# Patient Record
Sex: FEMALE | Race: WHITE | NOT HISPANIC OR LATINO | Employment: UNEMPLOYED | URBAN - METROPOLITAN AREA
[De-identification: names, ages, dates, MRNs, and addresses within clinical notes are randomized per-mention and may not be internally consistent; named-entity substitution may affect disease eponyms.]

---

## 2017-04-28 ENCOUNTER — HOSPITAL ENCOUNTER (INPATIENT)
Facility: HOSPITAL | Age: 72
LOS: 1 days | Discharge: NURSING, REHAB OR LONG-TERM CARE | DRG: 065 | End: 2017-05-02
Attending: HOSPITALIST | Admitting: HOSPITALIST
Payer: MEDICARE

## 2017-04-28 DIAGNOSIS — I63.81 LACUNAR STROKE OF LEFT SUBTHALAMIC REGION: ICD-10-CM

## 2017-04-28 DIAGNOSIS — I63.9 STROKE: ICD-10-CM

## 2017-04-28 DIAGNOSIS — R29.898 LEG WEAKNESS: ICD-10-CM

## 2017-04-28 PROBLEM — I10 ESSENTIAL HYPERTENSION, BENIGN: Status: ACTIVE | Noted: 2017-04-28

## 2017-04-28 PROCEDURE — G0379 DIRECT REFER HOSPITAL OBSERV: HCPCS

## 2017-04-28 PROCEDURE — 25000003 PHARM REV CODE 250: Performed by: HOSPITALIST

## 2017-04-28 PROCEDURE — 63600175 PHARM REV CODE 636 W HCPCS: Performed by: HOSPITALIST

## 2017-04-28 PROCEDURE — G0378 HOSPITAL OBSERVATION PER HR: HCPCS

## 2017-04-28 PROCEDURE — 11000001 HC ACUTE MED/SURG PRIVATE ROOM

## 2017-04-28 RX ORDER — ASPIRIN 81 MG/1
81 TABLET ORAL DAILY
Status: DISCONTINUED | OUTPATIENT
Start: 2017-04-29 | End: 2017-05-02 | Stop reason: HOSPADM

## 2017-04-28 RX ORDER — ENOXAPARIN SODIUM 100 MG/ML
40 INJECTION SUBCUTANEOUS EVERY 24 HOURS
Status: DISCONTINUED | OUTPATIENT
Start: 2017-04-28 | End: 2017-05-02 | Stop reason: HOSPADM

## 2017-04-28 RX ORDER — ACETAMINOPHEN 325 MG/1
650 TABLET ORAL EVERY 6 HOURS PRN
Status: DISCONTINUED | OUTPATIENT
Start: 2017-04-28 | End: 2017-05-02 | Stop reason: HOSPADM

## 2017-04-28 RX ORDER — ATORVASTATIN CALCIUM 40 MG/1
40 TABLET, FILM COATED ORAL DAILY
Status: DISCONTINUED | OUTPATIENT
Start: 2017-04-29 | End: 2017-05-02 | Stop reason: HOSPADM

## 2017-04-28 RX ORDER — ONDANSETRON 8 MG/1
8 TABLET, ORALLY DISINTEGRATING ORAL EVERY 8 HOURS PRN
Status: DISCONTINUED | OUTPATIENT
Start: 2017-04-28 | End: 2017-05-02 | Stop reason: HOSPADM

## 2017-04-28 RX ORDER — SODIUM CHLORIDE 9 MG/ML
INJECTION, SOLUTION INTRAVENOUS CONTINUOUS
Status: DISCONTINUED | OUTPATIENT
Start: 2017-04-28 | End: 2017-04-29

## 2017-04-28 RX ORDER — SODIUM CHLORIDE 0.9 % (FLUSH) 0.9 %
3 SYRINGE (ML) INJECTION EVERY 8 HOURS
Status: DISCONTINUED | OUTPATIENT
Start: 2017-04-28 | End: 2017-05-02 | Stop reason: HOSPADM

## 2017-04-28 RX ADMIN — ENOXAPARIN SODIUM 40 MG: 100 INJECTION SUBCUTANEOUS at 09:04

## 2017-04-28 RX ADMIN — SODIUM CHLORIDE: 0.9 INJECTION, SOLUTION INTRAVENOUS at 09:04

## 2017-04-28 RX ADMIN — SODIUM CHLORIDE, PRESERVATIVE FREE 3 ML: 5 INJECTION INTRAVENOUS at 10:04

## 2017-04-29 PROBLEM — G45.9 TIA (TRANSIENT ISCHEMIC ATTACK): Status: ACTIVE | Noted: 2017-04-29

## 2017-04-29 PROBLEM — I63.81: Status: ACTIVE | Noted: 2017-04-29

## 2017-04-29 LAB
ANION GAP SERPL CALC-SCNC: 10 MMOL/L
BACTERIA #/AREA URNS HPF: NORMAL /HPF
BASOPHILS # BLD AUTO: 0.02 K/UL
BASOPHILS NFR BLD: 0.4 %
BILIRUB UR QL STRIP: NEGATIVE
BUN SERPL-MCNC: 12 MG/DL
CALCIUM SERPL-MCNC: 9.1 MG/DL
CHLORIDE SERPL-SCNC: 109 MMOL/L
CHOLEST/HDLC SERPL: 3.2 {RATIO}
CLARITY UR: CLEAR
CO2 SERPL-SCNC: 21 MMOL/L
COLOR UR: YELLOW
CREAT SERPL-MCNC: 0.8 MG/DL
DIFFERENTIAL METHOD: NORMAL
EOSINOPHIL # BLD AUTO: 0.1 K/UL
EOSINOPHIL NFR BLD: 2.5 %
ERYTHROCYTE [DISTWIDTH] IN BLOOD BY AUTOMATED COUNT: 13.4 %
EST. GFR  (AFRICAN AMERICAN): >60 ML/MIN/1.73 M^2
EST. GFR  (NON AFRICAN AMERICAN): >60 ML/MIN/1.73 M^2
ESTIMATED AVG GLUCOSE: 114 MG/DL
GLUCOSE SERPL-MCNC: 79 MG/DL
GLUCOSE UR QL STRIP: NEGATIVE
HBA1C MFR BLD HPLC: 5.6 %
HCT VFR BLD AUTO: 38.4 %
HDL/CHOLESTEROL RATIO: 30.9 %
HDLC SERPL-MCNC: 282 MG/DL
HDLC SERPL-MCNC: 87 MG/DL
HGB BLD-MCNC: 12.9 G/DL
HGB UR QL STRIP: ABNORMAL
KETONES UR QL STRIP: NEGATIVE
LDLC SERPL CALC-MCNC: 173.2 MG/DL
LEUKOCYTE ESTERASE UR QL STRIP: ABNORMAL
LYMPHOCYTES # BLD AUTO: 2.3 K/UL
LYMPHOCYTES NFR BLD: 44.7 %
MCH RBC QN AUTO: 30.9 PG
MCHC RBC AUTO-ENTMCNC: 33.6 %
MCV RBC AUTO: 92 FL
MICROSCOPIC COMMENT: NORMAL
MONOCYTES # BLD AUTO: 0.4 K/UL
MONOCYTES NFR BLD: 6.9 %
NEUTROPHILS # BLD AUTO: 2.4 K/UL
NEUTROPHILS NFR BLD: 45.3 %
NITRITE UR QL STRIP: NEGATIVE
NONHDLC SERPL-MCNC: 195 MG/DL
PH UR STRIP: 6 [PH] (ref 5–8)
PLATELET # BLD AUTO: 189 K/UL
PMV BLD AUTO: 10.6 FL
POTASSIUM SERPL-SCNC: 3.6 MMOL/L
PROT UR QL STRIP: NEGATIVE
RBC # BLD AUTO: 4.17 M/UL
RBC #/AREA URNS HPF: 1 /HPF (ref 0–4)
SODIUM SERPL-SCNC: 140 MMOL/L
SP GR UR STRIP: 1.01 (ref 1–1.03)
SQUAMOUS #/AREA URNS HPF: 2 /HPF
TRIGL SERPL-MCNC: 109 MG/DL
TSH SERPL DL<=0.005 MIU/L-ACNC: 1.52 UIU/ML
URN SPEC COLLECT METH UR: ABNORMAL
UROBILINOGEN UR STRIP-ACNC: NEGATIVE EU/DL
WBC # BLD AUTO: 5.21 K/UL
WBC #/AREA URNS HPF: 3 /HPF (ref 0–5)

## 2017-04-29 PROCEDURE — 93306 TTE W/DOPPLER COMPLETE: CPT | Mod: 26,,, | Performed by: INTERNAL MEDICINE

## 2017-04-29 PROCEDURE — 85025 COMPLETE CBC W/AUTO DIFF WBC: CPT

## 2017-04-29 PROCEDURE — 94761 N-INVAS EAR/PLS OXIMETRY MLT: CPT

## 2017-04-29 PROCEDURE — 97802 MEDICAL NUTRITION INDIV IN: CPT

## 2017-04-29 PROCEDURE — G8996 SWALLOW CURRENT STATUS: HCPCS | Mod: CJ

## 2017-04-29 PROCEDURE — G8997 SWALLOW GOAL STATUS: HCPCS | Mod: CH

## 2017-04-29 PROCEDURE — 80061 LIPID PANEL: CPT

## 2017-04-29 PROCEDURE — 80048 BASIC METABOLIC PNL TOTAL CA: CPT

## 2017-04-29 PROCEDURE — 93306 TTE W/DOPPLER COMPLETE: CPT

## 2017-04-29 PROCEDURE — G8988 SELF CARE GOAL STATUS: HCPCS | Mod: CJ

## 2017-04-29 PROCEDURE — 84443 ASSAY THYROID STIM HORMONE: CPT

## 2017-04-29 PROCEDURE — 97166 OT EVAL MOD COMPLEX 45 MIN: CPT

## 2017-04-29 PROCEDURE — 97530 THERAPEUTIC ACTIVITIES: CPT

## 2017-04-29 PROCEDURE — G8979 MOBILITY GOAL STATUS: HCPCS | Mod: CI

## 2017-04-29 PROCEDURE — 25500020 PHARM REV CODE 255: Performed by: HOSPITALIST

## 2017-04-29 PROCEDURE — G8987 SELF CARE CURRENT STATUS: HCPCS | Mod: CK

## 2017-04-29 PROCEDURE — 83036 HEMOGLOBIN GLYCOSYLATED A1C: CPT

## 2017-04-29 PROCEDURE — G8978 MOBILITY CURRENT STATUS: HCPCS | Mod: CL

## 2017-04-29 PROCEDURE — 97162 PT EVAL MOD COMPLEX 30 MIN: CPT

## 2017-04-29 PROCEDURE — 36415 COLL VENOUS BLD VENIPUNCTURE: CPT

## 2017-04-29 PROCEDURE — 81000 URINALYSIS NONAUTO W/SCOPE: CPT

## 2017-04-29 PROCEDURE — 63600175 PHARM REV CODE 636 W HCPCS: Performed by: HOSPITALIST

## 2017-04-29 PROCEDURE — 97116 GAIT TRAINING THERAPY: CPT

## 2017-04-29 PROCEDURE — 11000001 HC ACUTE MED/SURG PRIVATE ROOM

## 2017-04-29 PROCEDURE — A9585 GADOBUTROL INJECTION: HCPCS | Performed by: HOSPITALIST

## 2017-04-29 PROCEDURE — G0378 HOSPITAL OBSERVATION PER HR: HCPCS

## 2017-04-29 PROCEDURE — 92610 EVALUATE SWALLOWING FUNCTION: CPT

## 2017-04-29 PROCEDURE — 25000003 PHARM REV CODE 250: Performed by: HOSPITALIST

## 2017-04-29 RX ORDER — AMLODIPINE BESYLATE 5 MG/1
5 TABLET ORAL DAILY
Status: DISCONTINUED | OUTPATIENT
Start: 2017-04-30 | End: 2017-04-29

## 2017-04-29 RX ORDER — AMLODIPINE BESYLATE 5 MG/1
5 TABLET ORAL DAILY
Status: DISCONTINUED | OUTPATIENT
Start: 2017-04-29 | End: 2017-05-02 | Stop reason: HOSPADM

## 2017-04-29 RX ORDER — GADOBUTROL 604.72 MG/ML
10 INJECTION INTRAVENOUS
Status: COMPLETED | OUTPATIENT
Start: 2017-04-29 | End: 2017-04-29

## 2017-04-29 RX ADMIN — SODIUM CHLORIDE, PRESERVATIVE FREE 3 ML: 5 INJECTION INTRAVENOUS at 01:04

## 2017-04-29 RX ADMIN — GADOBUTROL 10 ML: 604.72 INJECTION INTRAVENOUS at 12:04

## 2017-04-29 RX ADMIN — SODIUM CHLORIDE, PRESERVATIVE FREE 3 ML: 5 INJECTION INTRAVENOUS at 06:04

## 2017-04-29 RX ADMIN — ENOXAPARIN SODIUM 40 MG: 100 INJECTION SUBCUTANEOUS at 04:04

## 2017-04-29 RX ADMIN — SODIUM CHLORIDE, PRESERVATIVE FREE 3 ML: 5 INJECTION INTRAVENOUS at 10:04

## 2017-04-29 RX ADMIN — AMLODIPINE BESYLATE 5 MG: 5 TABLET ORAL at 04:04

## 2017-04-29 NOTE — PLAN OF CARE
Problem: Patient Care Overview  Goal: Plan of Care Review  Outcome: Ongoing (interventions implemented as appropriate)  Pt. In bed , call light within reach, fall reduction plan in place,TELE with no true red alarms during night.Upon initial assessment/frequent checks, pt. Had no complaints of pain/distress during night.NEURO/NIHS scale reveal no new deficits , R Leg still with drift /w patient still complaint of R leg heaviness.Will continue to monitor and report to oncoming nurse.

## 2017-04-29 NOTE — PLAN OF CARE
Problem: Occupational Therapy Goal  Goal: Occupational Therapy Goal  Goals to be met by: 5/29/2017     Patient will increase functional independence with ADLs by performing:    UE Dressing with Modified [place].  LE Dressing with Modified [place].  Grooming while standing at sink with Modified [place].  Toileting from bedside commode with Modified [place] for hygiene and clothing management.   Bathing from shower chair/bench with Supervision.  Stand pivot transfers with Modified [place].  Toilet transfer to bedside commode with Modified [place].    Pt. Has SP cane not in use. Needs TTB & BSC. To benefit from OT in In-patient rehab.   Outcome: Ongoing (interventions implemented as appropriate)  Initial OT eval complete.  Pt. Performed supine <-> sit with assist for RLE.  Ambulated with RW & R-knee block for household distance.  Stand-pivot toilet T/F with RW, assist for lift/lower, & min v/c for safe hand placement.  Required assist for initial clothing management & CGA for clothing management after toileting 2* impaired balance.  To benefit from continued OT services to increase independence in self-care.  [place] for supine <-> sit.  ModA for toilet T/F.  OT to follow.

## 2017-04-29 NOTE — PLAN OF CARE
Problem: Physical Therapy Goal  Goal: Physical Therapy Goal  Goals to be met by: 17     Patient will increase functional independence with mobility by performin. Supine to sit with Stand-by Assistance  2. Sit to supine with Stand-by Assistance  3. Sit to stand transfer with Contact Guard Assistance  4. Bed to chair transfer with Contact Guard Assistance using Rolling Walker  5. Gait x 50 feet with Contact Guard Assistance using Rolling Walker.   6. Ascend/descend 3 stair with bilateral Handrails Minimal Assistance   7. Lower extremity exercise program x20 reps with assistance as needed  Rec: In pt rehab DME: RW, W/C, BSC TTB  Outcome: Ongoing (interventions implemented as appropriate)  PT evaluation was completed. Pt requires EZRA x 1 for supine<>sit, sit<>stand and commode transfer using a rw with MODA x 1, gait up to 10 ft requiring MODA x 1 needing assist/faciliatation for correct R LE advancement and knee ext during stance phase of gait.

## 2017-04-29 NOTE — PLAN OF CARE
04/29/17 1314   Discharge Assessment   Assessment Type Discharge Planning Assessment   Confirmed/corrected address and phone number on facesheet? Yes   Assessment information obtained from? Patient   Prior to hospitilization cognitive status: Alert/Oriented   Prior to hospitalization functional status: Independent   Current cognitive status: Alert/Oriented   Current Functional Status: Independent   Arrived From [facility] hospital  ([facility])   Lives With spouse   Able to Return to Prior Arrangements yes   Is patient able to care for self after discharge? Yes   How many people do you have in your home that can help with your care after discharge? 1   Who are your caregiver(s) and their phone number(s)? Mitch Kincaid Jr. ()[phone]   Patient's perception of discharge disposition home or selfcare   Readmission Within The Last 30 Days no previous admission in last 30 days   Patient currently being followed by outpatient case management? No   Patient currently receives home health services? No   Does the patient currently use HME? No   Patient currently receives private duty nursing? No   Patient currently receives any other outside agency services? No   Equipment Currently Used at Home none   Do you have any problems affording any of your prescribed medications? No   Is the patient taking medications as prescribed? yes   Do you have any financial concerns preventing you from receiving the healthcare you need? No   Does the patient have transportation to healthcare appointments? Yes   Transportation Available car;family or friend will provide   On Dialysis? No   Does the patient receive services at the [facility]? No   Are there any open cases? No   Discharge Plan A Home with family   Discharge Plan B Home Health   Patient/Family In Agreement With Plan yes

## 2017-04-29 NOTE — NURSING
Pt has no tingling or numbness in right leg but when ambulated to restroom her leg does not have any strength or ability to move without assistance.

## 2017-04-29 NOTE — SUBJECTIVE & OBJECTIVE
Past Medical History:   Diagnosis Date    Arthritis     Hypertension        Past Surgical History:   Procedure Laterality Date    APPENDECTOMY      BREAST SURGERY      Bilateral mastectomy    HYSTERECTOMY         Review of patient's allergies indicates:  No Known Allergies    Current Facility-Administered Medications on File Prior to Encounter   Medication    [COMPLETED] aspirin tablet 325 mg     Current Outpatient Prescriptions on File Prior to Encounter   Medication Sig    amlodipine (NORVASC) 2.5 MG tablet Take 5 mg by mouth once daily.     ibuprofen (ADVIL,MOTRIN) 200 MG tablet Take 200 mg by mouth every 6 (six) hours as needed for Pain.     Family History     Problem Relation (Age of Onset)    Diabetes Maternal Grandmother    Heart disease Father    Hypertension Mother        Social History Main Topics    Smoking status: Current Every Day Smoker     Packs/day: 0.50     Years: 15.00    Smokeless tobacco: Not on file    Alcohol use No    Drug use: No    Sexual activity: Not on file     Review of Systems   Constitutional: Negative for chills, diaphoresis and fever.   HENT: Negative for sore throat and trouble swallowing.    Eyes: Negative for photophobia and visual disturbance.   Respiratory: Negative for cough, shortness of breath and wheezing.    Cardiovascular: Negative for chest pain and palpitations.   Gastrointestinal: Negative for abdominal pain, constipation, diarrhea, nausea and vomiting.   Endocrine: Negative for polydipsia and polyphagia.   Genitourinary: Negative for decreased urine volume, dysuria, hematuria and urgency.   Musculoskeletal: Negative for joint swelling, neck pain and neck stiffness.   Neurological: Positive for tremors (right side) and numbness (right side). Negative for weakness and headaches.   Psychiatric/Behavioral: Negative for agitation, dysphoric mood and suicidal ideas.     Objective:     Vital Signs (Most Recent):  Temp: 96.6 °F (35.9 °C) (04/29/17 0840)  Pulse:  (!) 51 (04/29/17 0840)  Resp: 18 (04/29/17 0840)  BP: (!) 144/71 (04/29/17 0840)  SpO2: 97 % (04/29/17 0922) Vital Signs (24h Range):  Temp:  [96.6 °F (35.9 °C)-98.3 °F (36.8 °C)] 96.6 °F (35.9 °C)  Pulse:  [51-84] 51  Resp:  [17-20] 18  SpO2:  [97 %-99 %] 97 %  BP: (118-173)/(71-99) 144/71     Weight: 52.9 kg (116 lb 10 oz)  Body mass index is 21.33 kg/(m^2).    Physical Exam   Constitutional: She appears well-developed and well-nourished. No distress.   HENT:   Head: Normocephalic and atraumatic.   Mouth/Throat: Oropharynx is clear and moist. No oropharyngeal exudate.   Eyes: Conjunctivae are normal. Pupils are equal, round, and reactive to light. No scleral icterus.   Neck: Neck supple.   Cardiovascular: Normal rate, regular rhythm, normal heart sounds and intact distal pulses.  Exam reveals no gallop and no friction rub.    No murmur heard.  Pulmonary/Chest: Effort normal and breath sounds normal. No respiratory distress. She has no wheezes. She has no rales.   Abdominal: Soft. Bowel sounds are normal. She exhibits no distension. There is no tenderness. There is no rebound and no guarding.   Musculoskeletal: She exhibits no edema, tenderness or deformity.   Neurological: She is alert. She is disoriented ([place] to person and place. Not Time). She exhibits normal muscle tone. GCS eye subscore is 3. GCS verbal subscore is 5. GCS motor subscore is 6.   Mild Left Facial Droop  Mild Right Arm and Right Leg Weakness   Skin: Skin is warm and dry. No rash noted. She is not diaphoretic.   Psychiatric: She has a normal mood and affect. Her behavior is normal.   Oriented to person and place.Could not remember year or why she came to the hospital.    Nursing note and vitals reviewed.       Significant Labs:   CBC:   Recent Labs  Lab 04/28/17  1721 04/29/17  0521   WBC 7.77 5.21   HGB 13.9 12.9   HCT 42.1 38.4    189     CMP:   Recent Labs  Lab 04/28/17  1721 04/29/17  0521    140   K 3.6 3.6    109   CO2  24 21*   GLU 95 79   BUN 14 12   CREATININE 0.73 0.8   CALCIUM 9.9 9.1   PROT 8.1  --    ALBUMIN 4.7  --    BILITOT 0.7  --    ALKPHOS 84  --    AST 27  --    ALT 26  --    ANIONGAP 10 10   EGFRNONAA >60.0 >60     Coagulation:   Recent Labs  Lab 04/28/17  1721   INR 1.1   APTT 26.7     Lipid Panel:   Recent Labs  Lab 04/29/17  0521   CHOL 282*   HDL 87*   LDLCALC 173.2*   TRIG 109   CHOLHDL 30.9     Troponin:   Recent Labs  Lab 04/28/17  1721   TROPONINI <0.012     TSH:   Recent Labs  Lab 04/29/17  0521   TSH 1.521     Urine Studies:   Recent Labs  Lab 04/29/17  0818   COLORU Yellow   APPEARANCEUA Clear   PHUR 6.0   SPECGRAV 1.010   PROTEINUA Negative   GLUCUA Negative   KETONESU Negative   BILIRUBINUA Negative   OCCULTUA 1+*   NITRITE Negative   UROBILINOGEN Negative   LEUKOCYTESUR 1+*   RBCUA 1   WBCUA 3   BACTERIA Rare   SQUAMEPITHEL 2       Significant Imaging:   Imaging Results     None

## 2017-04-29 NOTE — PLAN OF CARE
Problem: SLP Goal  Goal: SLP Goal  Short Term Goals:  1. Pt will consume % of mechanical soft diet with no s/s of airway penetration and/or aspiration given min assist.  2. Pt will implement safe swallowing strategies 100% of the time during meals given no assist.   3. Pt will answer safety questions with 100% accuracy given min assist.   Outcome: Ongoing (interventions implemented as appropriate)  Completed bedside swallow evaluation with SLP. S/s of airway penetration and/or aspiration characterized by coughing noted with straw sips of water. Eliminated with small cup sips. Slow mastication of solids. Recommend a mechanical soft diet with thin liquids and no straws. Assist with meal set up. Crush pills in puree.   Evan Head MS, CCC-SLP

## 2017-04-29 NOTE — PLAN OF CARE
Problem: Patient Care Overview  Goal: Plan of Care Review  Outcome: Ongoing (interventions implemented as appropriate)  Pt has reported RLE weakness/heaviness and drift. Pt is able to turn herself in bed and ambulate with assistance to the restroom. Bed alarm set. SCD's and EVAN's applied. No reported pain and No noted distress.

## 2017-04-29 NOTE — PT/OT/SLP EVAL
Physical Therapy  Evaluation and  Treatment    Antonina Butler   MRN: [id number]   Admitting Diagnosis: Lacunar stroke of left subthalamic region    PT Received On: 17  PT Start Time: 1106 (also seen 13:57 due to MRI interrupting earlier visit)     PT Stop Time: 1121 (2nd visit ended 14:25)    PT Total Time (min): 43 min       Billable Minutes:  Evaluation 15  Gait training 10 minutes    Diagnosis: Lacunar stroke of left subthalamic region      Past Medical History:   Diagnosis Date    Arthritis     Hypertension       Past Surgical History:   Procedure Laterality Date    APPENDECTOMY      BREAST SURGERY      Bilateral mastectomy    HYSTERECTOMY         Referring physician: Dr. Cook  Date referred to PT: 17    General Precautions: Standard, aspiration, fall  Orthopedic Precautions: N/A   Braces: N/A       Do you have any cultural, spiritual, Yazdanism conflicts, given your current situation?: None    Patient History:  Lives With: spouse  Living Arrangements: house  Home Accessibility: stairs to enter home  Number of Stairs to Enter Home: 3 (one step  to deck with railing and 3 steps from deck into house without  railing.)  Stair Railings at Home: none (railing present on R for one step to deck only.)  Living Environment Comment: Pt lives with spouse in ss house with one step and railing on R to deck and 3 steps without railing from deck to house. Pt was (I) with ADLS including cooking, cleaning, driving and gait at community level without use of DME. Pt has a SPC but wasn't using device.  Equipment Currently Used at Home: none  DME owned (not currently used): single point cane    Previous Level of Function:  Ambulation Skills: independent  Transfer Skills: independent  ADL Skills: independent  Work/Leisure Activity: independent    Subjective:  Communicated with RN prior to session.    Chief Complaint: R leg weakness  Patient goals: to return to PLOF    Pain Ratin/10               Pain Rating  Post-Intervention: 0/10    Objective:   Patient found with: peripheral IV, SCD, telemetry, blood pressure cuff     Cognitive Exam:  Oriented to: Person, place, time and situation    Follows Commands/attention: Follows multi-step commands but with slowed motor and cognitive responses  Communication: clear/fluent  Safety awareness/insight to disability: impaired    Physical Exam:  Postural examination/scapula alignment: No postural abnormalities identified    Skin integrity: Visible skin intact  Edema: None noted     Sensation:   Intact    Lower Extremity Range of Motion:  Right Lower Extremity: limited hip/knee/ankle AROM  Left Lower Extremity: WFL    Lower Extremity Strength:  Right Lower Extremity: 2/5 to 2+/5  Left Lower Extremity: 4/5     Fine motor coordination:  Impaired R heel to shin test    Gross motor coordination: impaired R LE    Functional Mobility:  Bed Mobility:  Scooting/Bridging: Minimum Assistance (MIN A for R LE)  Supine to Sit: Minimum Assistance (assist for R LE)  Sit to Supine: Minimum Assistance (assist for R LE)    Transfers:  Sit <> Stand Assistance: Moderate Assistance  Sit <> Stand Assistive Device: Rolling Walker  Toilet Transfer Technique: Stand Pivot  Toilet Transfer Assistance: Moderate Assistance  Toilet Transfer Assistive Device: Rolling Walker    Gait:   Gait Distance: 10 ft  Assistance 1: Moderate assistance  Gait Assistive Device: Rolling walker  Gait Pattern: 3-point gait  Gait Deviation(s): decreased duran, decreased step length, decreased stride length, decreased swing-to-stance ratio, decreased toe-to-floor clearance, decreased weight-shifting ability, excessive knee flexion, knee hyperextension (assist needed for advancement of R LE and knee ext during  stance phase of gait.)    Stairs:  Not tested    Balance:   Static Sit: GOOD: Takes MODERATE challenges from all directions  Dynamic Sit: GOOD: Maintains balance through MODERATE excursions of active trunk movement  Static  Stand: POOR+: Needs MINIMAL assist to maintain  Dynamic stand: POOR: N/A    Therapeutic Activities and Exercises:  Supine<>sit with EZRA x 1 for R LE, sit<>stand and commode transfer using a rw with MODA x 1  Ambulated 10 ft x 2 using a rw with MODA x 1 and assist/facilitation for correct R LE advancement and knee ext during stance phase of gait    AM-PAC 6 CLICK MOBILITY  How much help from another person does this patient currently need?   1 = Unable, Total/Dependent Assistance  2 = A lot, Maximum/Moderate Assistance  3 = A little, Minimum/Contact Guard/Supervision  4 = None, Modified [place]/Independent    Turning over in bed (including adjusting bedclothes, sheets and blankets)?: 3  Sitting down on and standing up from a chair with arms (e.g., wheelchair, bedside commode, etc.): 2  Moving from lying on back to sitting on the side of the bed?: 3  Moving to and from a bed to a chair (including a wheelchair)?: 2  Need to walk in hospital room?: 2  Climbing 3-5 steps with a railing?: 2  Total Score: 14     AM-PAC Raw Score CMS G-Code Modifier Level of Impairment Assistance   6 % Total / Unable   7 - 9 CM 80 - 100% Maximal Assist   10 - 14 CL 60 - 80% Moderate Assist   15 - 19 CK 40 - 60% Moderate Assist   20 - 22 CJ 20 - 40% Minimal Assist   23 CI 1-20% SBA / CGA   24 CH 0% Independent/ Mod I     Patient left supine with call button in reach, RN notified and spouse present.    Assessment:   Antonina Butler is a 71 y.o. female with a medical diagnosis of Lacunar stroke of left subthalamic region and presents with severely decreased R LE/motor control, decreased stand balance, slowed motor/cognitive responses and impaired mobility skills.    Rehab identified problem list/impairments: Rehab identified problem list/impairments: weakness, impaired self care skills, impaired functional mobilty, gait instability, impaired balance, decreased coordination, decreased lower extremity function, decreased  ROM    Rehab potential is good.    Activity tolerance: Good    Discharge recommendations: Discharge Facility/Level Of Care Needs: rehabilitation facility     Barriers to discharge: Barriers to Discharge: Decreased caregiver support (pt reports spouse works during the day.)    Equipment recommendations: Equipment Needed After Discharge: bath bench, walker, rolling, 3-in-1 commode, wheelchair     GOALS:   Physical Therapy Goals        Problem: Physical Therapy Goal    Goal Priority Disciplines Outcome Goal Variances Interventions   Physical Therapy Goal     PT/OT, PT Ongoing (interventions implemented as appropriate)     Description:  Goals to be met by: 17     Patient will increase functional independence with mobility by performin. Supine to sit with Stand-by Assistance  2. Sit to supine with Stand-by Assistance  3. Sit to stand transfer with Contact Guard Assistance  4. Bed to chair transfer with Contact Guard Assistance using Rolling Walker  5. Gait  x 50 feet with Contact Guard Assistance using Rolling Walker.   6. Ascend/descend 3 stair with bilateral Handrails Minimal Assistance     7. Lower extremity exercise program x20 reps  with assistance as needed  Rec:  In pt rehab    DME: RW, W/C, BSC TTB                PLAN:    Patient to be seen 6 x/week to address the above listed problems via gait training, therapeutic activities, therapeutic exercises, neuromuscular re-education  Plan of Care expires: 17  Plan of Care reviewed with: patient    Functional Assessment Tool Used: Am Pac  Score: 14  Functional Limitation: Mobility: Walking and moving around  Mobility: Walking and Moving Around Current Status (): CL  Mobility: Walking and Moving Around Goal Status (): MARIBEL Kelley, PT  2017

## 2017-04-29 NOTE — H&P
"[facility] Hospital Medicine  History & Physical    Patient Name: Antonina Butler  MRN: [id number]  Admission Date: 4/28/2017  Attending Physician: Eleazar Cook MD   Primary Care Provider: Goran Stern MD         Patient information was obtained from patient, past medical records and ER records.     Subjective:     Principal Problem:Leg weakness    Chief Complaint: No chief complaint on file.       HPI: Antonina Butler is a 71-year-old female with a history of hypertension, and bilateral mastectomy due to fibroids who presented to [facility] ED with complaints of weakness to the right leg and less so to the right arm.  This started yesterday.  She states she previously had had some episodes where she felt weak on the right, but never this severe. Denies any pain associated with this. No headache, neck pain, or back pain. No numbness. She states she also has had some difficulty with coordination with the right hand and drops things. ED workup showed normal CBC,CMP,  Elevated Cholestoral, Negative Troponin, Normal TSH, and a normal, Chest X-Ray and Head CT. She was transferred to [facility] to Hospital Medicine Service for TIA with a full stroke workup and neurology consult.    Upon arrival, she was mildly confused.  She could not remember the year and when asked why she came to the hospital, she stated "Because someone was chasing me."  She was oriented to person and place.  I appreciated a slight left facial droop and mild left leg and left arm weakness on exam.          Past Medical History:   Diagnosis Date    Arthritis     Hypertension        Past Surgical History:   Procedure Laterality Date    APPENDECTOMY      BREAST SURGERY      Bilateral mastectomy    HYSTERECTOMY         Review of patient's allergies indicates:  No Known Allergies    Current Facility-Administered Medications on File Prior to Encounter   Medication    [COMPLETED] aspirin tablet 325 " mg     Current Outpatient Prescriptions on File Prior to Encounter   Medication Sig    amlodipine (NORVASC) 2.5 MG tablet Take 5 mg by mouth once daily.     ibuprofen (ADVIL,MOTRIN) 200 MG tablet Take 200 mg by mouth every 6 (six) hours as needed for Pain.     Family History     Problem Relation (Age of Onset)    Diabetes Maternal Grandmother    Heart disease Father    Hypertension Mother        Social History Main Topics    Smoking status: Current Every Day Smoker     Packs/day: 0.50     Years: 15.00    Smokeless tobacco: Not on file    Alcohol use No    Drug use: No    Sexual activity: Not on file     Review of Systems   Constitutional: Negative for chills, diaphoresis and fever.   HENT: Negative for sore throat and trouble swallowing.    Eyes: Negative for photophobia and visual disturbance.   Respiratory: Negative for cough, shortness of breath and wheezing.    Cardiovascular: Negative for chest pain and palpitations.   Gastrointestinal: Negative for abdominal pain, constipation, diarrhea, nausea and vomiting.   Endocrine: Negative for polydipsia and polyphagia.   Genitourinary: Negative for decreased urine volume, dysuria, hematuria and urgency.   Musculoskeletal: Negative for joint swelling, neck pain and neck stiffness.   Neurological: Positive for tremors (right side) and numbness (right side). Negative for weakness and headaches.   Psychiatric/Behavioral: Negative for agitation, dysphoric mood and suicidal ideas.     Objective:     Vital Signs (Most Recent):  Temp: 96.6 °F (35.9 °C) (04/29/17 0840)  Pulse: (!) 51 (04/29/17 0840)  Resp: 18 (04/29/17 0840)  BP: (!) 144/71 (04/29/17 0840)  SpO2: 97 % (04/29/17 0922) Vital Signs (24h Range):  Temp:  [96.6 °F (35.9 °C)-98.3 °F (36.8 °C)] 96.6 °F (35.9 °C)  Pulse:  [51-84] 51  Resp:  [17-20] 18  SpO2:  [97 %-99 %] 97 %  BP: (118-173)/(71-99) 144/71     Weight: 52.9 kg (116 lb 10 oz)  Body mass index is 21.33 kg/(m^2).    Physical Exam   Constitutional: She  appears well-developed and well-nourished. No distress.   HENT:   Head: Normocephalic and atraumatic.   Mouth/Throat: Oropharynx is clear and moist. No oropharyngeal exudate.   Eyes: Conjunctivae are normal. Pupils are equal, round, and reactive to light. No scleral icterus.   Neck: Neck supple.   Cardiovascular: Normal rate, regular rhythm, normal heart sounds and intact distal pulses.  Exam reveals no gallop and no friction rub.    No murmur heard.  Pulmonary/Chest: Effort normal and breath sounds normal. No respiratory distress. She has no wheezes. She has no rales.   Abdominal: Soft. Bowel sounds are normal. She exhibits no distension. There is no tenderness. There is no rebound and no guarding.   Musculoskeletal: She exhibits no edema, tenderness or deformity.   Neurological: She is alert. She is disoriented ([place] to person and place. Not Time). She exhibits normal muscle tone. GCS eye subscore is 3. GCS verbal subscore is 5. GCS motor subscore is 6.   Mild Left Facial Droop  Mild Right Arm and Right Leg Weakness   Skin: Skin is warm and dry. No rash noted. She is not diaphoretic.   Psychiatric: She has a normal mood and affect. Her behavior is normal.   Oriented to person and place.Could not remember year or why she came to the hospital.    Nursing note and vitals reviewed.       Significant Labs:   CBC:   Recent Labs  Lab 04/28/17  1721 04/29/17  0521   WBC 7.77 5.21   HGB 13.9 12.9   HCT 42.1 38.4    189     CMP:   Recent Labs  Lab 04/28/17  1721 04/29/17  0521    140   K 3.6 3.6    109   CO2 24 21*   GLU 95 79   BUN 14 12   CREATININE 0.73 0.8   CALCIUM 9.9 9.1   PROT 8.1  --    ALBUMIN 4.7  --    BILITOT 0.7  --    ALKPHOS 84  --    AST 27  --    ALT 26  --    ANIONGAP 10 10   EGFRNONAA >60.0 >60     Coagulation:   Recent Labs  Lab 04/28/17  1721   INR 1.1   APTT 26.7     Lipid Panel:   Recent Labs  Lab 04/29/17  0521   CHOL 282*   HDL 87*   LDLCALC 173.2*   TRIG 109   CHOLHDL 30.9      Troponin:   Recent Labs  Lab 04/28/17  1721   TROPONINI <0.012     TSH:   Recent Labs  Lab 04/29/17  0521   TSH 1.521     Urine Studies:   Recent Labs  Lab 04/29/17  0818   COLORU Yellow   APPEARANCEUA Clear   PHUR 6.0   SPECGRAV 1.010   PROTEINUA Negative   GLUCUA Negative   KETONESU Negative   BILIRUBINUA Negative   OCCULTUA 1+*   NITRITE Negative   UROBILINOGEN Negative   LEUKOCYTESUR 1+*   RBCUA 1   WBCUA 3   BACTERIA Rare   SQUAMEPITHEL 2       Significant Imaging:   Imaging Results     None        Assessment/Plan:     Essential hypertension, benign  On Amlodipine 2.5mg.Holding now for permissive hypertension      TIA (transient ischemic attack)  Right Sided Weakness  Patient admitted with right sided weakness. Also displays mild left facial droop. Now with evidence of acute confusion. Head CT negative  --Aspirin 325 mg in ED  --Continue ASA 81 mg and atorvastatin 40 mg daily  --Neuro Consult  --Neuro Checks every 4 hours  --ST/PT/OT  --MRI/MRA Head and Neck           VTE Risk Mitigation         Ordered     Medium Risk of VTE  Once      04/28/17 2129     Reason for No Pharmacological VTE Prophylaxis  Once      04/28/17 2129     Place sequential compression device  Until discontinued      04/28/17 2129     enoxaparin injection 40 mg  Daily     Route:  Subcutaneous        04/28/17 2129        Eboni Ragsdale NP  Department of Hospital Medicine   [facility]

## 2017-04-29 NOTE — ASSESSMENT & PLAN NOTE
Right Sided Weakness  Patient admitted with right sided weakness. Also displays mild left facial droop. Now with evidence of acute confusion. Head CT negative  --Aspirin 325 mg in ED  --Continue ASA 81 mg and atorvastatin 40 mg daily  --Neuro Consult  --Neuro Checks every 4 hours  --ST/PT/OT  --MRI/MRA Head and Neck

## 2017-04-30 PROBLEM — G45.9 TIA (TRANSIENT ISCHEMIC ATTACK): Status: RESOLVED | Noted: 2017-04-29 | Resolved: 2017-04-30

## 2017-04-30 PROCEDURE — 11000001 HC ACUTE MED/SURG PRIVATE ROOM

## 2017-04-30 PROCEDURE — 94761 N-INVAS EAR/PLS OXIMETRY MLT: CPT

## 2017-04-30 PROCEDURE — 25000003 PHARM REV CODE 250: Performed by: HOSPITALIST

## 2017-04-30 PROCEDURE — 63600175 PHARM REV CODE 636 W HCPCS: Performed by: HOSPITALIST

## 2017-04-30 PROCEDURE — G0378 HOSPITAL OBSERVATION PER HR: HCPCS

## 2017-04-30 RX ADMIN — AMLODIPINE BESYLATE 5 MG: 5 TABLET ORAL at 08:04

## 2017-04-30 RX ADMIN — ASPIRIN 81 MG: 81 TABLET, COATED ORAL at 08:04

## 2017-04-30 RX ADMIN — SODIUM CHLORIDE, PRESERVATIVE FREE 3 ML: 5 INJECTION INTRAVENOUS at 06:04

## 2017-04-30 RX ADMIN — SODIUM CHLORIDE, PRESERVATIVE FREE 3 ML: 5 INJECTION INTRAVENOUS at 05:04

## 2017-04-30 RX ADMIN — ENOXAPARIN SODIUM 40 MG: 100 INJECTION SUBCUTANEOUS at 05:04

## 2017-04-30 RX ADMIN — SODIUM CHLORIDE, PRESERVATIVE FREE 3 ML: 5 INJECTION INTRAVENOUS at 09:04

## 2017-04-30 RX ADMIN — ATORVASTATIN CALCIUM 40 MG: 40 TABLET, FILM COATED ORAL at 08:04

## 2017-04-30 NOTE — PLAN OF CARE
Problem: Patient Care Overview  Goal: Plan of Care Review  Outcome: Ongoing (interventions implemented as appropriate)  Pt. In bed , call light within reach, fall reduction plan in place,TELE with no true red alarms during night.Upon initial assessment/frequent checks, pt. Had no complaints of pain/distress during night.Will continue to monitor and report to oncoming nurse.

## 2017-04-30 NOTE — PLAN OF CARE
Problem: Patient Care Overview  Goal: Plan of Care Review  Pt is awaiting consult to physical medicine which should be consulted tomorrow. LRE still has weakness and drift. No reported pain or noted distress. Will continue to monitor.

## 2017-04-30 NOTE — ASSESSMENT & PLAN NOTE
Right Sided Weakness Leg greater then Arm  Continue ASA 81 mg and atorvastatin 40 mg daily. Appreciate Neuro Consult. Continue ST/PT/OT. Recommend inpatient rehab placement. Echo read is pending. LDL was 173.

## 2017-04-30 NOTE — CONSULTS
[facility]  Adult Nutrition  Consult Note    SUMMARY     Recommendations    Recommendation/Intervention:   1. Continue Dental Soft diet per ST recs.   2. Encourage good intake at meals    Goals:   Pt will consume at least 50% intake at meals  Nutrition Goal Status: new     Continuum of Care Plan  Referral to Outpatient Services:  (d/c diet per ST recs)    Reason for Assessment  Reason for Assessment: physician consult (assess dietary needs)  Diagnosis: stroke/CVA  Relevent Medical History: HTN, arthritis, appendectomy      General Information Comments: Pt started on Dental Soft diet per ST recs. Noted pt with fair po intake. Nonutrtion dx at this time    Nutrition Prescription Ordered  Current Diet Order: Dental Soft     Nutrition Risk Screen  Nutrition Risk Screen: no indicators present    Nutrition/Diet History  Food Preferences: no Taoism or cultural food prefs identfied    Labs/Tests/Procedures/Meds  Pertinent Labs Reviewed: reviewed  Pertinent Medications Reviewed: reviewed  Pertinent Medications Comments: aspirin    Physical Findings  Overall Physical Appearance:  (WNL)  Tubes:  (none)  Oral/Mouth Cavity:  (unable to assess)  Skin:  (Bhanu 20-intact)    Anthropometrics  Height (inches): 61.97 in  Weight Method: Bed Scale  Weight (kg): 52.9 kg  Ideal Body Weight (IBW), Female: 109.85 lb  % Ideal Body Weight, Female (lb): 106.16 lb  BMI (kg/m2): 21.35  BMI Grade: 18.5-24.9 - normal     Estimated/Assessed Needs  Weight Used For Calorie Calculations: 52.9 kg (116 lb 10 oz)   Height (cm): 157.4 cm  Energy Need Method: Kcal/kg (6520-9208 (30-35 kcal/kg))  RMR (Semmes-St. Jeor Equation): 992.06  Weight Used For Protein Calculations: 52.9 kg (116 lb 10 oz)  Protein Requirements: 53-63g (1.0-1.2 g/kg)    Assessment and Plan  No new Assessment & Plan notes have been filed under this hospital service since the last note was generated.  Service: Nutrition    Monitor and Evaluation  Food and  Nutrient Intake: food and beverage intake  Food and Nutrient Adminstration: diet order  Physical Activity and Function: nutrition-related ADLs and IADLs  Anthropometric Measurements: weight  Biochemical Data, Medical Tests and Procedures: electrolyte and renal panel  Nutrition-Focused Physical Findings: overall appearance    Nutrition Risk  Level of Risk:  (1x/weekly)    Nutrition Follow-Up  RD Follow-up?: Yes

## 2017-04-30 NOTE — PLAN OF CARE
Problem: Nutrition, Imbalanced: Inadequate Oral Intake (Adult)  Goal: Improved Oral Intake  Patient will demonstrate the desired outcomes by discharge/transition of care.  Outcome: Ongoing (interventions implemented as appropriate)  Recommendation/Intervention:   1. Continue Dental Soft diet per ST recs.   2. Encourage good intake at meals     Goals:   Pt will consume at least 50% intake at meals  Nutrition Goal Status: new

## 2017-04-30 NOTE — PT/OT/SLP EVAL
Occupational Therapy  Evaluation/Treatment    Antonina Butler   MRN: [id number]   Admitting Diagnosis: Lacunar stroke of left subthalamic region    OT Date of Treatment: 04/29/17   OT Start Time: 1106 (1357)  OT Stop Time: 1121 (1425)  OT Total Time (min): 15 min    Billable Minutes:  Evaluation 15  Self Care/Home Management 28  Total Time 43    Diagnosis: Lacunar stroke of left subthalamic region       Past Medical History:   Diagnosis Date    Arthritis     Hypertension       Past Surgical History:   Procedure Laterality Date    APPENDECTOMY      BREAST SURGERY      Bilateral mastectomy    HYSTERECTOMY             General Precautions: Standard, aspiration, fall  Orthopedic Precautions: N/A  Braces: N/A    Do you have any cultural, spiritual, Cheondoism conflicts, given your current situation?: None     Patient History:  Living Environment  Lives With: spouse, other (see comments) (Adult grand children also live in house.)  Living Arrangements: house  Home Accessibility: stairs to enter home  Number of Stairs to Enter Home: 3  Stair Railings at Home:  (R-handrail present on deck)  Transportation Available: car, family or friend will provide  Living Environment Comment: Lives with spouse & adult grand children in SS home with 1step & R-handrail for deck & 3STE without hand rail.  PLOF independent with ADL, ambulation, cooking, cleaning, & driving.  Has SP cane not in use.    Equipment Currently Used at Home: none    Prior level of function:   Bed Mobility/Transfers: independent  Grooming: independent  Bathing: independent  Upper Body Dressing: independent  Lower Body Dressing: independent  Toileting: independent  Home Management Skills: other (see comments) (Shared home management responsibilities with spouse.)  Homemaking Responsibilities: Yes  Meal Prep Responsibility: Primary  Laundry Responsibility: Primary  Cleaning Responsibility: Primary  Bill Paying/Finance Responsibility: Secondary  Shopping  "Responsibility: Primary   Responsibility: Primary (Has adult grandchildren in house.)  Homemaking Comments: Shares homemaking responsibilities with spouse.    Driving License: Yes  Mode of Transportation: Car, Family, Friends  Occupation: Retired  IADL Comments: Pt. was driving, independent with ADL & IADL including cooking & cleaning.      Dominant hand: right    Subjective:  Communicated with nursing prior to session.  Pt./spouse agreeable to OT eval/treat.   Chief Complaint: No c/o pain.  Pt. Expressed that she can use R-hand but that "it feels off."  Patient/Family stated goals: Return to PLOF.     Pain Ratin/10                   Objective:  Patient found with: telemetry, SCD, blood pressure cuff, peripheral IV    Cognitive Exam:  Oriented to: Person, Place, Time and Situation  Follows Commands/attention: Follows multistep  commands  Communication: clear/fluent  Memory:  Required occasional increased processing.  Safety awareness/insight to disability: intact  Coping skills/emotional control: Appropriate to situation    Visual/perceptual:  Intact; glasses     Physical Exam:  Postural examination/scapula alignment: Rounded shoulder and Head forward  Skin integrity: Visible skin intact  Edema: None noted     Sensation:   Intact    Upper Extremity Range of Motion:  Right Upper Extremity: WFL  Left Upper Extremity: WFL    Upper Extremity Strength:  Right Upper Extremity: Gross 4+/5 except 3+/5 shoulder  Left Upper Extremity: WFL   Strength: R- weaker than L    Fine motor coordination:   Intact  Left hand thumb/finger opposition skills and Left hand, diadochokinesis skill ; RUE mildly impaired DDK & finger to thumb opposition.     Gross motor coordination: WFL    Functional Mobility:  Bed Mobility:  Rolling/Turning to Left: Minimum assistance  Scooting/Bridging: Minimum Assistance  Supine to Sit: Minimum Assistance  Sit to Supine: Minimum Assistance    Transfers:  Sit <> Stand Assistance: " Minimum Assistance, Moderate Assistance  Sit <> Stand Assistive Device: Rolling Walker  Toilet Transfer Technique: Stand Pivot  Toilet Transfer Assistance: Moderate Assistance  Toilet Transfer Assistive Device: Rolling Walker, grab bar    Functional Ambulation: Min to modA for ambulation with RW at household distance.    Activities of Daily Living:     Feeding adaptive equipment: None     UE adaptive equipment: None     LE adaptive equipment: None        Toileting Where Assessed: Toilet  Toileting Level of Assistance: Minimum assistance        Bathing adaptive equipment: no assistive device    Balance:   Static Sit: GOOD-: Takes MODERATE challenges from all directions but inconsistently  Dynamic Sit: GOOD-: Maintains balance through MODERATE excursions of active trunk movement,     Static Stand: FAIR: Maintains without assist but unable to take challenges  Dynamic stand: FAIR: Needs CONTACT GUARD during gait    Therapeutic Activities and Exercises:  Initial OT eval complete. Pt. Performed supine <-> sit with assist for RLE. Ambulated with RW & R-knee block for household distance. Stand-pivot toilet T/F with RW, assist for lift/lower, & min v/c for safe hand placement. Required assist for initial clothing management & CGA for clothing management after toileting 2* impaired balance. To benefit from continued OT services to increase independence in self-care. [place] for supine <-> sit. ModA for toilet T/F. OT to follow.     AM-PAC 6 CLICK ADL  How much help from another person does this patient currently need?  1 = Unable, Total/Dependent Assistance  2 = A lot, Maximum/Moderate Assistance  3 = A little, Minimum/Contact Guard/Supervision  4 = None, Modified [place]/Independent    Putting on and taking off regular lower body clothing? : 2  Bathing (including washing, rinsing, drying)?: 2  Toileting, which includes using toilet, bedpan, or urinal? : 3  Putting on and taking off regular upper body clothing?: 3  Taking  "care of personal grooming such as brushing teeth?: 3  Eating meals?: 4  Total Score: 17    AM-PAC Raw Score CMS "G-Code Modifier Level of Impairment Assistance   6 % Total / Unable   7 - 9 CM 80 - 100% Maximal Assist   10 - 14 CL 60 - 80% Moderate Assist   15 - 19 CK 40 - 60% Moderate Assist   20 - 22 CJ 20 - 40% Minimal Assist   23 CI 1-20% SBA / CGA   24 CH 0% Independent/ Mod I       Patient left supine with all lines intact, call button in reach, nursing notified and spouse present    Assessment:  Antonina Butler is a 71 y.o. female with a medical diagnosis of Lacunar stroke of left subthalamic region and presents with below listed deficits.    Rehab identified problem list/impairments: Rehab identified problem list/impairments: weakness, impaired self care skills, impaired functional mobilty, gait instability, impaired balance, decreased coordination, decreased lower extremity function, decreased ROM    Rehab potential is good.    Activity tolerance: Good    Discharge recommendations: Discharge Facility/Level Of Care Needs: rehabilitation facility     Barriers to discharge: Barriers to Discharge: Decreased caregiver support (Spouse works in AM.)    Equipment recommendations: bath bench, bedside commode     GOALS:   Occupational Therapy Goals        Problem: Occupational Therapy Goal    Goal Priority Disciplines Outcome Interventions   Occupational Therapy Goal     OT, PT/OT Ongoing (interventions implemented as appropriate)    Description:  Goals to be met by: 5/29/2017     Patient will increase functional independence with ADLs by performing:    UE Dressing with Modified [place].  LE Dressing with Modified [place].  Grooming while standing at sink with Modified [place].  Toileting from bedside commode with Modified [place] for hygiene and clothing management.   Bathing from  shower chair/bench with Supervision.  Stand pivot transfers with Modified [place].  Toilet transfer to " bedside commode with Modified [place].    Pt. Has SP cane not in use.  Needs TTB & BSC.  To benefit from OT in In-patient rehab.                PLAN:  Patient to be seen 5 x/week to address the above listed problems via self-care/home management, therapeutic activities, therapeutic exercises  Plan of Care expires: 05/29/17  Plan of Care reviewed with: patient, spouse    OT G-codes  Functional Assessment Tool Used: [facility]  Score: 17  Functional Limitation: Self care  Self Care Current Status (): CK  Self Care Goal Status (): WIDLER Gardner OT  04/29/2017

## 2017-05-01 PROBLEM — I10 ESSENTIAL HYPERTENSION, BENIGN: Chronic | Status: ACTIVE | Noted: 2017-04-28

## 2017-05-01 LAB
DIASTOLIC DYSFUNCTION: YES
ESTIMATED PA SYSTOLIC PRESSURE: 28
MITRAL VALVE MOBILITY: NORMAL
RETIRED EF AND QEF - SEE NOTES: 65 (ref 55–65)
TRICUSPID VALVE REGURGITATION: ABNORMAL

## 2017-05-01 PROCEDURE — 11000001 HC ACUTE MED/SURG PRIVATE ROOM

## 2017-05-01 PROCEDURE — 97535 SELF CARE MNGMENT TRAINING: CPT

## 2017-05-01 PROCEDURE — G8988 SELF CARE GOAL STATUS: HCPCS | Mod: CJ

## 2017-05-01 PROCEDURE — 92526 ORAL FUNCTION THERAPY: CPT

## 2017-05-01 PROCEDURE — 94761 N-INVAS EAR/PLS OXIMETRY MLT: CPT

## 2017-05-01 PROCEDURE — 97530 THERAPEUTIC ACTIVITIES: CPT

## 2017-05-01 PROCEDURE — 97110 THERAPEUTIC EXERCISES: CPT

## 2017-05-01 PROCEDURE — 97532 *HC SP COG SKL DEV EA 15 MIN: CPT

## 2017-05-01 PROCEDURE — G8989 SELF CARE D/C STATUS: HCPCS | Mod: CK

## 2017-05-01 PROCEDURE — 97116 GAIT TRAINING THERAPY: CPT

## 2017-05-01 PROCEDURE — 25000003 PHARM REV CODE 250: Performed by: HOSPITALIST

## 2017-05-01 PROCEDURE — 63600175 PHARM REV CODE 636 W HCPCS: Performed by: HOSPITALIST

## 2017-05-01 RX ADMIN — ATORVASTATIN CALCIUM 40 MG: 40 TABLET, FILM COATED ORAL at 08:05

## 2017-05-01 RX ADMIN — AMLODIPINE BESYLATE 5 MG: 5 TABLET ORAL at 08:05

## 2017-05-01 RX ADMIN — SODIUM CHLORIDE, PRESERVATIVE FREE 3 ML: 5 INJECTION INTRAVENOUS at 05:05

## 2017-05-01 RX ADMIN — SODIUM CHLORIDE, PRESERVATIVE FREE 3 ML: 5 INJECTION INTRAVENOUS at 06:05

## 2017-05-01 RX ADMIN — ASPIRIN 81 MG: 81 TABLET, COATED ORAL at 08:05

## 2017-05-01 RX ADMIN — SODIUM CHLORIDE, PRESERVATIVE FREE 3 ML: 5 INJECTION INTRAVENOUS at 09:05

## 2017-05-01 RX ADMIN — ENOXAPARIN SODIUM 40 MG: 100 INJECTION SUBCUTANEOUS at 05:05

## 2017-05-01 NOTE — PROGRESS NOTES
TN uploaded IPR consult to [facility] and sent referral to [facility].  Dylon Olivas RN  Transitional Navigator/Case Management  [phone]

## 2017-05-01 NOTE — PLAN OF CARE
Problem: Patient Care Overview  Goal: Plan of Care Review  Outcome: Ongoing (interventions implemented as appropriate)  Plan of care reviewed with patient. Patient verbalized understanding. Neuro checks performed Q4H. Patient exhibits drift, weakness in RLE and RUE; otherwise, patient is neurologically intact. Patient denies pain/discomfort. Patient NSR-sinus bradycardia on telemetry monitoring, HR 50's-60's. Bed is low and locked, bed alarm is activated, call light is within reach. Patient has been instructed to call if in need of assistance. Verbalized understanding.

## 2017-05-01 NOTE — PT/OT/SLP PROGRESS
Speech Language Pathology  Treatment    Antonina Butler   MRN: [id number]   Admitting Diagnosis: Lacunar stroke of left subthalamic region    Diet recommendations: Solid Diet Level: Dental Soft  Liquid Diet Level: Thin Feed only when awake/alert, HOB to 90 degrees, Small bites/sips, Alternating bites/sips, 1 bite/sip at a time, Remain upright 30 minutes post meal, Meds whole 1 at a time, Eliminate distractions and Avoid talking while eating    SLP Treatment Date: 17  Speech Start Time: 123     Speech Stop Time: 1255     Speech Total (min): 20 min       TREATMENT BILLABLE MINUTES:  Treatment Swallowing Dysfunction 10 minutes and Seld Care/Home Management Training 10 minutes    Has the patient been evaluated by SLP for swallowing? : Yes  Keep patient NPO?: No   General Precautions: Standard, aspiration, fall  Current Respiratory Status:  (room air)       Subjective:  Pt awake in bed after finishing lunch. No swallowing difficulties noted w/ dental soft meal.    Pain Ratin/10  Pain Rating Post-Intervention: 0/10    Objective:   Patient found with: telemetry, blood pressure cuff, peripheral IV  Pt required mod cues to recall swallow precautions to be utilized during meals. No overt clinical s/s of aspiration noted w/ dental soft consistencies or thin liquids. Pt requires supervision w/ ADLs as she requires Supervision to identify current limitations and safety precautions being implemented in the hospital. Pt required min cues to recall possible consequences of ambulating w/o supervision.    FIM:  Problem Solvin Supervision--The patient requires supervision (e.g., cueing or coaxing) to solve less routine problems only under stressful or unfamiliar conditions, but no more than 10% of the time.     Assessment:  Antonina Butler is a 71 y.o. female with a medical diagnosis of Lacunar stroke of left subthalamic region and presents with no clinical s/s of dysphagia w/ dental soft/thin liquid PO diet. Mild  cognitive deficits characterized by impaired awareness/insight into deficits and safety/judgment. ST will f/u to address cognition.    Discharge recommendations: Discharge Facility/Level Of Care Needs: rehabilitation facility     Goals:   SLP Goals        Problem: SLP Goal    Goal Priority Disciplines Outcome   SLP Goal     SLP Ongoing (interventions implemented as appropriate)   Description:  Short Term Goals:  1. Pt will consume % of mechanical soft diet with no s/s of airway penetration and/or aspiration given min assist. --MET 5/1  2. Pt will implement safe swallowing strategies 100% of the time during meals given no assist. --MET 5/1  3. Pt will answer safety questions with 100% accuracy given min assist.                 Plan:   Patient to be seen Therapy Frequency: 3 x/week   Plan of Care expires: 05/28/17  Plan of Care reviewed with: patient  SLP Follow-up?: Yes  SLP - Next Visit Date: 05/02/17           Basilia Red CCC-SLP  05/01/2017

## 2017-05-01 NOTE — SUBJECTIVE & OBJECTIVE
Interval History: Says that she is feeling fine today. Wondering when she is going to leave the hospital. Still feeling weak on the right side. Ready to work with therapy this AM.     Review of Systems   Respiratory: Negative for cough and shortness of breath.    Gastrointestinal: Negative for nausea and vomiting.     Objective:     Vital Signs (Most Recent):  Temp: 98.3 °F (36.8 °C) (05/01/17 0800)  Pulse: (!) 54 (05/01/17 0800)  Resp: 18 (05/01/17 0800)  BP: 130/75 (05/01/17 0800)  SpO2: 96 % (05/01/17 0859) Vital Signs (24h Range):  Temp:  [96.8 °F (36 °C)-98.3 °F (36.8 °C)] 98.3 °F (36.8 °C)  Pulse:  [52-67] 54  Resp:  [16-20] 18  SpO2:  [96 %-98 %] 96 %  BP: (108-142)/(65-92) 130/75     Weight: 52.9 kg (116 lb 10 oz)  Body mass index is 21.33 kg/(m^2).    Intake/Output Summary (Last 24 hours) at 05/01/17 1048  Last data filed at 05/01/17 0900   Gross per 24 hour   Intake              860 ml   Output             1400 ml   Net             -540 ml      Physical Exam   Constitutional: She is oriented to person, place, and time. She appears well-developed and well-nourished. No distress.   Neurological: She is alert and oriented to person, place, and time.   Psychiatric: She has a normal mood and affect. Her behavior is normal.   Nursing note and vitals reviewed.      Significant Labs: None    Significant Imaging: None

## 2017-05-01 NOTE — PROGRESS NOTES
The Sw faxed the pt's info via [facility] to [facility].The Sw spoke to Mary at [facility] and she states the pt looks appropriate and she will submit to [facility] today for the rehab auth. The Sw informed her the pt's ready for d/c today.     10:09am The Sw spoke to Zaheer at [facility] and she states she will work it once she receives the IP rehab auth from Mary but the IP rehab request must be sent to their medical director for approval.

## 2017-05-01 NOTE — PLAN OF CARE
Problem: Occupational Therapy Goal  Goal: Occupational Therapy Goal  Goals to be met by: 5/29/2017     Patient will increase functional independence with ADLs by performing:    UE Dressing with Modified [place].  LE Dressing with Modified [place].  Grooming while standing at sink with Modified [place].  Toileting from bedside commode with Modified [place] for hygiene and clothing management.   Bathing from shower chair/bench with Supervision.  Stand pivot transfers with Modified [place].  Toilet transfer to bedside commode with Modified [place].    Pt. Has SP cane not in use. Needs TTB & BSC. To benefit from OT in In-patient rehab.   Outcome: Ongoing (interventions implemented as appropriate)  Pt. Making good progress toward goals with noted increased standing balance and RLE control standing with ADLS; No buckling noted at R knee but did demonstrate hyperextension while performs ADLs and stand step t.f. Pt. Is a high fall risk with impaired sitting balance with swaying and LOB during static and dynamic reaching during LB ADLs.  She performed UB dressing SBA for pullover with increased time with swaying LOB; min vc for midline repositioning and maintaining anterior pelvic tilt and upright posture; LB dressing max assist with high fall risk reaching to LB to thread legs; CGA standing for pulling garment over hips.  Sponge bath mod assist for BLE(lower legs, yanet-area, back and buttocks), toileting min assist for thoroughness with rear hygiene and CGA standing for  Clothes management. Bed<>BSc and BS chair with  SPT with Mod assist to affected R side and min to L side but drags RLE and poor control of extremity 2/2 low tone, decreased motor control and decreased strength.  Pt. Would greatly benefit from IPR. Good potential to regain indep to return to PLOF.  Continue with OT POC.

## 2017-05-01 NOTE — SUBJECTIVE & OBJECTIVE
Interval History: Feeling better today. Still having weakness that keeps her from standing on the right leg. She feels a little more hopeful today.     Review of Systems   Respiratory: Negative for cough and shortness of breath.    Cardiovascular: Negative for chest pain and palpitations.   Gastrointestinal: Negative for nausea and vomiting.     Objective:     Vital Signs (Most Recent):  Temp: 97.8 °F (36.6 °C) (04/30/17 2335)  Pulse: (!) 57 (04/30/17 2335)  Resp: 16 (04/30/17 1930)  BP: 135/77 (04/30/17 2335)  SpO2: 98 % (04/30/17 2347) Vital Signs (24h Range):  Temp:  [97.8 °F (36.6 °C)-98.6 °F (37 °C)] 97.8 °F (36.6 °C)  Pulse:  [48-67] 57  Resp:  [16-20] 16  SpO2:  [96 %-98 %] 98 %  BP: (115-166)/(67-92) 135/77     Weight: 52.9 kg (116 lb 10 oz)  Body mass index is 21.33 kg/(m^2).    Intake/Output Summary (Last 24 hours) at 05/01/17 0107  Last data filed at 04/30/17 2125   Gross per 24 hour   Intake              970 ml   Output             1150 ml   Net             -180 ml      Physical Exam   Constitutional: She is oriented to person, place, and time. She appears well-developed and well-nourished. No distress.   Neurological: She is alert and oriented to person, place, and time.   Psychiatric: She has a normal mood and affect. Her behavior is normal.   Nursing note and vitals reviewed.      Significant Labs: None    Significant Imaging: Nonne

## 2017-05-01 NOTE — PLAN OF CARE
Problem: Physical Therapy Goal  Goal: Physical Therapy Goal  Goals to be met by: 17     Patient will increase functional independence with mobility by performin. Supine to sit with Stand-by Assistance  2. Sit to supine with Stand-by Assistance  3. Sit to stand transfer with Contact Guard Assistance  4. Bed to chair transfer with Contact Guard Assistance using Rolling Walker  5. Gait x 50 feet with Contact Guard Assistance using Rolling Walker.   6. Ascend/descend 3 stair with bilateral Handrails Minimal Assistance   7. Lower extremity exercise program x20 reps with assistance as needed  Rec: In pt rehab DME: RW, W/C, BSC TTB   Outcome: Ongoing (interventions implemented as appropriate)  Patient presents with weakness RLE, decreased static/dynamic balance, decreased coordination/control RLE contributing to decreased safety, decreased functional mob/amb status; pt requires increased time to flex R knee with CGA for alignment in propped position prior to rolling with CGA L using side rail; CG/Klaus with LEs over EOB (in showing pt how to assist RLE with LLE) and CGA for lifting trunk to erect sit with moderate difficulty, decreased fluidity of movement; sit static balance G-, sit dynamic F to F-; scoots to EOB with CGA; sit to stand with modA without RW and Klaus to maintain; min/modA sit to stand with RW and VCs for technique, CG/Klaus to maintain; pt stepped in place then amb 4ft forward and 4ft backward with VCs/tactile cues to correct deviations and min/modA- head down with forward trunk flexion, increased time with advancement and placement of R foot, hyperextends R knee upon stance with decreased stance time R during swing through L; due to increased physical burden on caregiver and decreased safety with mobility, recommend inpatient rehab to maximize strength, balance, coordination and therefore increase safety with functional mob/amb status.

## 2017-05-01 NOTE — PLAN OF CARE
Problem: Patient Care Overview  Goal: Plan of Care Review  Outcome: Ongoing (interventions implemented as appropriate)  Pt's SpO2 96% on RA. No respiratory distress noted. Will continue to monitor SpO2.

## 2017-05-01 NOTE — PT/OT/SLP PROGRESS
Occupational Therapy  Treatment    Antonina Butler   MRN: [id number]   Admitting Diagnosis: Lacunar stroke of left subthalamic region    OT Date of Treatment: 17   OT Start Time: 1232  OT Stop Time: 1310  OT Total Time (min): 38 min    Billable Minutes:  Self Care/Home Management 25, Therapeutic Activity 13 and Total Time 38    General Precautions: Standard, fall, aspiration  Orthopedic Precautions: N/A  Braces: N/A    Do you have any cultural, spiritual, Synagogue conflicts, given your current situation?: None    Subjective:  Communicated with nurse prior to session.      Pain Ratin/10              Pain Rating Post-Intervention: 0/10    Objective:  Patient found with: telemetry     Functional Mobility:  Bed Mobility:  Rolling/Turning to Left: Stand by assistance, With side rail (RLE lag; increased time with mod sequencing cues for segmental /NDT rolling tech)  Rolling/Turning Right: Stand by assistance, With side rail (same as above)  Scooting/Bridging: Minimum Assistance (for Rhip assist /LE seated EOB; SBA in bed; dragging RLE  leg over EOB bed )  Supine to Sit: Stand by Assistance, WIth side rail (slow moving; RLE lag)  Sit to Supine: Minimum Assistance (min assist for RLE assist onto bed -lifting against gravity)    Transfers:   Sit <> Stand Assistance: Minimum Assistance (lifting assist)  Sit <> Stand Assistive Device: Rolling Walker (with and without)  Bed <> Chair Technique: Stand Pivot  Bed <> Chair Transfer Assistance: Moderate Assistance (transferred to R side: unable to lift RLE; hyperextension R knee; poor knee control)  Bed <> Chair Assistive Device: No Assistive Device  Toilet Transfer Technique: Stand Pivot  Toilet Transfer Assistance: Moderate Assistance (RLE decreased muscle tone and control)  Toilet Transfer Assistive Device: bedside commode    Functional Ambulation: NA    Activities of Daily Living:    UE Dressing Level of Assistance: Stand by assistance (pullover with swaying trunk  balance seatedEOB; increased time )  LE Dressing Level of Assistance: Maximum assistance (socks with LOB crossing legs and atempting to bend to reach feet-did manage to reach top of R foot; high fall risk; unable to thread legs into pants and underwear without LOB;  stood min asist/cga for standing balance to pull garments over hip)  Toileting Where Assessed: Bedside commode  Toileting Level of Assistance: Minimum assistance (for thoroughnes with rear hygiene; leans over to reach and high fall risk 2/2 impaired dynamic sitting balance; CGA pulling garmentnup/down in standing)  Bathing Where Assessed: Edge of bed  Bathing Level of Assistance: Moderate assistance (for BLE , periarea and buttocks standing; maintainioed weightbewearing through RLE without buckling LOB)      Balance:   Static Sit: FAIR+: Able to take MINIMAL challenges from all directions  Dynamic Sit: FAIR: Cannot move trunk without losing balance  Static Stand: POOR+: Needs MINIMAL assist to maintain  Dynamic stand: POOR: N/A    Therapeutic Activities and Exercises:  Pt. Instructed in UB dressing SBA for pullover with increased time with swaying LOB; min vc for midline repositioning and maintaining anterior pelvic tilt and upright posture; LB dressing max assist with high fall risk reaching to LB to thread legs; CGA standing for pulling garment over hips.  Sponge bath mod assist for BLE(lower legs, yanet-area, back and buttocks), toileting min assist for thoroughness with rear hygiene and CGA standing for  Clothes management. Bed<>BSc and BS chair with  SPT with Mod assist to affected R side and min to L side but drags RLE and poor control of extremity 2/2 low tone, decreased motor control and decreased strength.  Pt. Would greatly benefit from IPR. Good potential to regain indep to return to PLOF.  Continue with OT POC.       AM-PAC 6 CLICK ADL   How much help from another person does this patient currently need?   1 = Unable, Total/Dependent  Assistance  2 = A lot, Maximum/Moderate Assistance  3 = A little, Minimum/Contact Guard/Supervision  4 = None, Modified [place]/Independent    Putting on and taking off regular lower body clothing? : 2  Bathing (including washing, rinsing, drying)?: 2  Toileting, which includes using toilet, bedpan, or urinal? : 3  Putting on and taking off regular upper body clothing?: 3  Taking care of personal grooming such as brushing teeth?: 3  Eating meals?: 4  Total Score: 17     AM-PAC Raw Score CMS G-Code Modifier Level of Impairment Assistance   6 % Total / Unable   7 - 9 CM 80 - 100% Maximal Assist   10 - 14 CL 60 - 80% Moderate Assist   15 - 19 CK 40 - 60% Moderate Assist   20 - 22 CJ 20 - 40% Minimal Assist   23 CI 1-20% SBA / CGA   24 CH 0% Independent/ Mod I     Patient left up in chair with all lines intact, call button in reach and nurse notified    ASSESSMENT:  Antonina Butler is a 71 y.o. female with a medical diagnosis of Lacunar stroke of left subthalamic region and presents with good progress toward goals with noted increased standing balance and RLE control standing with ADLS; No buckling noted at R knee but did demonstrate hyperextension while performs ADLs and stand step t.f. Pt. Is a high fall risk with impaired sitting balance with swaying and LOB during static and dynamic reaching during LB ADLs. Pt. would greatly benefit from IPR. Good potential to regain indep to return to PLOF.  Continue with OT POC.       Rehab identified problem list/impairments: Rehab identified problem list/impairments: weakness, impaired self care skills, impaired functional mobilty, impaired sensation, impaired balance, decreased coordination, decreased safety awareness, decreased upper extremity function, gait instability, abnormal tone, decreased lower extremity function    Rehab potential is good.    Activity tolerance: Good    Discharge recommendations: Discharge Facility/Level Of Care Needs: rehabilitation  facility     Barriers to discharge: Barriers to Discharge: Decreased caregiver support    Equipment recommendations: bath bench, bedside commode     GOALS:   Occupational Therapy Goals        Problem: Occupational Therapy Goal    Goal Priority Disciplines Outcome Interventions   Occupational Therapy Goal     OT, PT/OT Ongoing (interventions implemented as appropriate)    Description:  Goals to be met by: 5/29/2017     Patient will increase functional independence with ADLs by performing:    UE Dressing with Modified [place].  LE Dressing with Modified [place].  Grooming while standing at sink with Modified [place].  Toileting from bedside commode with Modified [place] for hygiene and clothing management.   Bathing from  shower chair/bench with Supervision.  Stand pivot transfers with Modified [place].  Toilet transfer to bedside commode with Modified [place].    Pt. Has SP cane not in use.  Needs TTB & BSC.  To benefit from OT in In-patient rehab.                Plan:  Patient to be seen 5 x/week to address the above listed problems via self-care/home management, therapeutic activities, therapeutic exercises, neuromuscular re-education  Plan of Care expires: 06/29/17  Plan of Care reviewed with: patient         Nannette John OT  05/01/2017

## 2017-05-01 NOTE — ASSESSMENT & PLAN NOTE
Right Sided Weakness Leg greater then Arm  Continue ASA 81 mg and atorvastatin 40 mg daily. Appreciate Neuro Consult. Continue ST/PT/OT and they recommend inpatient rehab placement. LDL was 173. Awaiting inpatient rehab placement. SW has been consulted to assist with this.

## 2017-05-01 NOTE — PT/OT/SLP PROGRESS
Physical Therapy  Treatment    Antonina Butler   MRN: [id number]   Admitting Diagnosis: Lacunar stroke of left subthalamic region    PT Received On: 17  PT Start Time: 1103     PT Stop Time: 1142    PT Total Time (min): 39 min       Billable Minutes:  Gait [id number] minutes, Therapeutic Activity 13 minutes and Therapeutic Exercise 13 minutes    Treatment Type: Treatment  PT/PTA: PT     PTA Visit Number: 0       General Precautions: Standard, fall, aspiration  Orthopedic Precautions: N/A   Braces: N/A    Do you have any cultural, spiritual, Scientology conflicts, given your current situation?: None    Subjective:  Communicated with nurse prior to session.      Pain Ratin/10              Pain Rating Post-Intervention: 0/10    Objective:   Patient found with: telemetry    Functional Mobility:  Bed Mobility:   Rolling/Turning to Left: Contact guard assistance  Scooting/Bridging: Contact Guard Assistance (toward EOB in sitting; Klaus transfer toward HOB in supine;)  Supine to Sit: Contact Guard Assistance, Minimum Assistance (in instructing to assist RLE with LLE)  Sit to Supine: Minimum Assistance (RLE)    Transfers:  Sit <> Stand Assistance: Minimum Assistance, Moderate Assistance (modA without AD and Klaus to maintain standing)  Sit <> Stand Assistive Device: Rolling Walker    Gait:   Gait Distance: 4ft foward and 4ft backward with RW  Assistance 1: Minimum assistance, Moderate assistance  Gait Assistive Device: Rolling walker  Gait Pattern: 3-point gait  Gait Deviation(s): decreased duran, forward lean, decreased step length, decreased stride length, decreased velocity of limb motion, decreased swing-to-stance ratio, decreased weight-shifting ability (pt able to advance RLE by herself with decreased coordination, fluidity of movement)    Balance:   Static Sit: GOOD-: Takes MODERATE challenges from all directions but inconsistently  Dynamic Sit: FAIR/FAIR-: Cannot move trunk without losing balance  Static  Stand: POOR+: Needs MINIMAL assist to maintain  Dynamic stand: POOR: N/A     Therapeutic Activities and Exercises:  Performed LE exercise in supine x 10 reps-APs, heel slides with CGA for flexion and resistance to extension portion; assisted SLR, hip abd/add with CG/Klaus with hip at 0* and resistance in hooklying position with hip abd/add, B bridging with CG/Klaus with increased time to complete; pt requires increased time to flex R knee with CGA for alignment in propped position prior to rolling with CGA L using side rail; CG/Klaus with LEs over EOB (in showing pt how to assist RLE with LLE) and CGA for lifting trunk to erect sit with moderate difficulty, decreased fluidity of movement; performed dynamic reaching in all quadrants at EOB; scoots to EOB with CGA; sit static balance G-, sit dynamic F to F-;  sit to stand with modA without RW and Klaus to maintain; on 2nd trial; min/modA sit to stand with RW and VCs for technique, slow ascent, CG/Klaus to maintain; pt stepped in place then amb 4ft forward and 4ft backward with VCs/tactile cues to correct deviations and min/modA- head down with forward trunk flexion, increased time with advancement and placement of R foot, hyperextends R knee upon stance with decreased stance time R during swing through L; pt c/o fatigue and scooted toward HOB with CGA; sit to supine with Klaus for RLE and pt scooted toward HOB in supine with Klaus using LEs to assist; will cont with POC in PM.     AM-PAC 6 CLICK MOBILITY  How much help from another person does this patient currently need?   1 = Unable, Total/Dependent Assistance  2 = A lot, Maximum/Moderate Assistance  3 = A little, Minimum/Contact Guard/Supervision  4 = None, Modified [place]/Independent    Turning over in bed (including adjusting bedclothes, sheets and blankets)?: 3  Sitting down on and standing up from a chair with arms (e.g., wheelchair, bedside commode, etc.): 2  Moving from lying on back to sitting on the side of  the bed?: 3  Moving to and from a bed to a chair (including a wheelchair)?: 2  Need to walk in hospital room?: 2  Climbing 3-5 steps with a railing?: 2  Total Score: 14    AM-PAC Raw Score CMS G-Code Modifier Level of Impairment Assistance   6 % Total / Unable   7 - 9 CM 80 - 100% Maximal Assist   10 - 14 CL 60 - 80% Moderate Assist   15 - 19 CK 40 - 60% Moderate Assist   20 - 22 CJ 20 - 40% Minimal Assist   23 CI 1-20% SBA / CGA   24 CH 0% Independent/ Mod I     Patient left HOB elevated with all lines intact, call button in reach, bed alarm on and nurse notified.    Assessment:  Antonina Butler is a 71 y.o. female with a medical diagnosis of Lacunar stroke of left subthalamic region   Patient presents with weakness RLE, decreased static/dynamic balance, decreased coordination/control RLE contributing to decreased safety, decreased functional mob/amb status; pt requires increased time to flex R knee with CGA for alignment in propped position prior to rolling with CGA L using side rail; CG/Klaus with LEs over EOB (in showing pt how to assist RLE with LLE) and CGA for lifting trunk to erect sit with moderate difficulty, decreased fluidity of movement; sit static balance G-, sit dynamic F to F-; scoots to EOB with CGA; sit to stand with modA without RW and Klaus to maintain; min/modA sit to stand with RW and VCs for technique, CG/Klaus to maintain; pt stepped in place then amb 4ft forward and 4ft backward with VCs/tactile cues to correct deviations and min/modA- head down with forward trunk flexion, increased time with advancement and placement of R foot, hyperextends R knee upon stance with decreased stance time R during swing through L; due to increased physical burden on caregiver and decreased safety with mobility, recommend inpatient rehab to maximize strength, balance, coordination and therefore increase safety with functional mob/amb status.  Rehab identified problem list/impairments: Rehab identified  problem list/impairments: weakness, impaired endurance, impaired sensation, impaired self care skills, impaired balance, gait instability, impaired functional mobilty, abnormal tone, decreased lower extremity function, decreased upper extremity function, decreased safety awareness    Rehab potential is good.    Activity tolerance: Good    Discharge recommendations: Discharge Facility/Level Of Care Needs: rehabilitation facility     Barriers to discharge: Barriers to Discharge: Decreased caregiver support    Equipment recommendations: Equipment Needed After Discharge:  (TBD)     GOALS:   Physical Therapy Goals        Problem: Physical Therapy Goal    Goal Priority Disciplines Outcome Goal Variances Interventions   Physical Therapy Goal     PT/OT, PT Ongoing (interventions implemented as appropriate)     Description:  Goals to be met by: 17     Patient will increase functional independence with mobility by performin. Supine to sit with Stand-by Assistance  2. Sit to supine with Stand-by Assistance  3. Sit to stand transfer with Contact Guard Assistance  4. Bed to chair transfer with Contact Guard Assistance using Rolling Walker  5. Gait  x 50 feet with Contact Guard Assistance using Rolling Walker.   6. Ascend/descend 3 stair with bilateral Handrails Minimal Assistance     7. Lower extremity exercise program x20 reps  with assistance as needed  Rec:  In pt rehab    DME: JORDON, W/C, BSC TTB                PLAN:    Patient to be seen 6 x/week  to address the above listed problems via gait training, therapeutic activities, therapeutic exercises, neuromuscular re-education  Plan of Care expires: 17  Plan of Care reviewed with: patient         Mery NORTH Roneyfarhana, PT  2017

## 2017-05-01 NOTE — PROGRESS NOTES
"[facility] Hospital Medicine  Progress Note    Patient Name: Antonina Butler  MRN: [id number]  Patient Class: OP- Observation   Admission Date: 4/28/2017  Length of Stay: 0 days  Attending Physician: Eleazar Cook MD  Primary Care Provider: Goran Stern MD        Subjective:     Principal Problem:Lacunar stroke of left subthalamic region    HPI:  Antonina Butler is a 71-year-old female with a history of hypertension, and bilateral mastectomy due to fibroids who presented to [facility] ED with complaints of weakness to the right leg and less so to the right arm.  This started yesterday.  She states she previously had had some episodes where she felt weak on the right, but never this severe. Denies any pain associated with this. No headache, neck pain, or back pain. No numbness. She states she also has had some difficulty with coordination with the right hand and drops things. ED workup showed normal CBC,CMP,  Elevated Cholestoral, Negative Troponin, Normal TSH, and a normal, Chest X-Ray and Head CT. She was transferred to [facility] to Hospital Medicine Service for TIA with a full stroke workup and neurology consult.    Upon arrival, she was mildly confused.  She could not remember the year and when asked why she came to the hospital, she stated "Because someone was chasing me."  She was oriented to person and place.  I appreciated a slight left facial droop and mild left leg and left arm weakness on exam.          Hospital Course:       Interval History: Feeling better today. Still having weakness that keeps her from standing on the right leg. She feels a little more hopeful today.     Review of Systems   Respiratory: Negative for cough and shortness of breath.    Cardiovascular: Negative for chest pain and palpitations.   Gastrointestinal: Negative for nausea and vomiting.     Objective:     Vital Signs (Most Recent):  Temp: 97.8 °F (36.6 °C) (04/30/17 " 2335)  Pulse: (!) 57 (04/30/17 2335)  Resp: 16 (04/30/17 1930)  BP: 135/77 (04/30/17 2335)  SpO2: 98 % (04/30/17 2347) Vital Signs (24h Range):  Temp:  [97.8 °F (36.6 °C)-98.6 °F (37 °C)] 97.8 °F (36.6 °C)  Pulse:  [48-67] 57  Resp:  [16-20] 16  SpO2:  [96 %-98 %] 98 %  BP: (115-166)/(67-92) 135/77     Weight: 52.9 kg (116 lb 10 oz)  Body mass index is 21.33 kg/(m^2).    Intake/Output Summary (Last 24 hours) at 05/01/17 0107  Last data filed at 04/30/17 2125   Gross per 24 hour   Intake              970 ml   Output             1150 ml   Net             -180 ml      Physical Exam   Constitutional: She is oriented to person, place, and time. She appears well-developed and well-nourished. No distress.   Neurological: She is alert and oriented to person, place, and time.   Psychiatric: She has a normal mood and affect. Her behavior is normal.   Nursing note and vitals reviewed.      Significant Labs: None    Significant Imaging: Nonne    Assessment/Plan:      * Lacunar stroke of left subthalamic region  Right Sided Weakness Leg greater then Arm  Continue ASA 81 mg and atorvastatin 40 mg daily. Appreciate Neuro Consult. Continue ST/PT/OT. Recommend inpatient rehab placement. Echo read is pending. LDL was 173.     Essential hypertension, benign  Restarted amlodipine 5 mg daily. SBP ranged 133 to 166.     VTE Risk Mitigation         Ordered     Medium Risk of VTE  Once      04/28/17 2129     Reason for No Pharmacological VTE Prophylaxis  Once      04/28/17 2129     Place sequential compression device  Until discontinued      04/28/17 2129     enoxaparin injection 40 mg  Daily     Route:  Subcutaneous        04/28/17 2129          Eleazar Cook MD  Department of Hospital Medicine   [facility]

## 2017-05-01 NOTE — PROGRESS NOTES
"[facility] Hospital Medicine  Progress Note    Patient Name: Antonina Butler  MRN: [id number]  Patient Class: OP- Observation   Admission Date: 4/28/2017  Length of Stay: 0 days  Attending Physician: Eleazar Cook MD  Primary Care Provider: Goran Stern MD      Subjective:     Principal Problem:Lacunar stroke of left subthalamic region    HPI:  Antonina Butler is a 71-year-old female with a history of hypertension, and bilateral mastectomy due to fibroids who presented to [facility] ED with complaints of weakness to the right leg and less so to the right arm.  This started yesterday.  She states she previously had had some episodes where she felt weak on the right, but never this severe. Denies any pain associated with this. No headache, neck pain, or back pain. No numbness. She states she also has had some difficulty with coordination with the right hand and drops things. ED workup showed normal CBC,CMP,  Elevated Cholestoral, Negative Troponin, Normal TSH, and a normal, Chest X-Ray and Head CT. She was transferred to [facility] to Hospital Medicine Service for TIA with a full stroke workup and neurology consult.    Upon arrival, she was mildly confused.  She could not remember the year and when asked why she came to the hospital, she stated "Because someone was chasing me."  She was oriented to person and place.  I appreciated a slight left facial droop and mild left leg and left arm weakness on exam.          Hospital Course:  Ms. Butler underwent stroke evaluation. She was found to have a 6 mm lacunar infarction in the posterior limb of the left internal capsule. She did not have any stenosis on her MRA of the brain and neck. Her echo showed diastolic dysfunction, but normal LV function and normal LA size. Her blood pressure was controlled with amlodipine 5 mg daily. Her LDL was found to be 173 and she was started on atorvastatin 40 mg daily. She was " started on ASA 81 mg daily as well. She was evaluated by [facility] and those recommendations were followed. PT/OT/SLP evaluated her and recommended inpatient rehab.     Interval History: Says that she is feeling fine today. Wondering when she is going to leave the hospital. Still feeling weak on the right side. Ready to work with therapy this AM.     Review of Systems   Respiratory: Negative for cough and shortness of breath.    Gastrointestinal: Negative for nausea and vomiting.     Objective:     Vital Signs (Most Recent):  Temp: 98.3 °F (36.8 °C) (05/01/17 0800)  Pulse: (!) 54 (05/01/17 0800)  Resp: 18 (05/01/17 0800)  BP: 130/75 (05/01/17 0800)  SpO2: 96 % (05/01/17 0859) Vital Signs (24h Range):  Temp:  [96.8 °F (36 °C)-98.3 °F (36.8 °C)] 98.3 °F (36.8 °C)  Pulse:  [52-67] 54  Resp:  [16-20] 18  SpO2:  [96 %-98 %] 96 %  BP: (108-142)/(65-92) 130/75     Weight: 52.9 kg (116 lb 10 oz)  Body mass index is 21.33 kg/(m^2).    Intake/Output Summary (Last 24 hours) at 05/01/17 1048  Last data filed at 05/01/17 0900   Gross per 24 hour   Intake              860 ml   Output             1400 ml   Net             -540 ml      Physical Exam   Constitutional: She is oriented to person, place, and time. She appears well-developed and well-nourished. No distress.   Neurological: She is alert and oriented to person, place, and time.   Psychiatric: She has a normal mood and affect. Her behavior is normal.   Nursing note and vitals reviewed.      Significant Labs: None    Significant Imaging: None    Assessment/Plan:      * Lacunar stroke of left subthalamic region  Right Sided Weakness Leg greater then Arm  Continue ASA 81 mg and atorvastatin 40 mg daily. Appreciate Neuro Consult. Continue ST/PT/OT and they recommend inpatient rehab placement. LDL was 173. Awaiting inpatient rehab placement. SW has been consulted to assist with this.     Essential hypertension, benign  Continue amlodipine 5 mg daily. SBP ranged 108 to 142.      VTE Risk Mitigation         Ordered     Medium Risk of VTE  Once      04/28/17 2129     Reason for No Pharmacological VTE Prophylaxis  Once      04/28/17 2129     Place sequential compression device  Until discontinued      04/28/17 2129     enoxaparin injection 40 mg  Daily     Route:  Subcutaneous        04/28/17 2129          Eleazar Cook MD  Department of Hospital Medicine   [facility]

## 2017-05-02 ENCOUNTER — HOSPITAL ENCOUNTER (INPATIENT)
Facility: HOSPITAL | Age: 72
LOS: 14 days | Discharge: HOME | DRG: 057 | End: 2017-05-16
Attending: PHYSICAL MEDICINE & REHABILITATION | Admitting: PHYSICAL MEDICINE & REHABILITATION
Payer: MEDICARE

## 2017-05-02 VITALS
DIASTOLIC BLOOD PRESSURE: 73 MMHG | WEIGHT: 116.63 LBS | HEART RATE: 59 BPM | RESPIRATION RATE: 18 BRPM | OXYGEN SATURATION: 96 % | TEMPERATURE: 98 F | HEIGHT: 62 IN | BODY MASS INDEX: 21.46 KG/M2 | SYSTOLIC BLOOD PRESSURE: 126 MMHG

## 2017-05-02 DIAGNOSIS — I63.9 LEFT-SIDED CEREBROVASCULAR ACCIDENT (CVA): ICD-10-CM

## 2017-05-02 DIAGNOSIS — I63.81 LACUNAR STROKE OF LEFT SUBTHALAMIC REGION: ICD-10-CM

## 2017-05-02 DIAGNOSIS — R29.898 LEG WEAKNESS: Primary | ICD-10-CM

## 2017-05-02 DIAGNOSIS — R29.898 WEAKNESS OF RIGHT LOWER EXTREMITY: ICD-10-CM

## 2017-05-02 DIAGNOSIS — I10 ESSENTIAL HYPERTENSION, BENIGN: Chronic | ICD-10-CM

## 2017-05-02 PROCEDURE — 63600175 PHARM REV CODE 636 W HCPCS: Performed by: HOSPITALIST

## 2017-05-02 PROCEDURE — 25000003 PHARM REV CODE 250: Performed by: PHYSICAL MEDICINE & REHABILITATION

## 2017-05-02 PROCEDURE — 97532 *HC SP COG SKL DEV EA 15 MIN: CPT

## 2017-05-02 PROCEDURE — 12800000 HC REHAB SEMI-PRIVATE ROOM

## 2017-05-02 PROCEDURE — 97110 THERAPEUTIC EXERCISES: CPT

## 2017-05-02 PROCEDURE — G8980 MOBILITY D/C STATUS: HCPCS | Mod: CK

## 2017-05-02 PROCEDURE — 97116 GAIT TRAINING THERAPY: CPT

## 2017-05-02 PROCEDURE — 94799 UNLISTED PULMONARY SVC/PX: CPT

## 2017-05-02 PROCEDURE — G8979 MOBILITY GOAL STATUS: HCPCS | Mod: CI

## 2017-05-02 PROCEDURE — 94761 N-INVAS EAR/PLS OXIMETRY MLT: CPT

## 2017-05-02 PROCEDURE — 97530 THERAPEUTIC ACTIVITIES: CPT

## 2017-05-02 PROCEDURE — 25000003 PHARM REV CODE 250: Performed by: HOSPITALIST

## 2017-05-02 PROCEDURE — 99223 1ST HOSP IP/OBS HIGH 75: CPT | Mod: ,,, | Performed by: PHYSICAL MEDICINE & REHABILITATION

## 2017-05-02 RX ORDER — AMLODIPINE BESYLATE 5 MG/1
5 TABLET ORAL DAILY
Status: DISCONTINUED | OUTPATIENT
Start: 2017-05-03 | End: 2017-05-14

## 2017-05-02 RX ORDER — ATORVASTATIN CALCIUM 40 MG/1
40 TABLET, FILM COATED ORAL DAILY
Status: ON HOLD
Start: 2017-05-02 | End: 2017-05-16

## 2017-05-02 RX ORDER — ENOXAPARIN SODIUM 100 MG/ML
40 INJECTION SUBCUTANEOUS EVERY 24 HOURS
Status: DISCONTINUED | OUTPATIENT
Start: 2017-05-02 | End: 2017-05-16 | Stop reason: HOSPADM

## 2017-05-02 RX ORDER — ATORVASTATIN CALCIUM 20 MG/1
40 TABLET, FILM COATED ORAL DAILY
Status: DISCONTINUED | OUTPATIENT
Start: 2017-05-03 | End: 2017-05-16 | Stop reason: HOSPADM

## 2017-05-02 RX ORDER — ENOXAPARIN SODIUM 100 MG/ML
40 INJECTION SUBCUTANEOUS EVERY 24 HOURS
Status: CANCELLED | OUTPATIENT
Start: 2017-05-02

## 2017-05-02 RX ORDER — ATORVASTATIN CALCIUM 40 MG/1
40 TABLET, FILM COATED ORAL DAILY
Status: CANCELLED | OUTPATIENT
Start: 2017-05-02

## 2017-05-02 RX ORDER — BISACODYL 10 MG
10 SUPPOSITORY, RECTAL RECTAL DAILY PRN
Status: DISCONTINUED | OUTPATIENT
Start: 2017-05-02 | End: 2017-05-16 | Stop reason: HOSPADM

## 2017-05-02 RX ORDER — ASPIRIN 81 MG/1
81 TABLET ORAL DAILY
Refills: 0 | COMMUNITY
Start: 2017-05-02 | End: 2018-06-28

## 2017-05-02 RX ORDER — DOCUSATE SODIUM 100 MG/1
100 CAPSULE, LIQUID FILLED ORAL 2 TIMES DAILY
Status: DISCONTINUED | OUTPATIENT
Start: 2017-05-02 | End: 2017-05-16 | Stop reason: HOSPADM

## 2017-05-02 RX ORDER — ASPIRIN 81 MG/1
81 TABLET ORAL DAILY
Status: DISCONTINUED | OUTPATIENT
Start: 2017-05-03 | End: 2017-05-16 | Stop reason: HOSPADM

## 2017-05-02 RX ORDER — ASPIRIN 81 MG/1
81 TABLET ORAL DAILY
Status: CANCELLED | OUTPATIENT
Start: 2017-05-02

## 2017-05-02 RX ORDER — ACETAMINOPHEN 325 MG/1
650 TABLET ORAL EVERY 6 HOURS PRN
Status: CANCELLED | OUTPATIENT
Start: 2017-05-02

## 2017-05-02 RX ORDER — ONDANSETRON 8 MG/1
8 TABLET, ORALLY DISINTEGRATING ORAL EVERY 8 HOURS PRN
Status: DISCONTINUED | OUTPATIENT
Start: 2017-05-02 | End: 2017-05-16 | Stop reason: HOSPADM

## 2017-05-02 RX ORDER — AMLODIPINE BESYLATE 5 MG/1
5 TABLET ORAL DAILY
Status: CANCELLED | OUTPATIENT
Start: 2017-05-02

## 2017-05-02 RX ORDER — ACETAMINOPHEN 325 MG/1
650 TABLET ORAL EVERY 6 HOURS PRN
Status: DISCONTINUED | OUTPATIENT
Start: 2017-05-02 | End: 2017-05-16 | Stop reason: HOSPADM

## 2017-05-02 RX ORDER — ONDANSETRON 8 MG/1
8 TABLET, ORALLY DISINTEGRATING ORAL EVERY 8 HOURS PRN
Status: CANCELLED | OUTPATIENT
Start: 2017-05-02

## 2017-05-02 RX ADMIN — ENOXAPARIN SODIUM 40 MG: 100 INJECTION SUBCUTANEOUS at 05:05

## 2017-05-02 RX ADMIN — ATORVASTATIN CALCIUM 40 MG: 40 TABLET, FILM COATED ORAL at 09:05

## 2017-05-02 RX ADMIN — AMLODIPINE BESYLATE 5 MG: 5 TABLET ORAL at 09:05

## 2017-05-02 RX ADMIN — SODIUM CHLORIDE, PRESERVATIVE FREE 3 ML: 5 INJECTION INTRAVENOUS at 05:05

## 2017-05-02 RX ADMIN — DOCUSATE SODIUM 100 MG: 100 CAPSULE, LIQUID FILLED ORAL at 07:05

## 2017-05-02 RX ADMIN — ASPIRIN 81 MG: 81 TABLET, COATED ORAL at 09:05

## 2017-05-02 NOTE — PT/OT/SLP PROGRESS
"Speech Language Pathology  Treatment    Antonina Butler   MRN: [id number]   Admitting Diagnosis: Lacunar stroke of left subthalamic region    Diet recommendations: Solid Diet Level: Dental Soft  Liquid Diet Level: Thin Feed only when awake/alert, HOB to 90 degrees, Small bites/sips, Alternating bites/sips, 1 bite/sip at a time, Remain upright 30 minutes post meal, Meds whole 1 at a time, Eliminate distractions and Avoid talking while eating    SLP Treatment Date: 17  Speech Start Time: 955     Speech Stop Time:      Speech Total (min): 20 min       TREATMENT BILLABLE MINUTES:  Speech Therapy Individual 20 minutes    Has the patient been evaluated by SLP for swallowing? : Yes  Keep patient NPO?: No   General Precautions: Standard, fall  Current Respiratory Status:  (room air)       Subjective:  "Since they are coming to get me, would you mind helping me get my stuff together?"    Pain Ratin/10  Pain Rating Post-Intervention: 0/10    Objective:   Patient found with: telemetry   Pt requiring supervision to identify safety precautions being implemented in the hospital. Pt required min cues to identify current limitations and to present with appropriate safety/judgment and awareness/insight into deficits. ST provided skilled education re: current limitations and safety precautions being implemented at this time and reasons why these precautions are being implemented for pt's safety.    FIM:  Problem Solvin Supervision--The patient requires supervision (e.g., cueing or coaxing) to solve less routine problems only under stressful or unfamiliar conditions, but no more than 10% of the time.     Assessment:  Antonina Butler is a 71 y.o. female with a medical diagnosis of Lacunar stroke of left subthalamic region and presents with mild cognitive deficits characterized by poor safety/judgment and awareness/insight into deficits. Pt to be D/Chapin this date to inpatient rehab setting. No additional acute ST " services warranted at this time.    Discharge recommendations: Discharge Facility/Level Of Care Needs: rehabilitation facility     Goals:   SLP Goals        Problem: SLP Goal    Goal Priority Disciplines Outcome   SLP Goal     SLP Unable to achieve outcome(s) by discharge   Description:  Short Term Goals:  1. Pt will consume % of mechanical soft diet with no s/s of airway penetration and/or aspiration given min assist. --MET 5/1  2. Pt will implement safe swallowing strategies 100% of the time during meals given no assist. --MET 5/1  3. Pt will answer safety questions with 100% accuracy given min assist.                 Plan:   Plan of Care expires: 05/02/17  Plan of Care reviewed with: patient  SLP Follow-up?: No  SLP - Next Visit Date: 05/02/17           Basilia Red CCC-SLP  05/02/2017

## 2017-05-02 NOTE — IP AVS SNAPSHOT
[facility]  [address]  [facility] [address]  Phone: [phone]           Patient Discharge Instructions   Our goal is to set you up for success. This packet includes information on your condition, medications, and your home care.  It will help you care for yourself to prevent having to return to the hospital.     Please ask your nurse if you have any questions.      There are many details to remember when preparing to leave the hospital. Here is what you will need to do:    1. Take your medicine. If you are prescribed medications, review your Medication List on the following pages. You may have new medications to  at the pharmacy and others that you'll need to stop taking. Review the instructions for how and when to take your medications. Talk with your doctor or nurses if you are unsure of what to do.     2. Go to your follow-up appointments. Specific follow-up information is listed in the following pages. Your may be contacted by a nurse or clinical provider about future appointments. Be sure we have all of the phone numbers to reach you. Please contact your provider's office if you are unable to make an appointment.     3. Watch for warning signs. Your doctor or nurse will give you detailed warning signs to watch for and when to call for assistance. These instructions may also include educational information about your condition. If you experience any of warning signs to your health, call your doctor.           [facility]  Unless otherwise directed by your provider, please   contact [facility], our nurse care line   that is available for 24/7 assistance.     [phone] (toll-free)     Registered nurses in the [facility]   provide: appointment scheduling, clinical advisement, health education, and other advisory services.                  ** Verify the list of medication(s) below is accurate and up to date. Carry this with you in case of  emergency. If your medications have changed, please notify your healthcare provider.             Medication List      CONTINUE taking these medications        Additional Info                      amlodipine 2.5 MG tablet   Commonly known as:  NORVASC   Quantity:  60 tablet   Refills:  3   Dose:  5 mg    Last time this was given:  5 mg on 5/16/2017  7:47 AM   Instructions:  Take 2 tablets (5 mg total) by mouth once daily.     Begin Date    AM    Noon    PM    Bedtime       aspirin 81 MG EC tablet   Commonly known as:  ECOTRIN   Refills:  0   Dose:  81 mg    Last time this was given:  81 mg on 5/16/2017  7:47 AM   Instructions:  Take 1 tablet (81 mg total) by mouth once daily.     Begin Date    AM    Noon    PM    Bedtime       atorvastatin 40 MG tablet   Commonly known as:  LIPITOR   Refills:  0   Dose:  40 mg    Last time this was given:  40 mg on 5/16/2017  7:47 AM   Instructions:  Take 1 tablet (40 mg total) by mouth once daily.     Begin Date    AM    Noon    PM    Bedtime         STOP taking these medications     ibuprofen 200 MG tablet   Commonly known as:  ADVIL,MOTRIN            Where to Get Your Medications      These medications were sent to [facility]/pharmacy #[id number] - [place] - [address] AT [address] [facility]  [address], [place] [address]     Phone:  [phone]     amlodipine 2.5 MG tablet                  Please bring to all follow up appointments:    1. A copy of your discharge instructions.  2. All medicines you are currently taking in their original bottles.  3. Identification and insurance card.    Please arrive 15 minutes ahead of scheduled appointment time.    Please call 24 hours in advance if you must reschedule your appointment and/or time.        Your Scheduled Appointments     Jun 01, 2017  9:00 AM CDT   New Patient with GILLIAN Naidu - Neurology ([facility] )    [address]  [facility] [address] [phone]             "  Follow-up Information     Follow up with Goran Stern MD In 1 month.    Specialty:  Cardiology    Contact information:    [place] [address]  [phone]          Follow up with Arlene Ramirez MD In 2 months.    Specialty:  Physical Medicine and Rehabilitation    Why:  As needed    Contact information:    [address]  [address]  [place] [address] [phone]          Follow up with [facility].    Specialty:  Neurology    Why:  managed at [facility] for a stroke     Contact information:    [address]  [facility] [address] [phone]    Additional information:    [address]th Floor      Referrals     Future Orders    Referral to OutPatient  Physical therapy     Questions:    Post Surgical?:  No    Eval and Treat:  Yes    Duration:      Frequency (times per week):      Precautions:      Location:      OT Location:      Restore Functional ADL Training?:      Gait Training:      Therapeutic Exercises:      Wound Care:      Traction:      Electric Stimulation:      IONTO.:      U/S:      Developmental Stimulation?:      Specialty Programs:      Referral to Outpatient Occupational therapy     Questions:    Post Surgical?:  No    OT Location:      Restore Functional ADL Training?:      Therapeutic Exercises:      Electric Stimulation:      IONTO.:      U/S:      Developmental Stimulation?:      Splinting (Specific Area):      Specialty Program:      Referral to OutPatient Speech Therapy     Questions:    Speech Therapy Eval and Treat:      Speech Language Eval and Treat:      Bedside Swallow Study:      Modified Barium Swallow Study:          Discharge Instructions     Future Orders    BATH/SHOWER CHAIR FOR HOME USE     Questions:    Height:  5' 2" (1.575 m)    Weight:  50.3 kg (110 lb 14.3 oz)    Does patient have medical equipment at home?:  cane, straight Comment - pt was not using PTA / pt was not using PTA    other (see comments)    Length of " "need (1-99 months):  99    Type:  With back    Vendor:  [facility] Comment - Harini to pull from Rehab closet     Expected Date of Delivery:  5/16/2017    Expected Time of Delivery:      Diet general     Questions:    Total calories:      Fat restriction, if any:      Protein restriction, if any:      Na restriction, if any:      Fluid restriction:      Additional restrictions:      Diet general     Questions:    Total calories:      Fat restriction, if any:      Protein restriction, if any:      Na restriction, if any:      Fluid restriction:      Additional restrictions:  Cardiac (Low Na/Chol)    WALKER FOR HOME USE     Questions:    Type of Walker:  Jose (4'4"-5'7")    With wheels?:  Yes    Height:  5' 2" (1.575 m)    Weight:  50.3 kg (110 lb 14.3 oz)    Length of need (1-99 months):  99    Platform attachment:      Accessories/Other:      Assistance needed:      Does patient have medical equipment at home?:  cane, straight Comment - pt was not using PTA / pt was not using PTA    other (see comments)    Please check all that apply:  Patient's condition impairs ambulation.    Patient is unable to safely ambulate without equipment.    Patient needs help to get in and out of chair.    Vendor:  [facility] Comment - Harini to pull from Rehab closet     Expected Date of Delivery:  5/16/2017    Expected Time of Delivery:          Discharge Instructions       [facility] [phone].  You will be contacted to schedule your therapy.    Harini Espitia, Rehab , please call if I may be of further assistance.      Admission Information     Date & Time Provider Department CSN    5/2/2017  1:26 PM Arlene Ramirez MD [facility] [id number]      Care Providers     Provider Role Specialty Primary office phone    Arlene Ramirez MD Attending Provider Physical Medicine and Rehabilitation [phone]      Your Vitals Were     BP Pulse Temp Resp Height " "Weight    134/85 (BP Location: Right leg, Patient Position: Lying, BP Method: Automatic) 60 97.6 °F (36.4 °C) (Oral) 18 5' 2" (1.575 m) 50.3 kg (110 lb 14.3 oz)    SpO2 BMI             96% 20.28 kg/m2         Recent Lab Values        4/29/2017                           5:21 AM           A1C 5.6           Comment for A1C at  5:21 AM on 4/29/2017:  According to ADA guidelines, hemoglobin A1C <7.0% represents  optimal control in non-pregnant diabetic patients.  Different  metrics may apply to specific populations.   Standards of Medical Care in Diabetes - 2016.  For the purpose of screening for the presence of diabetes:  <5.7%     Consistent with the absence of diabetes  5.7-6.4%  Consistent with increasing risk for diabetes   (prediabetes)  >or=6.5%  Consistent with diabetes  Currently no consensus exists for use of hemoglobin A1C  for diagnosis of diabetes for children.        Allergies as of 5/16/2017     No Known Allergies      Advance Directives     An advance directive is a document which, in the event you are no longer able to make decisions for yourself, tells your healthcare team what kind of treatment you do or do not want to receive, or who you would like to make those decisions for you.  If you do not currently have an advance directive, [facility] encourages you to create one.  For more information call:  [phone]), [phone] ([phone]),  or log on to [email or web address].        Language Assistance Services     ATTENTION: Language assistance services are available, free of charge. Please call [phone].      ATENCIÓN: Si habla español, tiene a dodd disposición servicios gratuitos de asistencia lingüística. Llame al [phone].     HOLLEY Ý: N?u b?n nói Ti?ng Vi?t, có các d?ch v? h? tr? ngôn ng? mi?n phí dành cho b?n. G?i s? [phone].        Stroke Education              [facility] Sign-Up     Activating your [facility] account is as easy as 1-2-3!     1) Visit " [email or web address], select Sign Up Now, enter this activation code and your date of birth, then select Next.  [email or web address]  Expires: 6/30/2017 10:18 AM      2) Create a username and password to use when you visit [facility] in the future and select a security question in case you lose your password and select Next.    3) Enter your e-mail address and click Sign Up!    Additional Information  If you have questions, please e-mail [email or web address] or call [phone] to talk to our [facility] staff. Remember, [facility] is NOT to be used for urgent needs. For medical emergencies, dial 911.          [facility] complies with applicable Federal civil rights laws and does not discriminate on the basis of race, color, national origin, age, disability, or sex.

## 2017-05-02 NOTE — H&P
[facility]  History & Physical  Hospital Medicine      SUBJECTIVE:     Chief Complaint/Reason for Admission: Impaired mobility and ADLs    History of Present Illness: 71 year-old white woman with a history of hypertension and bilateral mastectomy due to fibroids presented to [facility] ED 4/28/2017 with complaints of weakness to the right leg and less so to the right arm that started the day  Before. She also had some difficulty with coordination with the right hand and dropped things. Transferred to [facility] under [facility] for TIA with a full stroke workup and neurology consult. Found to have a 6 mm lacunar infarction in the posterior limb of the left internal capsule. She did not have any stenosis on her MRA of the brain and neck. Her echo showed diastolic dysfunction, but normal LV function and normal LA size. Her blood pressure was controlled with amlodipine 5 mg daily. Her LDL was found to be 173 and she was started on atorvastatin 40 mg daily. She was started on ASA 81 mg daily as wel  - Solid Diet Level: Dental Soft Liquid Diet Level: Thin Feed     Active Hospital Problems    Diagnosis  POA    Left-sided cerebrovascular accident (CVA) [I63.9]  Unknown    Lacunar stroke of left subthalamic region [I63.50]  Yes    Essential hypertension, benign [I10]  Yes     Chronic    Leg weakness [R29.898]  Yes      Resolved Hospital Problems    Diagnosis Date Resolved POA   No resolved problems to display.       Medications during previous admission: reviewed     Reconciled Home Medications:   Current Discharge Medication List      CONTINUE these medications which have NOT CHANGED    Details   amlodipine (NORVASC) 2.5 MG tablet Take 5 mg by mouth once daily.       aspirin (ECOTRIN) 81 MG EC tablet Take 1 tablet (81 mg total) by mouth once daily.  Refills: 0      atorvastatin (LIPITOR) 40 MG tablet Take 1 tablet (40 mg total) by mouth once daily.      ibuprofen  (ADVIL,MOTRIN) 200 MG tablet Take 200 mg by mouth every 6 (six) hours as needed for Pain.             Review of patient's allergies indicates:  No Known Allergies     Past Medical History:   Diagnosis Date    Arthritis     Hypertension     Lacunar stroke of left subthalamic region 04/28/2017    Stroke      Past Surgical History:   Procedure Laterality Date    APPENDECTOMY      BREAST SURGERY      Bilateral mastectomy    COSMETIC SURGERY      EYE SURGERY      HYSTERECTOMY      MASTECTOMY       Family History   Problem Relation Age of Onset    Hypertension Mother     Heart disease Father     Diabetes Maternal Grandmother      Social History   Substance Use Topics    Smoking status: Current Every Day Smoker     Packs/day: 0.50     Years: 15.00    Smokeless tobacco: None    Alcohol use No       Functional History:   Pt lives with spouse in ss house with one step and railing on R to deck and 3 steps without railing from deck to house. Pt was (I) with ADLS including cooking, cleaning, driving and gait at community level without use of DME. Pt has a SPC but wasn't using device.  Equipment Currently Used at Home: none       Current Functional Status:  SBA/Min for bed mobility, SBA for bed mobility, CGA/Min A for transfers, UBD SBA, Max A for LBD, Min A for toileting   - Mild cognitive deficits characterized by impaired awareness/insight into deficits and safety/judgment.    Review of Systems:  Review of Systems   Eyes: Negative for blurred vision.   Respiratory: Negative for cough.    Cardiovascular: Negative for chest pain.   Gastrointestinal: Negative for nausea and vomiting.   Genitourinary: Negative for dysuria.   Musculoskeletal: Negative for myalgias.   Skin: Negative.    Neurological: Positive for weakness. Negative for dizziness, sensory change and headaches.   Psychiatric/Behavioral: Negative for depression.       OBJECTIVE:     Vital Signs (Most Recent):  Temp: 98 °F (36.7 °C) (05/02/17  1335)  Pulse: 82 (05/02/17 1335)  Resp: 16 (05/02/17 1335)  BP: 124/74 (05/02/17 1335)  SpO2: (!) 94 % (05/02/17 1335)       Physical Exam:  Physical Exam   Constitutional: She is oriented to person, place, and time. She appears well-developed and well-nourished.   HENT:   Head: Normocephalic and atraumatic.   Eyes: EOM are normal.   Neck: Normal range of motion.   Cardiovascular: Normal rate.    Pulmonary/Chest: Effort normal. No respiratory distress. She has no wheezes.   Abdominal: Soft. She exhibits no distension.   Musculoskeletal:   LUE/LLE WFL  RUE: SA 5/5, EF, EE,  5/5  RLE HF 3/5, KE 3/5, DF/PF 5/5  No calf tenderness    Neurological: She is alert and oriented to person, place, and time.   Followed all commands   Skin: Skin is warm.   Psychiatric: She has a normal mood and affect.   Vitals reviewed.      Laboratory:  CBC:   Recent Labs  Lab 04/29/17  0521   WBC 5.21   RBC 4.17   HGB 12.9   HCT 38.4      MCV 92   MCH 30.9   MCHC 33.6     BMP:   Recent Labs  Lab 04/29/17  0521   GLU 79      K 3.6      CO2 21*   BUN 12   CREATININE 0.8   CALCIUM 9.1         Diagnostic Results:  Reviewed       Evaluation:   Antonina Butler is a 71 y.o. female being admitted to inpatient rehabilitation on 5/2/2017 for stroke with impaired mobility and ADLs.     Medical Management:  Lacunar stroke of left subthalamic region with hemiparesis, mild cog deficits, dysphagia    - cont w ASA/statin   - F/U with Neuro as OP     HTN  - cont w Amlodipine daily  - monitor BP/HR closely as there can be fluctuation with initiation of therapy     Pain: Will monitor closely and adjust as necessary   - Tylenol 650mg Q6 hrs PRN       Will Monitor:   Nutrition/Swallow Status: Dental Soft Liquid Diet Level: Thin Feed    - Prealbumin - will order on admission    - Nutritionist on board   Bladder Management: continent  Bowel Management:  continent  - Colace BID and Suppository PRN   - Will monitor for regularity   Mood:  stable   Sleep: monitor; Will consider sleep aid as clinically indicated   Skin: Monitor   DVT ppx: Lovenox 40mg daily     No future appointments.      Arlene Ramirez MD  [facility]     _____________________________________________________________________  Post Admission Assessment:     Antonina Butler is a 71 y.o. female being admitted to inpatient rehabilitation on 5/2/2017 for stroke with impaired mobility and ADLs.   Patient is appropriate for inpatient rehabilitation and is expected to tolerate 3 hours of therapy a day or 15 hours of therapy a week.  Patient is expected to benefit from the following therapy services: Physical Therapy, Occupational Therapy,  Speech Therapy, as well as Recreational Therapy  Impairments include:  Impaired balance, Decreased Endurance, Impaired activity tolerance  Goals:  Supervision to Mod I with Mobility, ADLs, Transfers using LRAD   Precautions:  Fall, Aspiration   ELOS: 7-10 days   Disposition: Home with family     I have had the opportunity to examine the patient within 24 hours of admission and have reviewed the Pre-Admission Assessment and find it consistent with my examination and evaluation of the patient. I confirm that this patient is appropriate for admission and treatment in this inpatient rehabilitation hospital, needs intense interdisciplinary rehabilitation care under my direction and is expected to achieve meaningful goals within a reasonable period of time that are consistent with the planned discharge disposition.     The patient will be admitted for inpatient comprehensive interdisciplinary rehabilitation to address the impairments and medical conditions listed above while assessing equipment needs and compensatory strategies, with coordinated interdisciplinary services that will include physical therapy, occupational therapy, and close monitoring and treatment with 24-hour rehabilitative nursing. This interdisciplinary program will be  performed under the direction of a physiatrist.    Arlene Ramirez MD  [facility]

## 2017-05-02 NOTE — PROGRESS NOTES
The Sw spoke to Mary at [facility] and she states they will accept the pt today and they have the auth for rehab. The Sw spoke to Dr. Carolina and asked him to write the d/c orders. The spoke to Mary and she asked the Sw to arrange transport for the pt. The Sw called [facility] w/c van([phone])spoke to Triny and she stated they will arrive between 10:30am -110:00am. The Sw informed the pt's nurse of this info and will give her thew contact info top call report when cleared by Mary.

## 2017-05-02 NOTE — PLAN OF CARE
Problem: Physical Therapy Goal  Goal: Physical Therapy Goal  Goals to be met by: 17     Patient will increase functional independence with mobility by performin. Supine to sit with Stand-by Assistance  2. Sit to supine with Stand-by Assistance  3. Sit to stand transfer with Contact Guard Assistance  4. Bed to chair transfer with Contact Guard Assistance using Rolling Walker  5. Gait x 50 feet with Contact Guard Assistance using Rolling Walker.   6. Ascend/descend 3 stair with bilateral Handrails Minimal Assistance   7. Lower extremity exercise program x20 reps with assistance as needed  Rec: In pt rehab DME: RW, W/JOSTIN, BSC TTB   Outcome: Ongoing (interventions implemented as appropriate)  Patient progressing with mobility; RLE still weak with decreased control/coordination/balance during transfers, lagging during movement with increased time for activities and decreased safety; continue to recommend IP rehab.

## 2017-05-02 NOTE — PLAN OF CARE
Problem: Patient Care Overview  Goal: Plan of Care Review  Outcome: Ongoing (interventions implemented as appropriate)  Plan of care reviewed with patient. Patient verbalized understanding. Neuro checks performed Q4H. Patient continues to exhibit RLE drift and weakness; otherwise, neurologically intact. Patient able to safely ambulate to and from [facility] with one assist. Patient sinus mariah-NSR on telemetry monitoring, HR 50's-60's, with no ectopy noted. Bed is low and locked, bed alarm is activated, call light is within reach. Patient has been instructed to call if in need of assistance. Patient verbalized understanding.

## 2017-05-02 NOTE — PT/OT/SLP PROGRESS
Physical Therapy  Treatment    Antonina Butler   MRN: [id number]   Admitting Diagnosis: Lacunar stroke of left subthalamic region    PT Received On: 05/01/17  PT Start Time: 1629     PT Stop Time: 1656    PT Total Time (min): 27 min       Billable Minutes:  Therapeutic Activity 17 minutes and Therapeutic Exercise 10 minutes    Treatment Type: Treatment  PT/PTA: PT     PTA Visit Number: 0       General Precautions: Standard, fall, aspiration  Orthopedic Precautions: N/A   Braces:      Do you have any cultural, spiritual, Jew conflicts, given your current situation?: None    Subjective:  Communicated with nurse prior to session.  Pt agreeable to therapy                        Objective:        Functional Mobility:  Bed Mobility:   Rolling L with SBA, use of side rail and RLE lagging behind with increased time to reposition  Sup to sit with SBA with increased time to advance RLE over EOB-slow movement/control  Scooting toward EOB and laterally toward HOB in sitting with SBA  Sit to supine with Klaus for lift of RLE onto the bed    Transfers:  Bed to BS with squat pivot transfer with CGA toward L side  BSC to bed with squat pivot transfer with Klaus/modA toward R side  Sit to stand without AD with therapist in front of patient with min/modA 2/2 decreased control/weakness RLE, decreased postural contol; Klaus to maintain once standing; VCs for head/trunk erect      Balance:   Static Sit: FAIR+: Able to take MINIMAL challenges from all directions  Dynamic Sit: FAIR+: Maintains balance through MINIMAL excursions of active trunk motion  Static Stand: POOR+: Needs MINIMAL assist to maintain      Therapeutic Activities and Exercises:  Bed mob as above; transferred to bsc for urination and self cleansing; tolerated EOB sitting /standing for exercise- 8-10 reps sitting toe/heel rises, FAQ, hip flexion with VCs and tactile cues to remain in erect sitting-tendency toward posterior drift with effort to lift R knee; sit to stand  and performed small range knee squats focusing on keeping knee flexed upon extension to promote control and prevent hyperextension with min/modA.     AM-PAC 6 CLICK MOBILITY  How much help from another person does this patient currently need?   1 = Unable, Total/Dependent Assistance  2 = A lot, Maximum/Moderate Assistance  3 = A little, Minimum/Contact Guard/Supervision  4 = None, Modified [place]/Independent         AM-PAC Raw Score CMS G-Code Modifier Level of Impairment Assistance   6 % Total / Unable   7 - 9 CM 80 - 100% Maximal Assist   10 - 14 CL 60 - 80% Moderate Assist   15 - 19 CK 40 - 60% Moderate Assist   20 - 22 CJ 20 - 40% Minimal Assist   23 CI 1-20% SBA / CGA   24 CH 0% Independent/ Mod I     Patient left HOB elevated with all lines intact, call button in reach, bed alarm on and nurse notified.    Assessment:  Antonina Butler is a 71 y.o. female with a medical diagnosis of Lacunar stroke of left subthalamic region   Patient progressing with mobility; RLE still weak with decreased control/coordination/balance during transfers, lagging during movement with increased time for activities and decreased safety; continue to recommend IP rehab.  Rehab identified problem list/impairments: Rehab identified problem list/impairments: weakness, impaired endurance, gait instability, impaired functional mobilty, impaired self care skills, impaired balance, decreased lower extremity function, abnormal tone, decreased upper extremity function, decreased coordination, decreased safety awareness, impaired sensation    Rehab potential is good.    Activity tolerance: Good    Discharge recommendations: Discharge Facility/Level Of Care Needs: rehabilitation facility     Barriers to discharge: Barriers to Discharge: Decreased caregiver support    Equipment recommendations: Equipment Needed After Discharge: bath bench, bedside commode     GOALS:   Physical Therapy Goals        Problem: Physical Therapy Goal     Goal Priority Disciplines Outcome Goal Variances Interventions   Physical Therapy Goal     PT/OT, PT Ongoing (interventions implemented as appropriate)     Description:  Goals to be met by: 17     Patient will increase functional independence with mobility by performin. Supine to sit with Stand-by Assistance  2. Sit to supine with Stand-by Assistance  3. Sit to stand transfer with Contact Guard Assistance  4. Bed to chair transfer with Contact Guard Assistance using Rolling Walker  5. Gait  x 50 feet with Contact Guard Assistance using Rolling Walker.   6. Ascend/descend 3 stair with bilateral Handrails Minimal Assistance     7. Lower extremity exercise program x20 reps  with assistance as needed  Rec:  In pt rehab    DME: RW, W/C, BSC TTB                PLAN:    Patient to be seen BID (; Sat or sun)  to address the above listed problems via gait training, therapeutic activities, therapeutic exercises, neuromuscular re-education  Plan of Care expires: 17  Plan of Care reviewed with: patient         Mery NORTH Re, PT  2017

## 2017-05-02 NOTE — PLAN OF CARE
Problem: Physical Therapy Goal  Goal: Physical Therapy Goal  Goals to be met by: 17     Patient will increase functional independence with mobility by performin. Supine to sit with Stand-by Assistance  2. Sit to supine with Stand-by Assistance  3. Sit to stand transfer with Contact Guard Assistance  4. Bed to chair transfer with Contact Guard Assistance using Rolling Walker  5. Gait x 50 feet with Contact Guard Assistance using Rolling Walker.   6. Ascend/descend 3 stair with bilateral Handrails Minimal Assistance   7. Lower extremity exercise program x20 reps with assistance as needed  Rec: In pt rehab DME: RW, W/C, BSC TTB   Outcome: Ongoing (interventions implemented as appropriate)  Patient making daily improvements in mobility and control of R knee; RLE still with decreased velocity of motion during transfers and amb contributing to decreased fluidity of motion and deviation in balance; however, pt able to keep R knee from hyperextending during activities and required less assist for functional transfers/amb with RW; continues to need VCs for postural correction, technique and safety during activities; pt will bed d/c'd today to  rehab to maximize functional independence and safety.

## 2017-05-02 NOTE — PLAN OF CARE
Pt d/c to [facility]. Report called to EUGENIO Talamantes. IV removed with cath tip intact. Tele d/c'd. Transported via wheelchair transport.

## 2017-05-02 NOTE — DISCHARGE SUMMARY
"[facility] Hospital Medicine  Discharge Summary      Patient Name: Antonina Butler  MRN: [id number]  Admission Date: 4/28/2017  Hospital Length of Stay: 1 days  Discharge Date and Time:  05/02/2017 9:04 AM  Attending Physician: Anil Carolina MD   Discharging Provider: Anil Carolina MD  Primary Care Provider: Goran Stern MD      HPI:   Antonina Butler is a 71 year-old white woman with a history of hypertension and bilateral mastectomy due to fibroids.     She presented to [facility] ED with complaints of weakness to the right leg and less so to the right arm that started the day before.  She previously had some episodes where she felt weak on the right, but never this severe.  She denied any pain associated with this.  No headache, neck pain, or back pain.  No numbness.  She also had some difficulty with coordination with the right hand and dropped things.  ED workup showed normal CBC,CMP,  elevated cholestoral, negative troponin, normal TSH, and a normal chest x-ray and head CT.  She was admitted to [facility] under [facility] for stroke workup and neurology consult.    Upon arrival, she was mildly confused.  She could not remember the year and when asked why she came to the hospital, she stated, "Because someone was chasing me."  She was oriented to person and place.  She had a slight left facial droop and mild left leg and left arm weakness on exam.          Indwelling Lines/Drains at time of discharge: None    Hospital Course:   Ms. Butler underwent stroke evaluation. She was found to have a 6 mm lacunar infarction in the posterior limb of the left internal capsule. She did not have any stenosis on her MRA of the brain and neck. Her echo showed diastolic dysfunction, but normal LV function and normal LA size. Her blood pressure was controlled with amlodipine 5 mg daily. Her LDL was found to be 173 and she was started on atorvastatin 40 mg " daily. She was started on ASA 81 mg daily as well. She was evaluated by [facility] and those recommendations were followed. PT/OT/SLP evaluated her and recommended inpatient rehab. She was accepted to [facility] on 5/01/17 but could not be transferred until 5/02/17. She was discharged by a hospitalist who took over her care on 5/02/17 and made no other changes.      Consults:   Consults         Status Ordering Provider     Inpatient consult to Neurology  Once     Provider:  (Not yet assigned)    Completed CARLOS BOGGS.     Inpatient consult to Physical Medicine Rehab  Once     Provider:  (Not yet assigned)    Completed JOHN BOGGSEMIAH H.     Inpatient consult to Physical Medicine Rehab  Once     Provider:  Krzysztof Rowell MD    Acknowledged CARLOS BOGGS.     Inpatient consult to Social Work  Once     Provider:  (Not yet assigned)    Completed JOHN BOGGSEMIAH H.     IP consult to case management/social work  Once     Provider:  (Not yet assigned)    Completed JOHN BOGGSEMIAANN JUAREZ.     IP consult to dietary  Once     Provider:  (Not yet assigned)    Completed CARLOS BOGGS.          Significant Diagnostic Studies:   MRI Brain W WO Contrast 4/29/17: There is a 6 millimeter infarct involving the left posterior limb of the internal capsule. There is involutional change and microvascular small vessel ischemic change. Major flow voids are present for expected. There are cataract implants. Sinuses are clear. Skull and skull base are nothing unusual. No blood products are detected. Pituitary and craniocervical junction are nothing unusual. Postcontrast images reveal no abnormal enhancement.  MRA Brain 4/29/17: Skull base vessels are normal. ACAs MCAs and PCAs are patent. No aneurysm, stenosis, or vascular malformation seen.  MRA Neck with contrast 4/29/17: here is a bovine arch. Arch and great vessels are otherwise normal. CCAs ICAs and vertebral arteries are patent. No stenosis, or  dissection seen.  2D echo with color flow doppler 4/29/17:     1 - Normal left ventricular systolic function (EF 60-65%).     2 - Impaired LV relaxation, increased LVEDP.     3 - No wall motion abnormalities.     4 - Concentric remodeling.     5 - Normal right ventricular systolic function .     6 - The estimated PA systolic pressure is 28 mmHg.     Final Active Diagnoses:    Diagnosis Date Noted POA    PRINCIPAL PROBLEM:  Lacunar stroke of left subthalamic region [I63.50] 04/29/2017 Yes    Leg weakness [R29.898] 04/28/2017 Yes    Essential hypertension, benign [I10] 04/28/2017 Yes     Chronic      Problems Resolved During this Admission:    Diagnosis Date Noted Date Resolved POA    TIA (transient ischemic attack) [G45.9] 04/29/2017 04/30/2017 Yes        Discharged Condition: good    Disposition: Rehab Facility    Follow Up: [facility]    Patient Instructions:   No discharge procedures on file.  Medications:  Transfer Medications (for Discharge Readmit only):   Current Facility-Administered Medications   Medication Dose Route Frequency Provider Last Rate Last Dose    acetaminophen tablet 650 mg  650 mg Oral Q6H PRN Eleazar Cook MD        amlodipine tablet 5 mg  5 mg Oral Daily Eleazar Cook MD   5 mg at 05/01/17 0837    aspirin EC tablet 81 mg  81 mg Oral Daily Eleazar Cook MD   81 mg at 05/01/17 0837    atorvastatin tablet 40 mg  40 mg Oral Daily Eleazar Cook MD   40 mg at 05/01/17 0836    enoxaparin injection 40 mg  40 mg Subcutaneous Daily Eleazar Cook MD   40 mg at 05/01/17 1740    ondansetron disintegrating tablet 8 mg  8 mg Oral Q8H PRN Eleazar Cook MD        promethazine (PHENERGAN) 6.25 mg in dextrose 5 % 50 mL IVPB  6.25 mg Intravenous Q6H PRN Eleazar Cook MD        sodium chloride 0.9% bolus 500 mL  500 mL Intravenous Continuous PRN Eleazar Cook MD        sodium chloride 0.9% flush 3 mL  3 mL Intravenous Q8H Eleazar Cook  MD   3 mL at 05/02/17 0556     Time spent on the discharge of patient: 15 minutes    Anil Carolina MD  Department of Hospital Medicine  [facility]

## 2017-05-02 NOTE — PT/OT/SLP DISCHARGE
Occupational Therapy Discharge Summary    Antonina Butler  MRN: [id number]   Lacunar stroke of left subthalamic region   Patient Discharged from acute Occupational Therapy on 5/2/17.  Please refer to prior OT note dated on 5/1/17 for functional status.     Assessment:   Patient appropriate for care in another setting.  GOALS:   Occupational Therapy Goals     Not on file      Multidisciplinary Problems (Resolved)        Problem: Occupational Therapy Goal    Goal Priority Disciplines Outcome Interventions   Occupational Therapy Goal   (Resolved)     OT, PT/OT Outcome(s) achieved    Description:  Goals to be met by: 5/29/2017     Patient will increase functional independence with ADLs by performing:    UE Dressing with Modified [place].  LE Dressing with Modified [place]irt.  Grooming while standing at sink with Modified [place].  Toileting from bedside commode with Modified [place]irt for hygiene and clothing management.   Bathing from  shower chair/bench with Supervision.  Stand pivot transfers with Modified [place].  Toilet transfer to bedside commode with Modified [place]irt.    Pt. Has SP cane not in use.  Needs TTB & BSC.  To benefit from OT in In-patient rehab.              Reasons for Discontinuation of Therapy Services  Transfer to alternate level of care.      Plan:  Patient Discharged to: Inpatient Rehab.    Hoa Schulz, OT  5/2/2017

## 2017-05-02 NOTE — PROGRESS NOTES
TN rounded, plan unchanged as patient is set for arrival to [facility] today.    Dylon Olivas RN  Transitional Navigator/Case Management  [phone]

## 2017-05-02 NOTE — PLAN OF CARE
05/02/17 0916   Final Note   Assessment Type Final Discharge Note   Discharge Disposition Rehab   Right Care Referral Info   Post Acute Recommendation IRF   Referral Type IP rehab   Facility Name [facility]

## 2017-05-02 NOTE — PLAN OF CARE
Problem: Patient Care Overview  Goal: Plan of Care Review  Outcome: Ongoing (interventions implemented as appropriate)  Pt on RA with sats of 94. Will continue to monitor.

## 2017-05-02 NOTE — PROGRESS NOTES
Admission Note:  Mrs Butler was escorted onto unit via w/c per [facility]. VS, aasessment, and admission information collected. Pt received verbal teaching no [facility] Fall Protocols. She was shown how to use the call button to signal staff for assistance. Return demonstration. Safety measures maintained. Call light is within rezch, and bed is in lowest position with wheels locked. Will continue with plan of care.

## 2017-05-02 NOTE — PT/OT/SLP PROGRESS
Physical Therapy  Treatment    Antonina Butler   MRN: [id number]   Admitting Diagnosis: Lacunar stroke of left subthalamic region    PT Received On: 17  PT Start Time: 929     PT Stop Time:     PT Total Time (min): 40 min       Billable Minutes:  Gait [id number] minutes, Therapeutic Activity 10 minutes and Therapeutic Exercise 18 minutes    Treatment Type: Treatment  PT/PTA: PT     PTA Visit Number: 0       General Precautions: Standard, fall  Orthopedic Precautions: N/A   Braces: N/A    Do you have any cultural, spiritual, Sikhism conflicts, given your current situation?: None    Subjective:  Communicated with nurse prior to session.  Pt feels that she is getting better.    Pain Ratin/10              Pain Rating Post-Intervention: 0/10    Objective:   Patient found with: telemetry    Functional Mobility: Increased time to complete 2/2 minimal lag R with movement/weakness, decreased fluidity of movement, decreased balance  Bed Mobility:   Scooting/Bridging: Stand by Assistance  Supine to Sit: Stand by Assistance  Sit to Supine: Stand by Assistance    Transfers:  Sit <> Stand Assistance: Contact Guard Assistance, Minimum Assistance (Klaus without AD with therapist in front; CGA/Klaus with RW; slow ascent and VCs for hand placement; min decreased fluidity of movement)  Sit <> Stand Assistive Device: No Assistive Device, Rolling Walker    Gait:   Gait Distance: 5ft foward/5 ft backward, 4ft laterally L toward  HOB with RW  Assistance 1: Minimum assistance, Contact Guard Assistance  Gait Assistive Device: Rolling walker  Gait Pattern: 3-point gait  Gait Deviation(s): decreased duran, increased time in double stance, decreased velocity of limb motion, decreased step length, decreased stride length, foot flat, decreased swing-to-stance ratio (head down, minimal foward trunk; stance R keeping R knee minimally flexed to avoid hyperextension)    Balance:   Static Sit: GOOD: Takes MODERATE challenges from all  directions  Dynamic Sit: GOOD-: Maintains balance through MODERATE excursions of active trunk movement,     Static Stand: POOR+: Needs MINIMAL assist to maintain  Dynamic stand: POOR: N/A     Therapeutic Activities and Exercises:  Bed mob as above- pt able to advance RLE over EOB with decreased control/fluidity of movement, coming to sitting EOB; seated LE ex x 8-10 reps heel/toe rises (slow on R, especially toe rises), alternate tapping, FAQ x 8 reps with 3 sec hold, hip fl with increased difficulty 2/2 weakness-trying to compensate with posterior trunk lean; VCs/TCs for ant pelvic tilt, shld retraction and head lift throughout; continued with AI and RS in static sitting, D1D2 fl/ext PNF R incorporating head lift and trunk ext with the movements x 5 each; with B hands clasped, 5 reps reaching forward, rotation L and forward, rotation R and forward; sit to stand as described above (2 trials), mini squats x 10 reps keeping R knee minimally flexed to prevent hyperextension; amb as described above.    AM-PAC 6 CLICK MOBILITY  How much help from another person does this patient currently need?   1 = Unable, Total/Dependent Assistance  2 = A lot, Maximum/Moderate Assistance  3 = A little, Minimum/Contact Guard/Supervision  4 = None, Modified [place]/Independent    Turning over in bed (including adjusting bedclothes, sheets and blankets)?: 3  Sitting down on and standing up from a chair with arms (e.g., wheelchair, bedside commode, etc.): 3  Moving from lying on back to sitting on the side of the bed?: 3  Moving to and from a bed to a chair (including a wheelchair)?: 3  Need to walk in hospital room?: 3  Climbing 3-5 steps with a railing?: 2  Total Score: 17    AM-PAC Raw Score CMS G-Code Modifier Level of Impairment Assistance   6 % Total / Unable   7 - 9 CM 80 - 100% Maximal Assist   10 - 14 CL 60 - 80% Moderate Assist   15 - 19 CK 40 - 60% Moderate Assist   20 - 22 CJ 20 - 40% Minimal Assist   23 CI 1-20%  SBA / CGA   24 CH 0% Independent/ Mod I     Patient left HOB elevated with all lines intact, call button in reach, bed alarm on and nurse notified.    Assessment:  Antonina Butler is a 71 y.o. female with a medical diagnosis of Lacunar stroke of left subthalamic region   Patient making daily improvements in mobility and control of R knee; RLE still with decreased velocity of motion during transfers and amb contributing to decreased fluidity of motion and deviation in balance; however, pt able to keep R knee from hyperextending during activities and required less assist for functional transfers/amb with RW; continues to need VCs for postural correction, technique and safety during activities; pt will bed d/c'd today to IP rehab to maximize functional independence and safety.  Rehab identified problem list/impairments: Rehab identified problem list/impairments: weakness, impaired endurance, impaired sensation, impaired self care skills, impaired functional mobilty, gait instability, impaired balance, decreased lower extremity function, decreased upper extremity function, decreased coordination, decreased safety awareness, abnormal tone    Rehab potential is good.    Activity tolerance: Good    Discharge recommendations: Discharge Facility/Level Of Care Needs: rehabilitation facility     Barriers to discharge: Barriers to Discharge: Decreased caregiver support    Equipment recommendations: Equipment Needed After Discharge: bath bench, bedside commode (appropriate AD TBD; using RW at this time)     GOALS:   Physical Therapy Goals        Problem: Physical Therapy Goal    Goal Priority Disciplines Outcome Goal Variances Interventions   Physical Therapy Goal     PT/OT, PT Ongoing (interventions implemented as appropriate)     Description:  Goals to be met by: 17     Patient will increase functional independence with mobility by performin. Supine to sit with Stand-by Assistance  2. Sit to supine with Stand-by  Assistance  3. Sit to stand transfer with Contact Guard Assistance  4. Bed to chair transfer with Contact Guard Assistance using Rolling Walker  5. Gait  x 50 feet with Contact Guard Assistance using Rolling Walker.   6. Ascend/descend 3 stair with bilateral Handrails Minimal Assistance     7. Lower extremity exercise program x20 reps  with assistance as needed  Rec:  In pt rehab    DME: RW, W/C, BSC TTB                PLAN:    Patient to be seen BID (weekdays; Sat or sun)  to address the above listed problems via  (d/c acute PT services -pt being transferred to IP rehab')  Plan of Care expires: 05/29/17  Plan of Care reviewed with: patient    PT G-Codes  Functional Assessment Tool Used: [facility]  Score: 17  Functional Limitation: Mobility: Walking and moving around  Mobility: Walking and Moving Around Goal Status (): CI  Mobility: Walking and Moving Around Discharge Status (): MUKESH Grimaldo, PT  05/02/2017

## 2017-05-03 LAB
ALBUMIN SERPL BCP-MCNC: 3.9 G/DL
ALP SERPL-CCNC: 48 U/L
ALT SERPL W/O P-5'-P-CCNC: 15 U/L
ANION GAP SERPL CALC-SCNC: 6 MMOL/L
AST SERPL-CCNC: 13 U/L
BASOPHILS # BLD AUTO: 0.02 K/UL
BASOPHILS NFR BLD: 0.4 %
BILIRUB SERPL-MCNC: 1 MG/DL
BUN SERPL-MCNC: 17 MG/DL
CALCIUM SERPL-MCNC: 9.6 MG/DL
CHLORIDE SERPL-SCNC: 103 MMOL/L
CO2 SERPL-SCNC: 31 MMOL/L
CREAT SERPL-MCNC: 0.9 MG/DL
DIFFERENTIAL METHOD: NORMAL
EOSINOPHIL # BLD AUTO: 0.2 K/UL
EOSINOPHIL NFR BLD: 3.4 %
ERYTHROCYTE [DISTWIDTH] IN BLOOD BY AUTOMATED COUNT: 13.2 %
EST. GFR  (AFRICAN AMERICAN): >60 ML/MIN/1.73 M^2
EST. GFR  (NON AFRICAN AMERICAN): >60 ML/MIN/1.73 M^2
GLUCOSE SERPL-MCNC: 88 MG/DL
HCT VFR BLD AUTO: 40.7 %
HGB BLD-MCNC: 13.1 G/DL
LYMPHOCYTES # BLD AUTO: 1.8 K/UL
LYMPHOCYTES NFR BLD: 38 %
MAGNESIUM SERPL-MCNC: 2.3 MG/DL
MCH RBC QN AUTO: 30.4 PG
MCHC RBC AUTO-ENTMCNC: 32.2 %
MCV RBC AUTO: 94 FL
MONOCYTES # BLD AUTO: 0.4 K/UL
MONOCYTES NFR BLD: 8.9 %
NEUTROPHILS # BLD AUTO: 2.3 K/UL
NEUTROPHILS NFR BLD: 49.3 %
PLATELET # BLD AUTO: 187 K/UL
PMV BLD AUTO: 11.2 FL
POTASSIUM SERPL-SCNC: 4 MMOL/L
PREALB SERPL-MCNC: 22 MG/DL
PROT SERPL-MCNC: 6.2 G/DL
RBC # BLD AUTO: 4.31 M/UL
SODIUM SERPL-SCNC: 140 MMOL/L
WBC # BLD AUTO: 4.74 K/UL

## 2017-05-03 PROCEDURE — 97530 THERAPEUTIC ACTIVITIES: CPT

## 2017-05-03 PROCEDURE — 99232 SBSQ HOSP IP/OBS MODERATE 35: CPT | Mod: ,,, | Performed by: PHYSICAL MEDICINE & REHABILITATION

## 2017-05-03 PROCEDURE — 84134 ASSAY OF PREALBUMIN: CPT

## 2017-05-03 PROCEDURE — 85025 COMPLETE CBC W/AUTO DIFF WBC: CPT

## 2017-05-03 PROCEDURE — 25000003 PHARM REV CODE 250: Performed by: PHYSICAL MEDICINE & REHABILITATION

## 2017-05-03 PROCEDURE — 83735 ASSAY OF MAGNESIUM: CPT

## 2017-05-03 PROCEDURE — 63600175 PHARM REV CODE 636 W HCPCS: Performed by: HOSPITALIST

## 2017-05-03 PROCEDURE — 12800000 HC REHAB SEMI-PRIVATE ROOM

## 2017-05-03 PROCEDURE — 80053 COMPREHEN METABOLIC PANEL: CPT

## 2017-05-03 PROCEDURE — 97535 SELF CARE MNGMENT TRAINING: CPT

## 2017-05-03 PROCEDURE — 97167 OT EVAL HIGH COMPLEX 60 MIN: CPT

## 2017-05-03 PROCEDURE — 92523 SPEECH SOUND LANG COMPREHEN: CPT

## 2017-05-03 PROCEDURE — 97110 THERAPEUTIC EXERCISES: CPT

## 2017-05-03 PROCEDURE — 25000003 PHARM REV CODE 250: Performed by: HOSPITALIST

## 2017-05-03 PROCEDURE — 97163 PT EVAL HIGH COMPLEX 45 MIN: CPT

## 2017-05-03 PROCEDURE — 36415 COLL VENOUS BLD VENIPUNCTURE: CPT

## 2017-05-03 RX ADMIN — DOCUSATE SODIUM 100 MG: 100 CAPSULE, LIQUID FILLED ORAL at 07:05

## 2017-05-03 RX ADMIN — ATORVASTATIN CALCIUM 40 MG: 20 TABLET, FILM COATED ORAL at 07:05

## 2017-05-03 RX ADMIN — ENOXAPARIN SODIUM 40 MG: 100 INJECTION SUBCUTANEOUS at 05:05

## 2017-05-03 RX ADMIN — ASPIRIN 81 MG: 81 TABLET, COATED ORAL at 07:05

## 2017-05-03 RX ADMIN — AMLODIPINE BESYLATE 5 MG: 5 TABLET ORAL at 07:05

## 2017-05-03 NOTE — PROGRESS NOTES
"Occupational Therapy   Evaluation + Treatment     Antonina Butler   MRN: [id number]    05/03/17 0730   OT Time Calculation   OT Start Time 0730   OT Stop Time 0900   OT Total Time (min) 90 min   General   OT Date of Treatment 05/03/17   Chart Reviewed Yes   Diagnosis lacunar infarction in the posterior limb of the left internal capsule   Additional Pertinent History Past Medical History:   Diagnosis Date    Arthritis     Hypertension     Lacunar stroke of left subthalamic region 04/28/2017    Stroke      Past Surgical History:   Procedure Laterality Date    APPENDECTOMY      BREAST SURGERY      Bilateral mastectomy    COSMETIC SURGERY      EYE SURGERY      HYSTERECTOMY      MASTECTOMY        Date Referred to OT 05/02/17   Referring Physician Dr. Ramirez   Family/Caregiver Present No   Patient Found (position) Supine in bed   Precautions   General Precautions fall   Visual/Auditory (reading glasses)   IADL History   Occupation Retired   Leisure and Hobbies thrift store shopping   Living Environment   Lives With spouse  (3 adult grandchildren\)   Living Arrangements house   Home Accessibility stairs to enter home  (tub-shower combo)   Home Layout Able to live on 1st floor   Number of Stairs to Enter Home 3   Transportation Available family or friend will provide   Living Environment Comment Pt lives in [place], [place] with  and 3 adults grandchildren (21,20,16 y/o). Prior to admit pt was completely (I) with self care and driving.    Equipment Currently Used at Home cane, straight  (pt was not using)   Subjective   Patient states "I lose control of my knee"   Pain/Comfort   Pain Rating 4/10   Location - Side Right   Location hip   Pain Comment pt reports athritic pain in R hip when rolling to L side   Cognitive Status   Level of Consciousness (AVPU) alert   Arousal Level opens eyes spontaneously   Orientation oriented x 4   Able to Follow Commands (Communication) mild impairment   Speech follows " commands;delayed responses;clear/fluent   Mood/Behavior calm;cooperative   Range of Motion (ROM)   Range of Motion Examination deficits as listed below;LUE ROM was WNL  (R shoulder flexion 140*, R elbow flexion WFL but slower mvmt as compared to L)   Manual Muscle Testing (MMT)   Manual Muscle Testing Results other (see comments)  (L shoulder flexion 4/5, R elbow flexion 4/5, R  intact)  (R shoulder flexion 5/5, R elbow flexion 5/5, L  intact)   Tone   Right UE  hypotonic   Left UE normal   Observation   Appearance WN female, sitting up in bed   Posture Rounded shoulders;Posterior pelvic tilt   Skin dry;warm;intact   Bed Mobility   Rolling/Turning to Left Moderate assistance   Rolling/Turning Right Stand by assistance   Supine to Sit Minimum Assistance   Supine to Sit Comments assist for positioning RLE to EOB   Sit to Supine Minimum Assistance   Sit to Supine Comments assist RLE lifting against    Transfers   Transfer yes   Sit to Stand   Sit <> Stand Assistance Minimum Assistance   Sit <> Stand Assistive Device No Assistive Device   Trials/Comments pt losing control of R knee, buckling   Stand to Sit   Assistance Minimum Assistance   Assistive Device No Assistive Device   Trials/Comments assist for controlled descent to prevent buckling   Bed to Chair   Bed <> Chair Technique Stand Pivot   Bed <> Chair Transfer Assistance Maximum Assistance;Minimum Assistance   Bed <> Chair Assistive Device No Assistive Device   Trials/Comments assist to lift and control descent into chair when standing, Min A for squat pivot transfer   Shower Transfer   Shower Transfer Technique Stand Pivot   Shower Transfer Assistance Moderate Assistance   Shower Transfer Assistive Device No Assistive Device   Trials/Comments assist for lift out of chair, pt controlled descent onto bench   Toilet Transfer   Toilet Transfer Technique Stand Pivot   Toilet Transfer Assistance Moderate Assistance   Toilet Transfer Assistive Device No  "Assistive Device   Feeding   Feeding Level of Assistance Modified independent   Feeding Where Assessed Wheelchair   Feeding Comments dentures   Grooming   Grooming Level of Assistance Supervision   Grooming Where Assessed Wheelchair;Sitting sinkside   Bathing   Bathing Level of Assistance Moderate assistance   Bathing Where Assessed tub bench;Shower   Bathing Comments assist for thoroughness of R foot, yanet area, and steadying while in standing to complete yanet hygiene   UE Dressing   UE Dressing Level of Assistance Minimum assistance   UE Dressing Where Assessed Wheelchair   UE Dressing Comments assist to manage RUE through sleeve, RUE "stuck" in sleeve and pt requiring assist to manage    LE Dressing   LE Dressing Yes   LE Dressing  Level of Assistance Moderate assistance   LE Dressing Level of Assistance Moderate assistance  (A to thread R foot in pants with Min A steadying in standing)   Sock Level of Assistance Moderate assistance  (assist to thread R foot)   Adult Briefs Level of Assistance Minimum assistance  (steadying assist only)   LE Dressing Where Assessed Wheelchair   LE Dressing Comments steadying assist to prevent knee buckling   Toileting   Toileting Level of Assistance Minimum assistance   Toileting Where Assessed Bedside commode   Toileting Comments steadying assist only, pt manages clothing and performs yanet care   Treatment   Treatment   Cognition:    Communication   Comprehension · Understands complex/abstract conversation/directions with extra time, assistance device(glasses, if visual mode or both; hearing aide if auditory mode or both) (6)   Mode B   Expression - Expresses complex / abstract ideas with extra time, assistive devic (augmentative communication device    (6)     Mode B   Social Cognition   Social Interaction - Interacts appropriately with others with medication or extra time(anti-anxiety, antidepressant)  (6)     Problem Solving - Solves complex problems with cues  (5)  - Solves " basic problems 90% of the time. Ex: I need to go to bed-Im tired  (5)   Memory - Recognizes, recalls, or executes 3 steps of 3 step request 90% of time (cueing, reminders<10%, loses track of time)  (5)        Activity Tolerance   Activity Tolerance Patient tolerated treatment well   Medical Staff Made Aware NSG   After Treatment   Patient Position After Treatment Seated in wheelchair   Patient after treatment left call button in reach   Assessment   Prognosis Good   Problem List Decreased Self Care skills;Decreased upper extremity strength;Decreased safe judgment during ADL;Decreased cognition;Decreased endurance;Decreased sensation;Decreased fine motor control;Decreased functional mobility;Decreased gross motor control;Decreased IADLs;Decreased Function of right upper extremity;Decreased trunk control for functional activities;Decreased upper extremity range of motion   Assessment Pt presents with impaired coordination of SHAILESH HELMS LE weakness, mild cognitive deficits, and overall decreased self care skills due to recent stroke. Pt is motivated to therapy and eager to regain functional independence. Pt is slightly impulsive at times during transfers and demo consistent knee buckling with transfers, is at high risk for falls.    Short Term Goals   Additional Documentation yes   Time For Goal Achievement 7 days   Pt Will Perform Supine To Sit Supervision;New goal   Supine to Sit - Met/Not Met Not Met   Pt Will Perform Sit to Supine Supervision;New goal   Supine to Sit - Met/Not Met Not Met   Pt Will Transfer Bed/Chair With minimal assist;New goal;With RW   Transfer Bed/Chair - Met/Not Met Not Met   Pt Will Transfer To Bedside Commode With minimal assist;With RW;New goal   Transfer Bedside Commode -Met/Not Met Not Met   Pt Will Transfer To Shower With minimal assist;New goal;With RW   Transfer to Shower-Met/Not Met Not Met   Pt Will Perform Bathing With contact guard assistance;New goal;On shower seat/tub bench    Bathing - Met/Not Met Not Met   Pt Will Perform UE Dressing With supervision;New goal   UE Dressing - Met/Not Met Not Met   Pt Will Perform LE Dressing New goal;With minimal assistance   LE Dressing - Met/Not Met Not Met   Long Term Goals   Additional Documentation (12 days)   Pt Will Perform Supine To Sit Independently;New goal   Supine to Sit - Met/Not Met Not Met   Pt Will Perform Sit to Supine Independently;New goal   Supine to Sit - Met/Not Met Not Met   Pt Will Transfer To Bedside Commode With stand by assist;New goal;With RW   Transfer Bedside Commode -Met/Not Met Not Met   Pt Will Transfer To Shower With stand by assist;New goal   Transfer to Shower-Met/Not Met Not Met   Pt Will Perform Eating With modified independence;New goal   Eating - Met/Not Met Not Met   Pt Will Perform Grooming Independently;New goal   Grooming - Met/Not Met Not Met   Pt Will Perform Bathing With supervision;New goal;On shower seat/tub bench   Bathing - Met/Not Met Not Met   Pt Will Perform UE Dressing New goal;With modified independence   UE Dressing - Met/Not Met Not Met   Pt Will Perform LE Dressing New goal;With supervision   LE Dressing - Met/Not Met Not Met   Pt Will Perform Toileting With supervision;New goal   Toileting-Met/Not Met Not Met   Discharge Recommendations   Equipment Needed After Discharge bath bench;3-in-1 commode;walker, rolling   Discharge Facility/Level Of Care Needs outpatient OT   Plan   Plan Self care retraining;Functional transfer training;UE thereex;Equipment Training;Family training;Safety training;Fine motor training;Functional endurance training;Patient education;Postural control;AROM;Neuromuscular Re-education;Functional Standing Activities;Continue with current plan   Therapy Frequency 2 times/day;Monday-Friday;Saturday or Sunday   Occupational Therapy Follow-up   OT Follow-up? Yes   Treatment/Billable Minutes   Evaluation 60   Self Care/Home Management 30   Total Time 90     BRI Gomez    5/3/2017

## 2017-05-03 NOTE — PROGRESS NOTES
"Physical Therapy   Evaluation/Treatment    Antonina Butler   MRN: [id number]                  05/03/17 1031   PT Time Calculation   PT Start Time 1031   PT Stop Time 1201   PT Total Time (min) 90 min   Treatment   Treatment Type Evaluation;other (see comments)  (Treatment)   General   PT Received On 05/03/17   Chart Reviewed Yes   Onset of Illness/Injury or Date of Surgery 04/28/17   Diagnosis lacunar infarct in the posterior limb of L internal capsule   Additional Pertinent History deaf in L ear   Date Referred to PT 05/03/17   Referring Physician Dr. Ramirez   Family/Caregiver Present No   Patient Found (position) Seated in wheelchair;Other (comment)  (in room with head resting on bedside table)   Pt found with (posey belt in place)   Precautions   General Precautions fall;hearing impaired   Visual/Auditory Hearing impaired;Other (see comments)  (deaf in L ear)   Previous Level of Function   Ambulation Skills independent   Transfer Skills independent   ADL Skills independent   Work/Leisure Activity independent  (pt retired from working at [employer])   Living Environment   Lives With spouse;grandchild(bert)  (and 3 grandchildren( 2 in college and 1 in high school))   Living Arrangements house   Home Accessibility stairs to enter home;other (see comments)  (tub-shower combo)   Home Layout Able to live on 1st floor   Number of Stairs to Enter Home 3   Stair Railings at Home none   Transportation Available family or friend will provide   Living Environment Comment Pt lives in [place] with her  and 3 grandchildren.   was working but has been laid off since October.  Pt's role is taking care of home( cooking cleaning, etc.).  Pt reports grandchildren are mostly self-sufficient.  Pt was an active  PTA.   Equipment Currently Used at Home cane, straight;other (see comments)  (pt was not using PTA)   Subjective   Patient states " I'm just tired."   Pain/Comfort   Pain Rating no pain   Pain Rating Post-Intervention " no pain   Cognitive Status   Level of Consciousness (AVPU) alert   Arousal Level opens eyes spontaneously   Orientation oriented x 4   Able to Follow Commands (Communication) mild impairment   Speech follows commands;delayed responses;clear/fluent   Mood/Behavior calm;cooperative   Sensory Examination   Additional Documentation yes   Light Touch   LUE w/i normal limits   RUE w/i normal limits   LLE w/i normal limits   RLE w/i normal limits   Proprioception   LUE w/i normal limits   RUE w/i normal limits   LLE w/i normal limits   RLE w/i normal limits   Range of Motion (ROM)   Range of Motion Examination no ROM deficits were identified   Manual Muscle Testing (MMT)   Manual Muscle Testing Results other (see comments)  (see comments section below)   Tone   Right UE  hypotonic  (very mild deficit)   Left UE normal   Right LE hypotonic  (very mild)   Left LE normal   Observation   Appearance thin female seated in w/c with head resting on bedside table   Posture Normal for age   Skin intact   Bed Mobility   Bed Mobility yes   Rolling/Turning to Left Supervision;Other (see comments)  (x 2 trials for technique)   Rolling/Turning Right Modified independent;Other (see comments)  (x 1 trial)   Supine to Sit Supervision;Other (see comments)  (x 1 on mat)   Supine to Sit Comments x 1 on mat   Sit to Supine Supervision;Other (see comments)  (x 1 on mat)   Transfers   Transfer yes   Sit to Stand   Sit <> Stand Assistance Contact Guard Assistance   Sit <> Stand Assistive Device No Assistive Device   Trials/Comments multiple trials with cues for hand placement and technique   Stand to Sit   Assistance Contact Guard Assistance   Assistive Device No Assistive Device   Trials/Comments multiple trials with cues for hand placement and technique   Chair to Mat   Chair<> Mat Technique Stand Pivot   Chair<>Mat Assistance Contact Guard Assistance   Chair <> Mat Assistive Device No Assistive Device   Trials/Comments 1 trial~ cues for hand  placement   Mat to Chair   Technique Squat Pivot   Assistance Contact Guard Assistance   Assistive Device No Assistive Device   Trials/Comments 1 trial~ cues for hand placement   Tub Bench Transfer   Technique Stand Pivot   Assistance Moderate Assistance   Assistive Device No Assistive Device   Trials/Comments 1 trial~ PTA, pt stepped into tub and stood to shower.  PT assisted pt to step into and out of tub~ mod A for PT to stabilize R knee in weight bearing and to lift R LE over tub wall.   Toilet Transfer   Toilet Transfer Technique Stand Pivot   Toilet Transfer Assistance Contact Guard Assistance   Toilet Transfer Assistive Device No Assistive Device   Trials/Comments 1 trial~ cues for technique and hand placement   Car Transfer   Car Transfer Technique Stand Pivot   Car Transfer Assistance Contact Guard Assistance;Minimum Assistance   Car Transfer Assistive Device No Assistive Device   Trials/Comments 1 trial~ cues needed for hand placement and technique   Wheelchair Activities   Propulsion Yes   Propulsion Type 1 Manual   Level 1 Level tile   Method 1 Right upper extremity;Left upper extremity   Level of Assistance 1 Supervision   Description/ Details 1 pt propels w/c x 46 feet with 2 turns   Gait   Gait Yes   Weight Bearing Status full   Gait 1   Surface 1 Level tile   Gait Assistive Device No device   Assistance 1 Moderate assistance   Gait Distance 53 feet   Gait Pattern swing-to gait   Gait Deviation(s) decreased duran;increased time in double stance;decreased velocity of limb motion;decreased step length;decreased stride length;decreased swing-to-stance ratio;decreased toe-to-floor clearance;decreased weight-shifting ability;forward lean;excessive knee flexion;knee hyperextension;other (see comments)  (R knee alternates between flex/hyperextension in stance)   Impairments Contributing to Gait Deviations impaired balance;impaired motor control;abnormal muscle tone;impaired postural control;decreased  strength   Stairs   Stairs Yes   Stairs/Curb Step   Rails 1 Bilateral   Device 1 No device   Assistance 1 Minimum assistance   Comment/# Steps 1 pt manages 4 six inch steps with step-to pattern  (cues for proper LE sequence)   Stairs/ Curb Step  2   Rails 2 None (Comment)   Device 2 No device   Assistance 2 Moderate assistance   Comment/# Steps 2 pt ascends and descends 4 inch curb x 2 trials  (cues for proper LE sequence)   Endurance Deficit   Endurance Deficit Yes   Endurance Deficit Description pt not at PLOF   Balance   Balance Yes   Static Sitting Balance   Static Sitting-Balance Support Right upper extremity supported;Left upper extremity supported;Feet supported   Static Sitting-Level of Assistance Supervision   Static Sitting-Comment/# of Minutes pt sits edge of mat briefly while PT assesses posture   Static Standing Balance   Static Standing-Balance Support No upper extremity supported   Static Standing-Level of Assistance Contact guard   Static Standing-Comment/# of Minutes pt stands briefly before attempting ambulation   Activity Tolerance   Activity Tolerance Patient tolerated treatment well   After Treatment   Patient Position After Treatment Seated in wheelchair;Other (comment)  (in room for lunch)   Patient after treatment left call button in reach   Treatment   Treatment Evaluation; Treatment.  Treatment consists of education regarding stroke and pt's current deficits; pt completes all appropriate QI items( as listed in eval and also to include picking up object from floor with min/mod A).  Ambulation on unlevel surfaces not attempted due to R knee instability.  Pt performs 7 minutes of alternating B/L leg press with blue/green theratubing for improved co-contraction to knee musculature.   Assessment   Prognosis Good   Problem List Decreased strength;Decreased endurance;Impaired balance;Decreased mobility;Impaired hearing;Decreased cognition;Decreased safety awareness   Assessment Pt is a pleasant  "female who comes to rehab following recent L internal capsule infarct.  She presents with mild hypotonia/ hemiparesis to R UE/LE, decreased standing balance and limited ability to co-contract musculature around R knee, which places pt at risk for falls.  Pt also seems to be having mild cognitive/memory difficulty at present, some of which may have been premorbid( mainly memory).  Expect pt to progress well here on rehab and should only need light assistance at discharge.   Level of Motivation/Participation good   Barriers to Discharge None   Barriers to Discharge Comments  is not currently working and should be able to provide assistance as needed by pt    Short Term Goals   Additional Documentation yes   Time For Goal Achievement 5 days;6 days   Pt Will Perform Supine To Sit Modified Independently   Pt Will Perform Sit to Supine Modified Independently   Pt Will Transfer Sit to Stand With RW;With stand by assist   Pt Will Transfer Bed/Chair Stand Pivot;With RW;With stand by assist   Pt Will Ambulate 151-200 feet;With RW;With minimal assist  (for R knee stability)   Pt Will Go Up / Down Stairs 6-10 stairs;With 2 Rails;With contact guard assist;With minimal assist   Pt Will Go Up / Down Curb Step 4" curb step;With RW;With minimal assist  (for R knee control)   Pt Will Stand 3-5 min;With RW;With contact guard assist  (while pt performs UE task( e.g., BITS))   Pt Will Tolerate Exercise 11-15 reps;With active assist   Pt Will Propel Wheelchair  feet;Supervision;With (B) UE   Other Goal stand pivot transfers w/c<> 3 in 1 commode with RW and SBA   Other Goal 2 pt to step into and out of tub with grab bar and min A   Other Goal 3 pt to tolerate leg press station x 10-12 minutes( R LE only)   Long Term Goals   Additional Documentation yes   Time For Goal Achievement Other (comment)  (10-12 days)   Pt Will Perform Supine To Sit Independently   Pt Will Perform Sit to Supine Independently   Pt Will Logroll " "Independently   Pt Will Transfer Sit to Stand With RW;Supervision   Pt Will Transfer Bed/Chair Stand Pivot;With RW;Supervision   Pt Will Ambulate > 200 feet;With RW;With contact guard assistance   Pt Will Go Up / Down Stairs 1 flight;With 2 Rails;With contact guard assist   Pt Will Go Up / Down Curb Step 4" curb step;With RW;With contact guard assist   Pt Will Stand 6-10 min;With RW;With stand by assist  (while performing UE task( e.g., BITS))   Pt Will Tolerate Exercise 15-20 reps;With stand by assist   Pt Will Propel Wheelchair > 150 feet;Modfied Independently;With (B) UE   Other Goal stand pivot transfers w/c<> 3 in 1 commode with RW and supervision   Other Goal 2 pt to step into and out of tub with min A and use of grab bar   Other Goal 3  to be independent with assisting pt as needed at time of discharge   Discharge Recommendations   Equipment Needed After Discharge 3-in-1 commode;walker, rolling;shower chair   Discharge Facility/Level Of Care Needs outpatient PT   Plan   Planned Therapy Intervention Plan of care initiated;Bed mobility training;Transfer training;Gait training;Balance Training;ROM;Strengthening;Neuromuscular Re-education;Motor Coordination Training;Postural Re-education;Wheelchair Management/Propulsion   Therapy Frequency 2 times/day;daily;Monday-Friday;Saturday or Sunday   Physical Therapy Follow-up   PT Follow-up? Yes   PT - Next Visit Date 05/04/17   Other Comments   Comments Trunk assessment: pt demonstrates good ability to perform ant/post pelvic tilt, lateral scooting on mat; pt able to laterally tilt pelvis well to R but less so to L.  Scapular movements appear to be functional with corresponding UE movements.   Treatment/Billable Minutes   Evaluation 60   Therapeutic Activity 22   Therapeutic Exercise 8   Total Time 90         Addendum to above:  MMT for LE as follows:  R LE: 4(-) knee flex/ext, 3+ ankle dorsiflex, 4+ ankle plantarflex, 2+ hip flex  L LE: 4(-) hip flex, 5/5 knee " flex/ext and ankle df/pf    L UE 5/5; R UE grossly 4+/5 with decreased smoothness of movement and ability to sustain contractions      Kelvin Sung, PT 5/3/2017

## 2017-05-03 NOTE — PROGRESS NOTES
Admit Date: 5/2/2017   LOS: 1 day     HPI, PFSH reviewed -- No change from admission.    Interval History/ROS: No acute events over night. Tolerating therapy evaluations, denied pain or discomfort.   - No shortness of breath, chest pain, nausea, or vomiting      Functional: Min/Mod for bed mobility, Min A for sit to stand, Mod/Max for transfers, Mod I w eating, Supervision w grooming, Min A for UBD, toileting    Physical Exam:  Vital Signs Range (Last 24H):  Temp:  [97.6 °F (36.4 °C)]   Pulse:  [59-67]   Resp:  [16-17]   BP: (136-143)/(70-75)   SpO2:  [94 %-95 %]     General Appearance:  In no acute distress and comfortable    - Vital Signs reviewed  Neuro/Cognition: Alert, Awake, Oriented to person, place, situation    - Followed all commands   Psych/Affect: Appropriate   Cardiac: Normal rate   Pulmonary: No labored breathing, on RA   Abdomen: soft, non-tender, and not distended     : Voiding spontaneously, No momin   Extremities: No calf tenderness, no local swelling   Skin: Intact, Warm  Musculoskeletal: LUE/LLE WFL  RUE: SA 5/5, EF, EE,  5/5  RLE HF 3/5, KE 3/5, DF/PF 5/5    Scheduled Medications:   amlodipine  5 mg Oral Daily    aspirin  81 mg Oral Daily    atorvastatin  40 mg Oral Daily    docusate sodium  100 mg Oral BID    enoxaparin  40 mg Subcutaneous Daily       PRN Medications:   acetaminophen, bisacodyl, ondansetron    Review of patient's allergies indicates:  No Known Allergies    Recent Laboratory Results:  Lab Results   Component Value Date    CREATININE 0.9 05/03/2017    BUN 17 05/03/2017     05/03/2017    K 4.0 05/03/2017     05/03/2017    CO2 31 (H) 05/03/2017     Lab Results   Component Value Date    WBC 4.74 05/03/2017    HGB 13.1 05/03/2017    HCT 40.7 05/03/2017    MCV 94 05/03/2017     05/03/2017     No results found for: PTT      Evaluation:   Antonina Butler is a 71 y.o. female admitted to inpatient rehabilitation on 5/2/2017 for stroke with impaired  mobility and ADLs. Patient remains appropriate for PT, OT, and as required Speech therapy. Patient continues to require 24 hour nursing care as well as daily Physician assessment.     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, mild cog deficits, dysphagia   - cont w ASA/statin   - F/U with Neuro as OP   - cont w PT, OT, Speech      HTN  - cont w Amlodipine daily  - monitor BP/HR closely as there can be fluctuation with initiation of therapy   5/3 VSS, cont w current mgmt      Pain: Will monitor closely and adjust as necessary   - Tylenol 650mg Q6 hrs PRN     Monitoring:    Nutrition/Swallow Status: Dental Soft Liquid Diet Level: Thin Feed   - Prealbumin -   22  - Nutritionist on board   Bladder Management: continent  Bowel Management: continent  - Colace BID and Suppository PRN   - Will monitor for regularity   Mood: stable   Sleep: monitor; Will consider sleep aid as clinically indicated   Skin: Monitor   DVT ppx: Lovenox 40mg daily     Discharge Planning:  Tentative Discharge Date: pending   Tentative DC location: Home with Family     No future appointments.      Arlene Ramirez MD  [facility] [place]

## 2017-05-03 NOTE — PROGRESS NOTES
BLADDER  [x] Pt. uses toilet (7)  [] Pt. is on dialysis - does not urinate (7)  [] Pt. uses bedside commode (5)  []Pt. uses bedpan - staff does____% (includes rolling on/off, holding bedpan in place                                                                   and emptying pan)   [] Pt. uses urinal - staff empties                          []   Pt. needs help with positioning the urinal (4)                          []   Pt. needs help holding the urinal (2)  []  Pt. has momin catheter/suprapubic catheter - staff empties (1)  []  Pt. Is on ICP program:                           []  Pt. does catheterization (6)                           [] Staff does catheterization (1)  [] Pt. Is incontinent - staff does _______% of tasks  Number of accidents during this shift ____    BOWEL  [x] Pt. uses toilet (7)  [] Pt. uses bedside commode (5)  [] Pt. uses bedpan - staff does _____% of task  [] Pt. is on bowel program::                         []   Pt. inserts suppository (6)                         []   Nurse inserts suppository (4)                         []  Nurse does dig. stim. (1)  [] Pt. has colostomy - staff maintains care and empties (1)                       Pt. does __  ___% of care  [] Pt. is incontinent - staff does __  ___% of tasks  [] No bowel movement during this shift  [] Number of accidents during this shift ____    EATING  [x] Pt. opens packages, cuts food, pours liquids, and feeds self, regular consistency (7)  [] Pt. needs dentures, swallow technique, special foods or drink consistency (6)  [] Pt. needs supervision (cueing), setup (open containers, cut food, etc.) (5)  []Pt. needs assist with occasional scooping, or checking for food pocketing (75-90%) (4)  []Pt. needs assist to lead each bite on utensils, patient brings food to mouth (50-74%)(3)  []Pt. needs assist to scoop, bring food to mouth (hand over hand) (25-49%) (2)  []AxillaryPt. Is receiving IV fluids for hydration or tube feeding  (1)    GROOMING  []Pt. performs all tasks independently and safely (7)  []Pt. obtains all articles, needs adaptive/assistive device (wash mitt, etc.) (6)  []Pt. needs supervision (cueing), setup (5)  []Pt. doing 3 of 4 or 4 of 5 tasks, needs assist to put in or remove dentures, steadying (Patient doing 75-90%) (4)  []Pt. doing 2 of 4 or 3 of 5 tasks (3)  []Pt. doing 1 of 4 of 2 of 5 tasks (25-49%) (2)  []Pt. doing 0 of tasks, needs assistance of 2 helpers (1)  []Activity occurred with OT    BATHING  []Pt. safety bathes whole body (10 parts of 10) (7)  []Pt. doing 10 of 10 body parts, uses adaptive devices (wash mitt, etc.) (6)  []Pt. doing 10 of 10 body parts or needs supervision or setup (5)  []Pt. doing 8-9 of 10 body parts (75-99%) (4)  []Pt. doing 5-7 of 10 body parts (50-74%) (3)  []Pt. doing 3-4 of 10 body parts (25-49%( (2)  []Pt. doing 0-2 of 10 body parts (0-24%0 (1)  []Activity occurred with OT    DRESSING UPPER BODY  []Pt. dresses or undresses independently, obtaining clothes from storage area (7)  []Pt. needs adaptive devices (6)  []Pt. needs supervision (cueing), setup (5)  []Pt. doing 75-99% (4)  []Pt. doing 50-74% (3)  []Pt. doing 25-49% (2)  []Pt. doing 0-24% or needs assist of 2 helpers (1)  []Activity occurred with OT    DRESSING LOWER BODY  []Pt. dresses or undresses independently (7)  []Pt. needs adaptive devices (6)  []Pt. needs supervision (cueing), set up helper applies EVAN hose or AFO (5)  []Pt. doing 75-99%, needs steadying assist, fastening a belt, etc.) (4)  []Pt. doing 50-74% (3)  []Pt. doing 25-49% (2)  []Pt. doing 0-24% or needs assist of 2 helpers (1)    TOILETING  []Pt. doing 3 of 3 tasks (pulling down pants, cleaning yanet area, pulling up pants) (7)  []Pt. doing all 3 parts but needs assistive device (grab bar) (6)  [x]Pt. needs supervision or setup (5)  [x]Pt. doing 3 of 3 parts (75-99%) (4)  []Pt. doing 2 of 3 parts (50-74%) (3)  []Pt. doing 1 of 3 parts (25.49%) (2)  []Pt. needs  assist (more than steadying) with all 3 parts or needs assist of 2 helpers,  Incontinent of B/B today (0-24%) (1)    BED/ CHAIR/WHEELCHAIR TRANSFER  []Pt. transfers without assistive device into and out of wheelchair/bed/chair (7)  []Pt. uses assistive/adaptive devices (grab bars, sliding board, walker, bed rails,   wheelchair arm rests, etc.) (6)  []Pt. needs supervision, cues, head of bed raised by staff (5)  []Pt. needs steadying assist with any or all parts of transfer, needs assist with one   leg during transfer ( 4)  []Pt. needs lifting or lowering, needs assist with 2 legs during transfer (3)  []Pt. needs lifting and lowering (2)  []Pt. needs assist of 2 helpers(even if 2nd person is just there for safety) or mechanical lift (1)  []Activity did not occur     TOILET TRANSFER  []Pt. transfers from wheelchair or walking without assistive device (7)  []Pt. uses assistive/adaptive device (commode, grab bars, sliding board, walker,   prosthesis, orthotic, raised toilet seat, etc.) (6)  [x]Pt. needs supervision, cues, or setup (needs assist to lock brakes, remove leg or arm  rest, position sliding board) (5)  []Pt. needs steadying assist with any or all parts of transfer, needs assist with one leg  during transfer (4)  []Pt. needs lifting or lowering, needs assist with two legs during transfer (3)  []Pt. needs lifting and lowering (2)  []Pt. needs assist of 2 helpers or mechanical lift (1)  []Activity did not occur    SHOWER TRANSFER  []Pt. transfers without assistive device into and out of shower (7)  []Pt. uses assistive/adaptive device (shower bench, grab bars, etc.) (6)  []Pt. needs supervision, cues, or setup (5)  []Pt. needs steadying assist with any or all parts of transfer, needs assist with one leg  during transfer (4)  []Pt. needs lifting or lowering, needs assist with 2 legs during transfer (3)  []Pt. needs lifting and lowering (2)  []Pt. needs assist of 2 helpers, helper pushes shower chair on wheels in  and out of  shower (1)

## 2017-05-03 NOTE — PROGRESS NOTES
Occupational Therapy   FIM Scores    Antonina Butler   MRN: [id number]      05/03/17 0730   Transfers   Bed/Chair/WC 2   Toilet 3   Shower 3   Self Care   Eating 6   Grooming 5   Bathing 3   Dressing-Upper 4   Dressing-Lower 3   Toileting 4   Communication   Comprehension 6   Mode B   Expression 6   Mode B   Social Cognition   Social Interaction 6   Problem Solving 5   Memory 5     BRI Gomez   5/3/2017

## 2017-05-03 NOTE — PROGRESS NOTES
Physical Therapy  Admission FIM scores( PT)    Antonina Butler   MRN: [id number]                    05/03/17 1237   Transfers   Bed/Chair/WC 4   Toilet 4   Tub 3   Locomotion   Distance Walked 2   Distance Wheelchair 1   Walk 2   Wheelchair 1   Mode C   Stairs 2           Kelvin Sung, PT 5/3/2017

## 2017-05-04 PROCEDURE — 97112 NEUROMUSCULAR REEDUCATION: CPT

## 2017-05-04 PROCEDURE — 25000003 PHARM REV CODE 250: Performed by: PHYSICAL MEDICINE & REHABILITATION

## 2017-05-04 PROCEDURE — 12800000 HC REHAB SEMI-PRIVATE ROOM

## 2017-05-04 PROCEDURE — 97530 THERAPEUTIC ACTIVITIES: CPT

## 2017-05-04 PROCEDURE — 63600175 PHARM REV CODE 636 W HCPCS: Performed by: HOSPITALIST

## 2017-05-04 PROCEDURE — 97150 GROUP THERAPEUTIC PROCEDURES: CPT

## 2017-05-04 PROCEDURE — 25000003 PHARM REV CODE 250: Performed by: HOSPITALIST

## 2017-05-04 PROCEDURE — 99232 SBSQ HOSP IP/OBS MODERATE 35: CPT | Mod: ,,, | Performed by: PHYSICAL MEDICINE & REHABILITATION

## 2017-05-04 PROCEDURE — 92507 TX SP LANG VOICE COMM INDIV: CPT

## 2017-05-04 RX ADMIN — ATORVASTATIN CALCIUM 40 MG: 20 TABLET, FILM COATED ORAL at 09:05

## 2017-05-04 RX ADMIN — ASPIRIN 81 MG: 81 TABLET, COATED ORAL at 09:05

## 2017-05-04 RX ADMIN — ENOXAPARIN SODIUM 40 MG: 100 INJECTION SUBCUTANEOUS at 05:05

## 2017-05-04 RX ADMIN — AMLODIPINE BESYLATE 5 MG: 5 TABLET ORAL at 09:05

## 2017-05-04 RX ADMIN — DOCUSATE SODIUM 100 MG: 100 CAPSULE, LIQUID FILLED ORAL at 09:05

## 2017-05-04 NOTE — PROGRESS NOTES
Admit Date: 5/2/2017   LOS: 2 days     HPI, PFSH reviewed -- No change from admission.    Interval History/ROS: No acute events over night. Tolerating therapy, denied pain or discomfort. Slept well over night.  - No shortness of breath, chest pain, nausea, or vomiting      Functional: Min/Mod for bed mobility, Min A for sit to stand, Min/Mod for transfers, Mod I w eating, Supervision w grooming, Min A for UBD, toileting    Physical Exam:  Vital Signs Range (Last 24H):  Temp:  [97.5 °F (36.4 °C)-97.9 °F (36.6 °C)]   Pulse:  [58-72]   Resp:  [16-18]   BP: (110-128)/(61-82)   SpO2:  [97 %]     General Appearance:  In no acute distress and comfortable, sitting EOB     - Vital Signs reviewed  Neuro/Cognition: Alert, Awake, Oriented to person, place, situation    - Followed all commands   Psych/Affect: Appropriate   Cardiac: Normal rate   Pulmonary: No labored breathing, on RA   Abdomen: soft, non-tender, and not distended     : Voiding spontaneously, No momin   Extremities: No calf tenderness, no local swelling   Skin: Intact, Warm  Musculoskeletal: LUE/LLE WFL  RUE: SA 5/5, EF, EE,  5/5  RLE HF 3/5, KE 3/5, DF/PF 5/5    Scheduled Medications:   amlodipine  5 mg Oral Daily    aspirin  81 mg Oral Daily    atorvastatin  40 mg Oral Daily    docusate sodium  100 mg Oral BID    enoxaparin  40 mg Subcutaneous Daily       PRN Medications:   acetaminophen, bisacodyl, ondansetron    Review of patient's allergies indicates:  No Known Allergies    Recent Laboratory Results:  Lab Results   Component Value Date    CREATININE 0.9 05/03/2017    BUN 17 05/03/2017     05/03/2017    K 4.0 05/03/2017     05/03/2017    CO2 31 (H) 05/03/2017     Lab Results   Component Value Date    WBC 4.74 05/03/2017    HGB 13.1 05/03/2017    HCT 40.7 05/03/2017    MCV 94 05/03/2017     05/03/2017     No results found for: PTT      Evaluation:   Antonina Butler is a 71 y.o. female admitted to inpatient rehabilitation on  5/2/2017 for stroke with impaired mobility and ADLs. Patient remains appropriate for PT, OT, and as required Speech therapy. Patient continues to require 24 hour nursing care as well as daily Physician assessment.     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, mild cog deficits, dysphagia   - cont w ASA/statin   - F/U with Neuro as OP   - cont w PT, OT, Speech      HTN  - cont w Amlodipine daily  - monitor BP/HR closely as there can be fluctuation with initiation of therapy   5/4 VSS, cont w current mgmt      Pain: Will monitor closely and adjust as necessary   - Tylenol 650mg Q6 hrs PRN     Monitoring:    Nutrition/Swallow Status: Dental Soft Liquid Diet Level: Thin Feed   - Prealbumin -   22  - Nutritionist on board   Bladder Management: continent  Bowel Management: continent  - Colace BID and Suppository PRN   - Will monitor for regularity   Mood: stable   Sleep: monitor; Will consider sleep aid as clinically indicated   Skin: Monitor   DVT ppx: Lovenox 40mg daily     Discharge Planning:  Tentative Discharge Date: pending   Tentative DC location: Home with Family     No future appointments.      Arlene Ramirez MD  [facility]

## 2017-05-04 NOTE — PROGRESS NOTES
"Occupational Therapy   PM Treatment    Antonina Butler   MRN: [id number]      05/04/17 1353   OT Time Calculation   OT Start Time 1353   OT Stop Time 1438   OT Total Time (min) 45 min   General   OT Date of Treatment 05/04/17   Precautions   General Precautions fall   Visual/Auditory Vision impaired   Subjective   Patient states "It feels like I lost control of my knee and my back"   Pain/Comfort   Pain Rating no pain   Transfers   Transfer yes   Sit to Stand   Sit <> Stand Assistance Minimum Assistance   Sit <> Stand Assistive Device No Assistive Device   Trials/Comments stabilizing R knee to prevent buckling   Stand to Sit   Assistance Contact Guard Assistance;Minimum Assistance   Assistive Device No Assistive Device   Mat to Chair   Technique Squat Pivot   Assistance Minimum Assistance   Assistive Device No Assistive Device   Trials/Comments support at knee to prevent buckling   Tub Bench Transfer   Tub Transfer Technique Stand Pivot   Tub Bench Transfer Assistance Moderate Assistance   Tub Transfer Assistive Device No Assistive Device   Trials/Comments    Additional Activities   Additional Activities Other (Comment)   Additional Activities Comments  - Jatin Instrument tool complete with R hand only in good time with no difficulty.    -Pt stood at countertop, tf'd grocery items from overhead cabinet to countertop with min-mod A.   - Pre-gait training exercises with facilitation of weight shift to R side (with knee stabilized), pt attempted to take side steps, forward, and backward with L foot. Pt with significant difficulty coordinating weight shift and stepping forward with L foot despite cues and tactile facilitation. Pt would benefit from further practice.      Activity Tolerance   Activity Tolerance Patient tolerated treatment well   Medical Staff Made Aware NSG   After Treatment   Patient Position After Treatment Seated in wheelchair   Patient after treatment left call button in reach   Assessment "   Prognosis Good   Problem List Decreased Self Care skills;Decreased upper extremity strength;Decreased safe judgment during ADL;Decreased cognition;Decreased endurance;Visual deficit;Decreased fine motor control;Decreased functional mobility;Decreased gross motor control;Decreased IADLs;Decreased trunk control for functional activities;Decreased Function of left upper extremity;Decreased sensation   Assessment Pt progressing slowly, still requires Min-Mod A for standing functional activities due to buckling in R knee. Pt also has some difficulty coordinating trunk movements (weightshifting) with LE during pre-gait exercises.    Level of Motivation/Participation good   Discharge Recommendations   Equipment Needed After Discharge 3-in-1 commode;walker, rolling;shower chair   Discharge Facility/Level Of Care Needs outpatient OT   Plan   Plan Continue with current plan   Therapy Frequency 2 times/day;Monday-Friday;Saturday or Sunday   Occupational Therapy Follow-up   OT Follow-up? Yes   Treatment/Billable Minutes   Therapeutic Activity 15   Neuromuscular Re-ed 30   Total Time 45     BRI Gomez   5/4/2017

## 2017-05-04 NOTE — PROGRESS NOTES
"Speech Therapy  Cognitive/Language Evaluation    Antonina Butler   MRN: [id number]        05/03/17 1300   Speech Time Calculation   Speech Start Time 1300   Speech Stop Time 1350   Speech Total (min) 50 min   General Information   SLP Treatment Date 05/03/17   Referring Physician Dr Ramirez   General Observations Pt seen individually in St office sitting upright in w/c.   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 You can just leave me here."   Pain/Comfort   Pain Rating no pain   Assessment   SLP Diagnosis mild cognitive deficits   Prognosis Good   Problem List decreased safety awareness skills; decreased processing skills; mildly delayed responses   Assessment Pt is a 72 y/o female admitted following 6 mm lacunar infarction in the posterior limb of the left internal capsule. Pt previously living independently with spouse and three adult grandchildren, including driving. Pt presents today with decreased safety awareness, poor insight into current deficits, hearing loss, and decreased processing skills. Pt currently tolerated regular diet with thin liquids without difficulty reported. 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 Pt is oriented x4 independently with 100% acc. Within social interaction skills, pt noted with mild deficits of processing skills with mild delay in responses as well. Pt completed complex YNQ task with 100% acc, followed complex directions with 100% acc, completed auditory comprehension of paragraph level with 100% acc and reading comprehension task with 100% acc. Pt completed speech " intelligibility task with 100% intelligibility without dysarthria noted at time of evaluation. Oral motor exam-WFL. Pt completed divergent naming task with 21 items listed in one minute with 15-20 the norm. Pt completed responsive naming with 100% acc and simple reasoning task with 100% acc. Immediate memory task completed with 85% acc, STM task completed with 100% acc and LTM task completed with 100% acc. Simple verbal sequencing task completed with 100% acc as well. Pt noted with deficits of processing within simple problem solving tasks with 75% acc and simple math tasks with 75% acc.     Pt to benefit from skilled St services targeting thought organization, processing skills, and executive functioning cognitive tasks for return to baseline status and independent living. Pt agreeable to plan of care with education provided. Pt also to benefit from CVA education.   Level of Motivation/Participation good   Recommendations   Solid Diet Level Dental Soft   Liquid Diet Level Thin   Short Term Goals   Goal 1 Pt will complete problem solving/reasoning complex tasks with modified independence with 95% acc.   Goal 2 Pt will complete recall task with modified independence with 95% acc.   Goal 3 Pt will complete executive functioning financial/medication management tasks independently with 100% acc.   Long Term Goals   Goal 1 Pt will complete problem solving/reasoning complex tasks with independence with 100% acc.   Goal 2 Pt will complete recall task with independence with 100% acc.   Goal 3 Pt will participate in CVA education.    Discharge Recommendations   Discharge Facility/Level Of Care Needs outpatient speech therapy   Plan   Plan Plan of care intiated;Speech Language/Cognitive Therapy;Patient Education;Family Education   Therapy Frequency Monday-Friday   Plan of Care Expires on 06/03/17   SLP Follow-up   SLP Follow-up? Yes   Treatment/Billable Minutes   Eval 50   Total Time 50     The patient's spiritual, cultural,  social, and educational needs were considered with no evidence of barriers noted, and the patient is agreeable to plan of care.    FIM Scores  Comprehension:6  Expression: 6  Social Interaction:6  Problem Solvin  Memory: 5    Comprehension:  Complex= humor, finances, rationale for medical treatment(hip precautions, pressure relief)  Basic= pain, hunger, thirst, bathroom needs, cold, nutrition, sleep    Understands complex/abstract conversation/directions with extra time, assistance device(glasses, if visual mode or both; hearing aide if auditory mode or both) (6)    Expression:  Expresses complex / abstract ideas with extra time, assistive devic (augmentative communication device    (6)    Social Interaction:  Interacts appropriately with others with medication or extra time(anti-anxiety, antidepressant)  (6)    Problem Solving:  Solves complex problems with cues  (5)    Memory:  Recognizes, recalls, or executes 3 steps of 3 step request 90% of time (cueing, reminders<10%, loses track of time)  (5)    Stefany Fagan CCC-SLP  5/3/2017

## 2017-05-04 NOTE — CONSULTS
[facility]  Adult Nutrition  Consult Note    SUMMARY     Recommendations  1. Continue current diet   2. RD to monitor  Goals: PO intake >50% meals  Nutrition Goal Status: new  Communication of RD Recs: other (comment) (sticky note)    Continuum of Care Plan  D/C planning: adequate PO intake     Reason for Assessment  Reason for Assessment: physician consult  Diagnosis: stroke/CVA  Relevent Medical History: HTN, CVA, dysphagia   Interdisciplinary Rounds: did not attend    Nutrition Prescription Ordered  Current Diet Order: Dental soft  Oral Nutrition Supplement: none     Evaluation of Received Nutrients/Fluid Intake     Tolerance: tolerating     Nutrition Risk Screen   Nutrition Risk Screen: no indicators present    Nutrition/Diet History  Patient Reported Diet/Restrictions/Preferences: general  Food Preferences: no cultural or Sikhism food preferences identified       Labs/Tests/Procedures/Meds  Pertinent Labs Reviewed: reviewed, pertinent   prealbumin 22, total pro 6.2, alb 3.9 (all WNL)  Pertinent Medications Reviewed: reviewed, pertinent  Pertinent Medications Comments: asa, atorvastatin, docusate sodium      Physical Findings  Overall Physical Appearance:  (WNL)  Skin: intact    Anthropometrics  Height (inches): 61.97 in  Weight Method: Bed Scale  Weight (kg): 50.3 kg   Ideal Body Weight (IBW), Female: 109.85 lb  % Ideal Body Weight, Female (lb): 100.95 lb  BMI (kg/m2): 20.3  BMI Grade: 18.5-24.9 - normal       Estimated/Assessed Needs  Weight Used For Calorie Calculations: 50.3 kg (110 lb 14.3 oz)   Height (cm): 157.4 cm  Energy Need Method: Kcal/kg, [place]-St Jeor (1352-1509kcal/day)  30 kcal/kg (kcal): 1509   RMR (Hampton-St. Jeor Equation): 966.09  Weight Used For Protein Calculations: 50.3 kg (110 lb 14.3 oz)  Protein Requirements: 40-50g/day  0.8 gm Protein (gm): 40.32 and 1.0 gm Protein (gm): 50.41  Fluid Need Method: RDA Method (1 ml per kcal or per MD)           Assessment and  Plan  No nutrition diagnosis at this time    Monitor and Evaluation  Food and Nutrient Intake: energy intake, food and beverage intake  Food and Nutrient Adminstration: diet order  Anthropometric Measurements: weight, weight change  Biochemical Data, Medical Tests and Procedures: electrolyte and renal panel  Nutrition-Focused Physical Findings: overall appearance  % Intake of Estimated Energy Needs: 75 - 100 %  % Meal Intake: 100%    Nutrition Risk    Level of Risk: other (see comments) (once weekly follow up)    Nutrition Follow-Up     yes

## 2017-05-04 NOTE — PROGRESS NOTES
"Physical Therapy   Treatment    Antonina Butler   MRN: [id number]                  05/04/17 1446   PT Time Calculation   PT Start Time 1446   PT Stop Time 1541   PT Total Time (min) 55 min   Treatment   Treatment Type Treatment   PT/PTA PT   General   PT Received On 05/04/17   Family/Caregiver Present Yes  ( arrived during session)   Patient Found (position) Seated in wheelchair;Other (comment)  (in room)   Pt found with (posey belt in place)   Precautions   General Precautions fall   Visual/Auditory Vision impaired;Hearing impaired  (pt wears glasses)   Subjective   Patient states " a little fatigue in my back."   Pain/Comfort   Pain Rating no pain   Pain Rating Post-Intervention no pain   Bed Mobility   Bed Mobility no   Transfers   Transfer yes   Sit to Stand   Sit <> Stand Assistance Contact Guard Assistance;Minimum Assistance   Sit <> Stand Assistive Device No Assistive Device   Trials/Comments multiple trials under varying conditions   Stand to Sit   Assistance Contact Guard Assistance;Minimum Assistance   Assistive Device No Assistive Device   Trials/Comments multiple trials under varying conditions   Chair to Mat   Chair<> Mat Technique Stand Pivot   Chair<>Mat Assistance Contact Guard Assistance   Chair <> Mat Assistive Device No Assistive Device   Trials/Comments multiple trials( > 5) to emphasize proper sequence, hand/foot placement and weight acceptance to R LE   Mat to Chair   Technique Stand Pivot   Assistance Contact Guard Assistance   Assistive Device No Assistive Device   Trials/Comments multiple trials( > 5) to emphasize proper sequence, hand/foot placement and weight acceptance to R LE   Wheelchair Activities   Propulsion No   Gait   Gait No   Stairs   Stairs No   Balance   Balance Yes   Dynamic Standing Balance   Dynamic Standing-Balance Support No upper extremity supported;Left upper extremity supported   Dynamic Standing-Balance (balance tasks to increase weight bearing to R LE)   Dynamic " Standing-Comments PT places furniture sliders to pt's B feet and has pt stand from edge of mat( with sideward approach).  PT provides assistance at pt's trunk/hip and knee and pt attempts to slide L foot forward/back and side<> side, occasionally with light contact on stool with L hand~ pt needs max A and constant cueing for balance, weight shift, trunk and knee control.   Other Activities   Other Activities Other (Comment)  (pt performs another standing activity against wall)   Comments PT assists pt to stand with back against wall and straps gait belt around PT and pt's LEs to prevent R knee hyperextension during task.  Pt repeatedly performs knee flex, forward movement of trunk and finally hip extension.  Pt then reverses this motion, repeatedly upon command.  PT also has pt perform with feet a bit farther away from wall to increase demand on pt.  This task targets pt's ability to co-contract her knee musculature and encourages proper sit<> stand technique.   Activity Tolerance   Activity Tolerance Patient tolerated treatment well;Patient limited by fatigue;Other (Comment)  (pt a bit apprehensive with unilateral R LE weight acceptance)   After Treatment   Patient Position After Treatment Seated in wheelchair   Patient after treatment left (posey belt in place~  escorts pt to room)   Assessment   Prognosis Good   Problem List Decreased strength;Decreased endurance;Impaired balance;Decreased mobility;Decreased cognition;Impaired hearing;Decreased safety awareness;Impaired vision   Assessment Pt demonstrated improved stand pivot transfers this session, though pt requires considerable cueing for hand/foot placement and proper timing of events.  Pt also did very well with standing task against wall.  Unfortunately, attempts to increase weight acceptance to R LE in standing were more challenging than PT anticipated( perhaps due to dec. cognition and fear of falling).  This task will be revisited and PT would  like to see how pt might ambulate with RW in next session.  Pt remains pleasant and rehab appropriate.   Level of Motivation/Participation good   Barriers to Discharge None   Barriers to Discharge Comments  can assist at time of discharge   Discharge Recommendations   Equipment Needed After Discharge 3-in-1 commode;walker, rolling;shower chair   Discharge Facility/Level Of Care Needs outpatient PT   Plan   Planned Therapy Intervention Continue with current plan;Bed mobility training;Transfer training;Gait training;Balance Training;Strengthening;Neuromuscular Re-education;Motor Coordination Training;Postural Re-education;Wheelchair Management/Propulsion   Therapy Frequency 2 times/day;daily;Monday-Friday;Saturday or Sunday   Physical Therapy Follow-up   PT Follow-up? Yes   PT - Next Visit Date 05/05/17   Treatment/Billable Minutes   Therapeutic Activity 15   Neuromuscular Re-education 40   Total Time 55           Kelvin Sung, PT 5/4/2017

## 2017-05-04 NOTE — PROGRESS NOTES
Physical Therapy   FIM scores    Antonina Butler   MRN: [id number]                05/04/17 1653   Transfers   Bed/Chair/WC 4         Kelvin Sung, PT 5/4/2017

## 2017-05-04 NOTE — PROGRESS NOTES
Occupational Therapy   FIM Scores    Antonina Silvadago   MRN: [id number]      05/04/17 1353   Transfers   Bed/Chair/WC 4   Tub 3     BRI Gomez   5/4/2017

## 2017-05-04 NOTE — PROGRESS NOTES
BLADDER  [x] Pt. uses toilet (7)  [] Pt. is on dialysis - does not urinate (7)  [] Pt. uses bedside commode (5)  []Pt. uses bedpan - staff does____% (includes rolling on/off, holding bedpan in emiliano                                                                and emptying pan)   [] Pt. uses urinal - staff empties                          []   Pt. needs help with positioning the urinal (4)                          []   Pt. needs help holding the urinal (2)  []  Pt. has momin catheter/suprapubic catheter - staff empties (1)  []  Pt. Is on ICP program:                           []  Pt. does catheterization (6)                           [] Staff does catheterization (1)  [] Pt. Is incontinent - staff does ______% of tasks  Number of accidents during this shift ______    BOWEL  [] Pt. uses toilet (7)  [] Pt. uses bedside commode (5)  [] Pt. uses bedpan - staff does _______% of task  [] Pt. is on bowel program::                         []   Pt. inserts suppository (6)                         []   Nurse inserts suppository (4)                         []  Nurse does dig. stim. (1)  [] Pt. has colostomy - staff maintains care and empties (1)                       Pt. does _____% of care  [] Pt. is incontinent - staff does _____% of tasks  [x] No bowel movement during this shift  [] Number of accidents during this shift ____    EATING  [x] Pt. opens packages, cuts food, pours liquids, and feeds self, regular consistency (7)  [] Pt. needs dentures, swallow technique, special foods or drink consistency (6)  [] Pt. needs supervision (cueing), setup (open containers, cut food, etc.) (5)  []Pt. needs assist with occasional scooping, or checking for food pocketing (75-90%) (4)  []Pt. needs assist to lead each bite on utensils, patient brings food to mouth (50-74%)(3)  []Pt. needs assist to scoop, bring food to mouth (hand over hand) (25-49%) (2)  []AxillaryPt. Is receiving IV fluids for hydration or tube feeding  (1)    GROOMING  []Pt. performs all tasks independently and safely (7)  []Pt. obtains all articles, needs adaptive/assistive device (wash mitt, etc.) (6)  []Pt. needs supervision (cueing), setup (5)  []Pt. doing 3 of 4 or 4 of 5 tasks, needs assist to put in or remove dentures, steadying (Patient doing 75-90%) (4)  []Pt. doing 2 of 4 or 3 of 5 tasks (3)  []Pt. doing 1 of 4 of 2 of 5 tasks (25-49%) (2)  []Pt. doing 0 of tasks, needs assistance of 2 helpers (1)  []Activity occurred with OT    BATHING  []Pt. safety bathes whole body (10 parts of 10) (7)  []Pt. doing 10 of 10 body parts, uses adaptive devices (wash mitt, etc.) (6)  []Pt. doing 10 of 10 body parts or needs supervision or setup (5)  []Pt. doing 8-9 of 10 body parts (75-99%) (4)  []Pt. doing 5-7 of 10 body parts (50-74%) (3)  []Pt. doing 3-4 of 10 body parts (25-49%( (2)  []Pt. doing 0-2 of 10 body parts (0-24%0 (1)  []Activity occurred with OT    DRESSING UPPER BODY  []Pt. dresses or undresses independently, obtaining clothes from storage area (7)  []Pt. needs adaptive devices (6)  []Pt. needs supervision (cueing), setup (5)  []Pt. doing 75-99% (4)  []Pt. doing 50-74% (3)  []Pt. doing 25-49% (2)  []Pt. doing 0-24% or needs assist of 2 helpers (1)  []Activity occurred with OT    DRESSING LOWER BODY  []Pt. dresses or undresses independently (7)  []Pt. needs adaptive devices (6)  []Pt. needs supervision (cueing), set up helper applies EVAN hose or AFO (5)  []Pt. doing 75-99%, needs steadying assist, fastening a belt, etc.) (4)  []Pt. doing 50-74% (3)  []Pt. doing 25-49% (2)  []Pt. doing 0-24% or needs assist of 2 helpers (1)    TOILETING  []Pt. doing 3 of 3 tasks (pulling down pants, cleaning yanet area, pulling up pants) (7)  []Pt. doing all 3 parts but needs assistive device (grab bar) (6)  [x]Pt. needs supervision or setup (5)  []Pt. doing 3 of 3 parts (75-99%) (4)  []Pt. doing 2 of 3 parts (50-74%) (3)  []Pt. doing 1 of 3 parts (25.49%) (2)  []Pt. needs  assist (more than steadying) with all 3 parts or needs assist of 2 helpers,  Incontinent of B/B today (0-24%) (1)    BED/ CHAIR/WHEELCHAIR TRANSFER  []Pt. transfers without assistive device into and out of wheelchair/bed/chair (7)  []Pt. uses assistive/adaptive devices (grab bars, sliding board, walker, bed rails,   wheelchair arm rests, etc.) (6)  [x]Pt. needs supervision, cues, head of bed raised by staff (5)  []Pt. needs steadying assist with any or all parts of transfer, needs assist with one   leg during transfer ( 4)  []Pt. needs lifting or lowering, needs assist with 2 legs during transfer (3)  []Pt. needs lifting and lowering (2)  []Pt. needs assist of 2 helpers(even if 2nd person is just there for safety) or mechanical lift (1)  []Activity did not occur     TOILET TRANSFER  []Pt. transfers from wheelchair or walking without assistive device (7)  []Pt. uses assistive/adaptive device (commode, grab bars, sliding board, walker,   prosthesis, orthotic, raised toilet seat, etc.) (6)  [x]Pt. needs supervision, cues, or setup (needs assist to lock brakes, remove leg or arm  rest, position sliding board) (5)  []Pt. needs steadying assist with any or all parts of transfer, needs assist with one leg  during transfer (4)  []Pt. needs lifting or lowering, needs assist with two legs during transfer (3)  []Pt. needs lifting and lowering (2)  []Pt. needs assist of 2 helpers or mechanical lift (1)  []Activity did not occur    SHOWER TRANSFER  []Pt. transfers without assistive device into and out of shower (7)  []Pt. uses assistive/adaptive device (shower bench, grab bars, etc.) (6)  []Pt. needs supervision, cues, or setup (5)  []Pt. needs steadying assist with any or all parts of transfer, needs assist with one leg  during transfer (4)  []Pt. needs lifting or lowering, needs assist with 2 legs during transfer (3)  []Pt. needs lifting and lowering (2)  []Pt. needs assist of 2 helpers, helper pushes shower chair on wheels in  and out of  shower (1)

## 2017-05-04 NOTE — PROGRESS NOTES
Physical Therapy  Seated Endurance Group Treatment    Antonina Butler   MRN: [id number]      05/04/17 1115   PT Time Calculation   PT Start Time 1115   PT Stop Time 1200   PT Total Time (min) 45 min   Treatment   Treatment Type Treatment   PT/PTA PT   General   PT Received On 05/04/17   Missed Time Reason Not applicable   Family/Caregiver Present No   Patient Found (position) Seated in wheelchair   Precautions   General Precautions fall;hearing impaired   Visual/Auditory Hearing impaired   Subjective   Patient states Pt agreeable to group treatment   Pain/Comfort   Pain Rating no pain   Other Comments   Comments Patient participated in 45 minute seated high endurance group. The group activity challenged bilateral upper extremity and lower extremity strengthening. In addition, cardiovascular and functional endurance were challenged during this group.    Patient completed the following upper extremity exercises for 3 sets of 20 reps: bicep curls, bilateral and unilateral overhead reaching, side punches, T's, K's, arm circles (low, medium, high), shoulder shrugs    Patient completed the following lower extremity exercises for 3 sets of 20 reps: hip flexion, knee extension, toe and heel taps (5x20), hip adduction with theraball, kicks with side kick combo, side kicks    Patient also performed wheelchair pushups (2x15) and side leans x 20 reps after PT educated patients about proper pressure relief techniques.      Pt required 0 rest breaks during therapeutic exercises. These activities were performed in a group setting to encourage participation with peers and social interaction skills.   After Treatment   Patient Position After Treatment Seated in wheelchair   Patient after treatment left call button in reach   Plan   Planned Therapy Intervention Continue with current plan   Therapy Frequency 2 times/day;Monday-Friday;Saturday or Sunday   Physical Therapy Follow-up   PT Follow-up? Yes   PT - Next Visit Date 05/05/17    Treatment/Billable Minutes   Therapeutic Activity Group 45   Total Time 45   Nay Torres DPT  5/4/2017

## 2017-05-05 PROCEDURE — 25000003 PHARM REV CODE 250: Performed by: HOSPITALIST

## 2017-05-05 PROCEDURE — 97112 NEUROMUSCULAR REEDUCATION: CPT

## 2017-05-05 PROCEDURE — 97110 THERAPEUTIC EXERCISES: CPT

## 2017-05-05 PROCEDURE — 97116 GAIT TRAINING THERAPY: CPT

## 2017-05-05 PROCEDURE — 25000003 PHARM REV CODE 250: Performed by: PHYSICAL MEDICINE & REHABILITATION

## 2017-05-05 PROCEDURE — 97150 GROUP THERAPEUTIC PROCEDURES: CPT

## 2017-05-05 PROCEDURE — 63600175 PHARM REV CODE 636 W HCPCS: Performed by: HOSPITALIST

## 2017-05-05 PROCEDURE — 92507 TX SP LANG VOICE COMM INDIV: CPT

## 2017-05-05 PROCEDURE — 99233 SBSQ HOSP IP/OBS HIGH 50: CPT | Mod: ,,, | Performed by: PHYSICAL MEDICINE & REHABILITATION

## 2017-05-05 PROCEDURE — 12800000 HC REHAB SEMI-PRIVATE ROOM

## 2017-05-05 PROCEDURE — 97530 THERAPEUTIC ACTIVITIES: CPT

## 2017-05-05 RX ADMIN — ASPIRIN 81 MG: 81 TABLET, COATED ORAL at 08:05

## 2017-05-05 RX ADMIN — DOCUSATE SODIUM 100 MG: 100 CAPSULE, LIQUID FILLED ORAL at 08:05

## 2017-05-05 RX ADMIN — ATORVASTATIN CALCIUM 40 MG: 20 TABLET, FILM COATED ORAL at 08:05

## 2017-05-05 RX ADMIN — AMLODIPINE BESYLATE 5 MG: 5 TABLET ORAL at 08:05

## 2017-05-05 RX ADMIN — ENOXAPARIN SODIUM 40 MG: 100 INJECTION SUBCUTANEOUS at 06:05

## 2017-05-05 NOTE — PROGRESS NOTES
Physical Therapy   FIM scores    Antonina Butler   MRN: [id number]                05/05/17 1605   Transfers   Bed/Chair/WC 4   Locomotion   Distance Walked 2   Distance Wheelchair 3   Walk 2   Wheelchair 5   Mode C   Stairs 0           Kelvin Sung, PT 5/5/2017

## 2017-05-05 NOTE — PROGRESS NOTES
BLADDER  [x] Pt. uses toilet (7)  [] Pt. is on dialysis - does not urinate (7)  [] Pt. uses bedside commode (5)  []Pt. uses bedpan - staff does___% (includes rolling on/off, holding bedpan in place                                                                   and emptying pan)   [] Pt. uses urinal - staff empties                          []   Pt. needs help with positioning the urinal (4)                          []   Pt. needs help holding the urinal (2)  []  Pt. has momin catheter/suprapubic catheter - staff empties (1)  []  Pt. Is on ICP program:                           []  Pt. does catheterization (6)                           [] Staff does catheterization (1)  [] Pt. Is incontinent - staff does ______% of tasks  Number of accidents during this shift _____    BOWEL  [] Pt. uses toilet (7)  [] Pt. uses bedside commode (5)  [] Pt. uses bedpan - staff does _____% of task  [] Pt. is on bowel program::                         []   Pt. inserts suppository (6)                         []   Nurse inserts suppository (4)                         []  Nurse does dig. stim. (1)  [] Pt. has colostomy - staff maintains care and empties (1)                       Pt. does _____% of care  [] Pt. is incontinent - staff does _____% of tasks  [x] No bowel movement during this shift  [] Number of accidents during this shift ____    EATING  [x] Pt. opens packages, cuts food, pours liquids, and feeds self, regular consistency (7)  [] Pt. needs dentures, swallow technique, special foods or drink consistency (6)  [] Pt. needs supervision (cueing), setup (open containers, cut food, etc.) (5)  []Pt. needs assist with occasional scooping, or checking for food pocketing (75-90%) (4)  []Pt. needs assist to lead each bite on utensils, patient brings food to mouth (50-74%)(3)  []Pt. needs assist to scoop, bring food to mouth (hand over hand) (25-49%) (2)  []AxillaryPt. Is receiving IV fluids for hydration or tube feeding  (1)    GROOMING  []Pt. performs all tasks independently and safely (7)  []Pt. obtains all articles, needs adaptive/assistive device (wash mitt, etc.) (6)  []Pt. needs supervision (cueing), setup (5)  []Pt. doing 3 of 4 or 4 of 5 tasks, needs assist to put in or remove dentures, steadying (Patient doing 75-90%) (4)  []Pt. doing 2 of 4 or 3 of 5 tasks (3)  []Pt. doing 1 of 4 of 2 of 5 tasks (25-49%) (2)  []Pt. doing 0 of tasks, needs assistance of 2 helpers (1)  []Activity occurred with OT    BATHING  []Pt. safety bathes whole body (10 parts of 10) (7)  []Pt. doing 10 of 10 body parts, uses adaptive devices (wash mitt, etc.) (6)  []Pt. doing 10 of 10 body parts or needs supervision or setup (5)  []Pt. doing 8-9 of 10 body parts (75-99%) (4)  []Pt. doing 5-7 of 10 body parts (50-74%) (3)  []Pt. doing 3-4 of 10 body parts (25-49%( (2)  []Pt. doing 0-2 of 10 body parts (0-24%0 (1)  []Activity occurred with OT    DRESSING UPPER BODY  []Pt. dresses or undresses independently, obtaining clothes from storage area (7)  []Pt. needs adaptive devices (6)  []Pt. needs supervision (cueing), setup (5)  []Pt. doing 75-99% (4)  []Pt. doing 50-74% (3)  []Pt. doing 25-49% (2)  []Pt. doing 0-24% or needs assist of 2 helpers (1)  []Activity occurred with OT    DRESSING LOWER BODY  []Pt. dresses or undresses independently (7)  []Pt. needs adaptive devices (6)  []Pt. needs supervision (cueing), set up helper applies EVAN hose or AFO (5)  []Pt. doing 75-99%, needs steadying assist, fastening a belt, etc.) (4)  []Pt. doing 50-74% (3)  []Pt. doing 25-49% (2)  []Pt. doing 0-24% or needs assist of 2 helpers (1)    TOILETING  []Pt. doing 3 of 3 tasks (pulling down pants, cleaning yanet area, pulling up pants) (7)  []Pt. doing all 3 parts but needs assistive device (grab bar) (6)  [x]Pt. needs supervision or setup (5)  []Pt. doing 3 of 3 parts (75-99%) (4)  []Pt. doing 2 of 3 parts (50-74%) (3)  []Pt. doing 1 of 3 parts (25.49%) (2)  []Pt. needs  assist (more than steadying) with all 3 parts or needs assist of 2 helpers,  Incontinent of B/B today (0-24%) (1)    BED/ CHAIR/WHEELCHAIR TRANSFER  []Pt. transfers without assistive device into and out of wheelchair/bed/chair (7)  []Pt. uses assistive/adaptive devices (grab bars, sliding board, walker, bed rails,   wheelchair arm rests, etc.) (6)  [x]Pt. needs supervision, cues, head of bed raised by staff (5)  []Pt. needs steadying assist with any or all parts of transfer, needs assist with one   leg during transfer ( 4)  []Pt. needs lifting or lowering, needs assist with 2 legs during transfer (3)  []Pt. needs lifting and lowering (2)  []Pt. needs assist of 2 helpers(even if 2nd person is just there for safety) or mechanical lift (1)  []Activity did not occur     TOILET TRANSFER  []Pt. transfers from wheelchair or walking without assistive device (7)  []Pt. uses assistive/adaptive device (commode, grab bars, sliding board, walker,   prosthesis, orthotic, raised toilet seat, etc.) (6)  [x]Pt. needs supervision, cues, or setup (needs assist to lock brakes, remove leg or arm  rest, position sliding board) (5)  []Pt. needs steadying assist with any or all parts of transfer, needs assist with one leg  during transfer (4)  []Pt. needs lifting or lowering, needs assist with two legs during transfer (3)  []Pt. needs lifting and lowering (2)  []Pt. needs assist of 2 helpers or mechanical lift (1)  []Activity did not occur    SHOWER TRANSFER  []Pt. transfers without assistive device into and out of shower (7)  []Pt. uses assistive/adaptive device (shower bench, grab bars, etc.) (6)  []Pt. needs supervision, cues, or setup (5)  []Pt. needs steadying assist with any or all parts of transfer, needs assist with one leg  during transfer (4)  []Pt. needs lifting or lowering, needs assist with 2 legs during transfer (3)  []Pt. needs lifting and lowering (2)  []Pt. needs assist of 2 helpers, helper pushes shower chair on wheels in  and out of  shower (1)

## 2017-05-05 NOTE — PROGRESS NOTES
Occupational Therapy   FIM Scores    Antonina Butler   MRN: [id number]      05/05/17 1345   Transfers   Bed/Chair/WC 4   Self Care   Dressing-Upper 5   Toileting 4     BRI Gomez   5/5/2017

## 2017-05-05 NOTE — PROGRESS NOTES
Occupational Therapy     Antonina Butler   MRN: [id number]     A client care conference was performed between the LOTR and JACKSON, prior to treatment by MANUEL, to discuss the patient's status, treatment plan and established goals.      05/05/17 1115   OT Time Calculation   OT Start Time 1115   OT Stop Time 1200   OT Total Time (min) 45 min   General   OT Date of Treatment 05/05/17   Patient Found (position) Seated in wheelchair   Treatment/Billable Minutes   Therapeutic Group 45   Total Time 45     Patient participated in 45-60 minute volleyball group activity.  The group activity challenged dynamic standing/sitting skills, bilateral upper extremity strengthening, cardiovascular and respiratory capacity, hand -eye coordination, visual scanning and social affect/mood.  The patient performed this activity at w/c level withSBA____assist and .  Patient tolerated sitting activity for ___45___ minutes with__SBA_ assist. _The patient required _short__ rest breaks during this activity.  Patient performed activity using bilateral upper extremities to volley the ball, lateral arm movement noted throughout the activity.  Endurance _good  __, no pain complaints voiced are observed , social affect good as patient was observed throughout this activity ie., appropriately engaged in social exchange with peers and staff.    Charles Brooks, PAU 5/5/2017

## 2017-05-05 NOTE — PROGRESS NOTES
Admit Date: 5/2/2017   LOS: 3 days     HPI, PFSH reviewed -- No change from admission.    Interval History/ROS: No acute events over night. Tolerating therapy, denied pain or discomfort. Slept well over night. Discussed in team conference. Please see note for details.   - No shortness of breath, chest pain, nausea, or vomiting      Functional: Min/Mod for bed mobility, Min A for sit to stand, Min/Mod for transfers, Mod I w eating, Supervision w grooming, Min A for UBD, toileting  Cog: Sup for memory, prob solving, Mod I with comp/Exp     Physical Exam:  Vital Signs Range (Last 24H):  Temp:  [97.8 °F (36.6 °C)]   Pulse:  [68]   Resp:  [16]   BP: (129)/(69)   SpO2:  [97 %]   General Appearance:  In no acute distress and comfortable, sitting EOM working w PT    - Vital Signs reviewed  Neuro/Cognition: Alert, Awake, Oriented to person, place, situation    - Followed all commands   Psych/Affect: Appropriate   Cardiac: Normal rate   Pulmonary: No labored breathing, on RA   Abdomen: soft, non-tender, and not distended     : Voiding spontaneously, No momin   Extremities: No calf tenderness, no local swelling   Skin: Intact, Warm  Musculoskeletal: LUE/LLE WFL  RUE: SA 5/5, EF, EE,  5/5  RLE HF 3/5, KE 3/5, DF/PF 5/5    Scheduled Medications:   amlodipine  5 mg Oral Daily    aspirin  81 mg Oral Daily    atorvastatin  40 mg Oral Daily    docusate sodium  100 mg Oral BID    enoxaparin  40 mg Subcutaneous Daily       PRN Medications:   acetaminophen, bisacodyl, ondansetron    Review of patient's allergies indicates:  No Known Allergies    Recent Laboratory Results:  Lab Results   Component Value Date    CREATININE 0.9 05/03/2017    BUN 17 05/03/2017     05/03/2017    K 4.0 05/03/2017     05/03/2017    CO2 31 (H) 05/03/2017     Lab Results   Component Value Date    WBC 4.74 05/03/2017    HGB 13.1 05/03/2017    HCT 40.7 05/03/2017    MCV 94 05/03/2017     05/03/2017     No results found for:  PTT      Evaluation:   Antonina Butler is a 71 y.o. female admitted to inpatient rehabilitation on 5/2/2017 for stroke with impaired mobility and ADLs. Patient remains appropriate for PT, OT, and as required Speech therapy. Patient continues to require 24 hour nursing care as well as daily Physician assessment.     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, mild cog deficits, dysphagia   - cont w ASA/statin   - F/U with Neuro as OP   - cont w PT, OT, Speech      HTN  - cont w Amlodipine daily  - monitor BP/HR closely as there can be fluctuation with initiation of therapy   5/5 VSS, cont w current mgmt      Pain: Will monitor closely and adjust as necessary   - Tylenol 650mg Q6 hrs PRN     Monitoring:    Nutrition/Swallow Status: Dental Soft Liquid Diet Level: Thin Feed   - Prealbumin -   22  - Nutritionist on board   Bladder Management: continent  Bowel Management: continent  - Colace BID and Suppository PRN   - Will monitor for regularity   Mood: stable   Sleep: monitor; Will consider sleep aid as clinically indicated   Skin: Monitor   DVT ppx: Lovenox 40mg daily     Discharge Planning:  Tentative Discharge Date: 5/16  Tentative DC location: Home with Family     No future appointments.      Arlene Ramirez MD  [facility] [place]

## 2017-05-05 NOTE — PROGRESS NOTES
"Speech Therapy   Treatment    Antonina Butler   MRN: [id number]          05/05/17 1000   Speech Time Calculation   Speech Start Time 0758   Speech Stop Time 0844   Speech Total (min) 46 min   General Information   SLP Treatment Date 05/05/17   General Observations Patient seen while sitting upright in w/c in ST office.   General Precautions fall   Visual/Auditory Vision impaired   Subjective   Patient states "My short term memory isn't very good."   Pain/Comfort   Pain Rating no pain       SLP Cognitive Treatment   Treatment Detail (SLP Cognitive Treatment) Oriented x4 independently.  Patient completed 4-step sequencing task with 100% accy independently. When asked to recall recent events, patient recalled stroke education in previous session, previous day's visitors. Patient demonstrated difficulty recalling previous night's dinner. ST encouraged pt to write down important notes/dates to which patient stated that she already does write things down.  Patient completed log[employer] problem solving task with 100% accy independently. In a math/financial management task, patient answered questions with 70% accy independently.  Patient completed functional recall task of reading material with 60% accy given moderate verbal cues. ST encouraged pt to use listening strategies during task to increase information retention, pt agreeable.   Assessment   SLP Diagnosis milg cognitive deficits   Prognosis Good   Problem List decreased attention; decreased recall; decreased math/money skills   Session Assessment progressing toward goals   Level of Motivation/Participation Good   Discharge Recommendations   Discharge Facility/Level Of Care Needs outpatient speech therapy   Plan   Plan Continue with current plan   Therapy Frequency Monday-Friday   SLP Follow-up   SLP Follow-up? Yes   Treatment/Billable Minutes   Speech Therapy Individual 46   Total Time 46     FIM Scores  Comprehension:6  Expression: 6  Social " Interaction:6  Problem Solvin  Memory: 5      Comprehension: Complex= humor, finances, rationale for medical treatment(hip precautions, pressure relief)  Basic= pain, hunger, thirst, bathroom needs, cold, nutrition, sleep      Understands complex/abstract conversation/directions with extra time, assistance device(glasses, if visual mode or both; hearing aide if auditory mode or both) (6)      Expression:  Expresses complex / abstract ideas with extra time, assistive devic (augmentative communication device (6)      Social Interaction:  Interacts appropriately with others with medication or extra time(anti-anxiety, antidepressant) (6)      Problem Solving:  Solves complex problems with cues (5)      Memory:  Recognizes, recalls, or executes 3 steps of 3 step request 90% of time (cueing, reminders<10%, loses track of time) (5)        Yin Archer CF-SLP  2017

## 2017-05-05 NOTE — PROGRESS NOTES
Pt discussed during treatment team staffing.  Expected discharge date is 5/16/17.  Pt informed and agrees with discharge date.

## 2017-05-05 NOTE — PROGRESS NOTES
"Speech Therapy   Treatment    Antonina Butler   MRN: [id number]        17 1300   Speech Time Calculation   Speech Start Time 1300   Speech Stop Time 1345   Speech Total (min) 45 min   General Information   SLP Treatment Date 17   General Observations Pt seen individually in ST office.   General Precautions fall;hearing impaired   Visual/Auditory Hearing impaired;Vision impaired  (glasses)   Subjective   Patient states Pt stated "This was very informative."   Pain/Comfort   Pain Rating no pain       SLP Cognitive Treatment   Treatment Detail (SLP Cognitive Treatment) Pt engaged in CVA education utilizing Moving Forward book with pt reading aloud. Information provided regarding signs/symptoms of CVA, types of CVA, incidence of CVA, therapies following CVA onset, controllable factors for prevention of CVA, and caregiver information. Pt with appropriate questions asked for information read aloud; however, required multiple attempts at reading information for comprehension of information. Pt completed recall task of therapies completed earlier today given moderate verbal cues with 60% acc. Writing task completed with 100% legible information. Pt also completed tracing task with noted increased accuracy of hand control. Pt reports increasing skills each session.   Assessment   SLP Diagnosis mild cognitive deficits   Prognosis Good   Problem List decreased attention skills; poor safety awareness; decreased processing skills   Session Assessment progressing toward goals   Level of Motivation/Participation good   Discharge Recommendations   Discharge Facility/Level Of Care Needs outpatient speech therapy   Plan   Plan Continue with current plan   Therapy Frequency Monday-Friday   SLP Follow-up   SLP Follow-up? Yes   Treatment/Billable Minutes   Speech Therapy Individual 45   Total Time 45     FIM Scores  Comprehension:6  Expression: 6  Social Interaction:6  Problem Solvin  Memory: 5     Comprehension: Complex= " humor, finances, rationale for medical treatment(hip precautions, pressure relief)  Basic= pain, hunger, thirst, bathroom needs, cold, nutrition, sleep     Understands complex/abstract conversation/directions with extra time, assistance device(glasses, if visual mode or both; hearing aide if auditory mode or both) (6)     Expression:  Expresses complex / abstract ideas with extra time, assistive devic (augmentative communication device (6)     Social Interaction:  Interacts appropriately with others with medication or extra time(anti-anxiety, antidepressant) (6)     Problem Solving:  Solves complex problems with cues (5)     Memory:  Recognizes, recalls, or executes 3 steps of 3 step request 90% of time (cueing, reminders<10%, loses track of time) (5)       Stefany Fagan CCC-SLP  5/4/2017

## 2017-05-05 NOTE — PROGRESS NOTES
"Physical Therapy   Treatment    Antonina Butler   MRN: [id number]              05/05/17 0945   PT Time Calculation   PT Start Time 0945   PT Stop Time 1030   PT Total Time (min) 45 min   Treatment   Treatment Type Treatment   PT/PTA PT   General   PT Received On 05/05/17   Family/Caregiver Present No   Patient Found (position) Seated in wheelchair;Other (comment)  (in room)   Pt found with (posey belt)   Precautions   General Precautions fall;hearing impaired;vision impaired   Visual/Auditory Vision impaired;Hearing impaired   Subjective   Patient states " Hi."   Pain/Comfort   Pain Rating no pain   Pain Rating Post-Intervention no pain   Bed Mobility   Bed Mobility no   Transfers   Transfer yes   Sit to Stand   Sit <> Stand Assistance Contact Guard Assistance   Sit <> Stand Assistive Device No Assistive Device   Trials/Comments multiple trials from mat, working on proper sequence and timing   Stand to Sit   Assistance Contact Guard Assistance   Assistive Device No Assistive Device   Trials/Comments multiple trials from mat, working on proper sequence and timing   Chair to Mat   Chair<> Mat Technique Stand Pivot   Chair<>Mat Assistance Contact Guard Assistance   Chair <> Mat Assistive Device No Assistive Device   Trials/Comments 1 trial   Mat to Chair   Technique Stand Pivot   Assistance Contact Guard Assistance   Assistive Device No Assistive Device   Trials/Comments 1 trial   Wheelchair Activities   Propulsion No   Propulsion Type 1 Manual   Level 1 Level tile   Method 1 Right upper extremity;Left upper extremity   Level of Assistance 1 Supervision   Description/ Details 1 pt propels w/c x 150 feet   Gait   Gait Yes   Weight Bearing Status full   Gait 1   Surface 1 Level tile   Gait Assistive Device Rolling walker   Assistance 1 Minimum assistance  (PT guards carefully against R knee hyperextension)   Gait Distance 119 feet   Gait Pattern swing-to gait  (occasional reciprocal pattern)   Gait Deviation(s) decreased " "duran;increased time in double stance;decreased velocity of limb motion;decreased step length;decreased stride length;decreased swing-to-stance ratio;decreased toe-to-floor clearance;decreased weight-shifting ability;excessive knee flexion;knee hyperextension;forward lean;other (see comments)  (pt tends to flex head and look down at feet)   Impairments Contributing to Gait Deviations impaired balance;impaired motor control;impaired postural control;decreased strength;abnormal muscle tone;other (see comments)  (decreased cognition ( ability to carry out some cues))   Stairs   Stairs No   Balance   Balance Yes   Dynamic Standing Balance   Dynamic Standing-Balance Support No upper extremity supported   Dynamic Standing-Balance (side stepping along mat)   Dynamic Standing-Comments pt performs several trials of side-stepping near edge of mat with PT facilitating from side and slightly behind~ PT also places mirror before pt to assist with visualization of posture and weight shift   Other Activities   Other Activities Other (Comment)  (PT has pt practice placing L foot on 2 and 4 " steps)   Comments pt performs above in parallel bars to improve R LE weight acceptance  (PT guards against R knee hyperextension/excessive flexion)   Activity Tolerance   Activity Tolerance Patient tolerated treatment well   After Treatment   Patient Position After Treatment Seated in wheelchair;Other (comment)  (posey belt in place)   Patient after treatment left (pt propels to room from OT side of gym)   Assessment   Prognosis Good   Problem List Decreased strength;Decreased endurance;Impaired balance;Decreased mobility;Decreased cognition;Impaired hearing;Impaired vision;Decreased safety awareness   Session Assessment progressing toward goals   Assessment Pt continues to do well with sit<> stand and stand pivot transfers.  PT continues to focus heavily on improving weight acceptance to R LE, as this is one of pt's primary problems.  Pt " also limited by decreased cognition/memory and often has difficulty remembering previously taught information.  Pt remains appropriate for inpatient rehab setting.   Level of Motivation/Participation good   Barriers to Discharge None   Barriers to Discharge Comments  can assist at time of discharge   Discharge Recommendations   Equipment Needed After Discharge 3-in-1 commode;walker, rolling;shower chair   Discharge Facility/Level Of Care Needs outpatient PT   Plan   Planned Therapy Intervention Continue with current plan;Bed mobility training;Transfer training;Gait training;Balance Training;ROM;Strengthening;Neuromuscular Re-education;Motor Coordination Training;Postural Re-education;Wheelchair Management/Propulsion   Therapy Frequency 2 times/day;daily;Monday-Friday;Saturday or Sunday   Physical Therapy Follow-up   PT Follow-up? Yes   PT - Next Visit Date 05/06/17   Treatment/Billable Minutes   Gait training 10   Therapeutic Activity 15   Neuromuscular Re-education 20   Total Time 45           Kelvin Sung, PT 5/5/2017

## 2017-05-05 NOTE — PROGRESS NOTES
Admit Assessment    Patient Identification  Antonina Butler   :  1945  Admit Date:  2017  Attending Provider:  Arlene Ramirez MD                EXPECTED DATE OF DISCHARGE:  To be determined at first treatment team staffing.     Pt was admitted to inpatient rehab.   is involved.    NAME OF PERSON PROVIDING INFORMATION FOR ASSESSMENT:  Patient    EMERGENCY CONTACT:  Primary Contact: Mitch Torres                              Mobile Phone: [phone]                Relation: Spouse    LIVING ARRANGEMENTS: [address] [place] [address] [place] [address].  Pt lives with spouse and grandchildren      LEVEL OF INDEPENDENCE/ASSISTANCE REQUIRED PRIOR TO ADMISSION:  Independent      HOME HEALTH OR OUTPATIENT THERAPY USAGE PRIOR TO ADMISSION:  None      DURABLE MEDICAL EQUIPMENT: Cane      Patient Preference of agencies include: None stated  Patient/Caregiver informed of right to choose providers or agencies.  Patient provides permission to release any necessary information to [facility] and to Non-[facility] agencies as needed to facilitate patient care, treatment planning, and patient discharge planning.  Written and verbal resources provided.      Outpatient Pharmacy:     [id number] - [place] - [address] AT [place] [address]  [address]  [place] [address]  Phone: [phone] Fax: [phone]      FAMILY/CAREGIVER SUPPORT:  Pt's spouse is primary caregiver.       LEVEL OF ORIENTATION:  AA&Ox4      ADJUSTMENT TO DIAGNOSIS AND TREATMENT:  Pt adjusting appropriately      EMOTIONAL/BEHAVIORAL STATUS/COGNITIVE ISSUES:  Pt presented with calm mood, good recall, judgement and concentration.  Pt asked and answered questions appropriately.         History/Current Substance Use:   Social History     Social History Main Topics    Smoking status: Current Every Day Smoker     Packs/day: 0.50     Years: 15.00    Smokeless tobacco: Not on file    Alcohol use No     Drug use: No    Sexual activity: Not on file         Financial:  Payor/Plan Subscr  Sex Relation Sub. Ins. ID Effective Group Num   1. [employer] MANAGE* MARITZA DANIELLE* 1945 Female  [id number]                                   [address] [address]          DISCHARGE PLAN:  Home with family      PATIENT PREFERENCE OF AGENCIES:  None stated      PATIENT/CAREGIVER CONCERNS EXPRESSED DURING ASSESSMENT:  None       INTERVENTIONS/REFERRALS:  Pt caregiver engaged in treatment planning process. SW will continue to follow for all resources, education, discharge planning and psychosocial needs.  Patient/caregiver engaged in treatment planning process.  SW available as needed.

## 2017-05-05 NOTE — PROGRESS NOTES
"Occupational Therapy   PM Treatment    Antonina Butler   MRN: [id number]      05/05/17 1345   OT Time Calculation   OT Start Time 1345   OT Stop Time 1431   OT Total Time (min) 46 min   General   OT Date of Treatment 05/05/17   Precautions   General Precautions fall   Visual/Auditory Vision impaired   Subjective   Patient states "This knee is weak"   Pain/Comfort   Pain Rating no pain   Bed Mobility   Supine to Sit Stand by Assistance   Supine to Sit Comments from EOM   Sit to Supine Supervision   Sit to Supine Comments to EOM   Transfers   Transfer yes   Chair to Mat   Chair <>Mat Technique Stand Pivot   Chair <> Mat Assistance Minimum Assistance   Chair<> Mat Assistive Device No Assistive Device   Mat to Chair   Technique Scoot Pivot   Assistance Minimum Assistance   Assistive Device No Assistive Device   UE Dressing   UE Dressing Level of Assistance Set-up Assistance   UE Dressing Where Assessed (seated at EOM)   UE Dressing Comments to don/doff jacket   Toileting   Toileting Level of Assistance Contact guard   Toileting Where Assessed Toilet   Toileting Comments steadying assist only, pt manages clothing up and down   Additional Activities   Additional Activities Other (Comment)   Additional Activities Comments  - Pt completed several sit-stand trials from EOM with facilitation into RLE and pelvis to encourage upright posture in standing. Pt still distractible at times (looking down at feet) and then losing balance.     - Weightshifting activities in standing at EOM     - Cognitive Re-training using [place]iones BITs:       -Visual scanning completed with 100% accuracy       - Sequencing completed with 100% accuracy       - Memory testing completed with 100% accuracy     - Bridging in supine on mat x 20 reps using BLEs. Single leg bridges in supine x 10 reps with assist to facilitate push through RLE.   Activity Tolerance   Activity Tolerance Patient tolerated treatment well   Medical Staff Made Aware NSG   After " Treatment   Patient Position After Treatment Seated in wheelchair   Patient after treatment left call button in reach   Assessment   Prognosis Good   Problem List Decreased Self Care skills;Decreased upper extremity strength;Decreased safe judgment during ADL;Decreased cognition;Decreased endurance;Decreased functional mobility;Decreased IADLs;Decreased trunk control for functional activities   Assessment Pt with less episodes of knee buckling with static standing activities today, but still requires frequent cues for upright posture and neutral pelvis aligment. Pt still demo mild cognitive deficits and difficulty processing and remembering instructions at times despite performing well with cognitive testing today.    Level of Motivation/Participation good   Discharge Recommendations   Equipment Needed After Discharge 3-in-1 commode;walker, rolling;shower chair   Discharge Facility/Level Of Care Needs outpatient OT   Plan   Plan Continue with current plan   Therapy Frequency 2 times/day;Monday-Friday;Saturday or Sunday   Occupational Therapy Follow-up   OT Follow-up? Yes   Treatment/Billable Minutes       Therapeutic Exercise 16   Neuromuscular Re-ed 30   Total Time 46     BRI Gomez   5/5/2017

## 2017-05-06 PROCEDURE — 97535 SELF CARE MNGMENT TRAINING: CPT

## 2017-05-06 PROCEDURE — 99232 SBSQ HOSP IP/OBS MODERATE 35: CPT | Mod: ,,, | Performed by: PHYSICAL MEDICINE & REHABILITATION

## 2017-05-06 PROCEDURE — 25000003 PHARM REV CODE 250: Performed by: HOSPITALIST

## 2017-05-06 PROCEDURE — 92507 TX SP LANG VOICE COMM INDIV: CPT

## 2017-05-06 PROCEDURE — 97110 THERAPEUTIC EXERCISES: CPT

## 2017-05-06 PROCEDURE — 97112 NEUROMUSCULAR REEDUCATION: CPT

## 2017-05-06 PROCEDURE — 97150 GROUP THERAPEUTIC PROCEDURES: CPT

## 2017-05-06 PROCEDURE — 97530 THERAPEUTIC ACTIVITIES: CPT

## 2017-05-06 PROCEDURE — 63600175 PHARM REV CODE 636 W HCPCS: Performed by: HOSPITALIST

## 2017-05-06 PROCEDURE — 25000003 PHARM REV CODE 250: Performed by: PHYSICAL MEDICINE & REHABILITATION

## 2017-05-06 PROCEDURE — 12800000 HC REHAB SEMI-PRIVATE ROOM

## 2017-05-06 PROCEDURE — 97116 GAIT TRAINING THERAPY: CPT

## 2017-05-06 RX ADMIN — ASPIRIN 81 MG: 81 TABLET, COATED ORAL at 09:05

## 2017-05-06 RX ADMIN — DOCUSATE SODIUM 100 MG: 100 CAPSULE, LIQUID FILLED ORAL at 09:05

## 2017-05-06 RX ADMIN — ATORVASTATIN CALCIUM 40 MG: 20 TABLET, FILM COATED ORAL at 09:05

## 2017-05-06 RX ADMIN — AMLODIPINE BESYLATE 5 MG: 5 TABLET ORAL at 09:05

## 2017-05-06 RX ADMIN — ENOXAPARIN SODIUM 40 MG: 100 INJECTION SUBCUTANEOUS at 06:05

## 2017-05-06 RX ADMIN — DOCUSATE SODIUM 100 MG: 100 CAPSULE, LIQUID FILLED ORAL at 08:05

## 2017-05-06 NOTE — PROGRESS NOTES
Physical Therapy  Weekend Treatment  Antonina Butler   MRN: [id number]   Room/Bed: E261/E261 A     05/06/17 1200   PT Time Calculation   PT Start Time 1002   PT Stop Time 1046   PT Total Time (min) 44 min   Treatment   Treatment Type Treatment   PT/PTA PTA   PTA Visit Number 1   General   PT Received On 05/06/17   Missed Time Reason Not applicable   Family/Caregiver Present No   Patient Found (position) Seated in wheelchair   Precautions   General Precautions fall   Visual/Auditory Vision impaired   Subjective   Patient states I am feeling much better this morning    Pain/Comfort   Pain Rating no pain   Pain Rating Post-Intervention no pain   Bed Mobility   Bed Mobility no   Sit to Stand   Sit <> Stand Assistance Contact Guard Assistance   Sit <> Stand Assistive Device No Assistive Device   Trials/Comments multiple trials    Stand to Sit   Assistance Contact Guard Assistance   Assistive Device No Assistive Device   Trials/Comments multiple trials   Chair to Mat   Chair<> Mat Technique Stand Pivot   Chair<>Mat Assistance Contact Guard Assistance   Chair <> Mat Assistive Device No Assistive Device   Trials/Comments x 1    Mat to Chair   Technique Stand Pivot   Assistance Contact Guard Assistance   Assistive Device No Assistive Device   Trials/Comments x 1    Wheelchair Activities   Propulsion Yes   Propulsion Type 1 Manual   Level 1 Level tile   Method 1 Right upper extremity;Left upper extremity   Level of Assistance 1 Supervision   Description/ Details 1 propells w/c to PT ~ 60 feet x 1    Gait   Gait Yes   Weight Bearing Status FWB   Gait 1   Surface 1 Level tile   Gait Assistive Device Rolling walker   Assistance 1 Minimum assistance   Gait Distance ~120 feet x 1 with assistenace at R knee 2/2 problematic knee control /hyperextension    Gait Pattern swing-to gait   Gait Deviation(s) decreased duran;decreased velocity of limb motion;decreased stride length;decreased swing-to-stance ratio;decreased toe-to-floor  "clearance;excessive knee flexion   Impairments Contributing to Gait Deviations impaired balance;impaired motor control;abnormal muscle tone;decreased strength   Stairs   Stairs No   Static Sitting Balance   Static Sitting-Balance Support Right upper extremity supported;Left upper extremity supported   Static Sitting-Level of Assistance Supervision   Static Sitting-Comment/# of Minutes able to sit EOM unsupported post several minutes    Static Standing Balance   Static Standing-Balance Support No upper extremity supported   Static Standing-Level of Assistance Contact guard   Static Standing-Comment/# of Minutes standing with weight shift with emhasis on knee control    Dynamic Standing Balance   Dynamic Standing-Balance Support No upper extremity supported   Dynamic Standing-Comments pt performed Delma's "pawing" technique 10 x 2 while standing with RW    Activity Tolerance   Activity Tolerance Patient tolerated treatment well   After Treatment   Patient Position After Treatment Seated in wheelchair   Patient after treatment left call button in reach  ( in room with all needs at hand )   Assessment   Prognosis Good   Problem List Decreased strength;Decreased endurance;Impaired balance;Decreased mobility;Impaired vision;Decreased coordination   Session Assessment progressing toward goals   Assessment Mrs Butler tejas the treatment well Mild decreased tone persists as does decreased standing balance and R knee control and foot placement issues which increases  with increase with fatigue    Level of Motivation/Participation good   Barriers to Discharge None   Discharge Recommendations   Equipment Needed After Discharge 3-in-1 commode;walker, rolling;shower chair   Discharge Facility/Level Of Care Needs outpatient PT   Plan   Planned Therapy Intervention Continue with current plan   Therapy Frequency 2 times/day;Monday-Friday;Saturday or Sunday   Physical Therapy Follow-up   PT Follow-up? Yes   PT - Next Visit Date " 05/08/17   Treatment/Billable Minutes   Gait training 10   Therapeutic Activity 10   Neuromuscular Re-education 14   Total Time 44       Eric Winters, PTA  5/6/2017    LEGEND:   CGA: Contact Guard Assist   EOB: Edgeof Bed   HHA: Hand Held Assist   HOB: Head of Bed   (I): Independent-patient performs task in a timely manner   Max (A): Maximal Assist-patient performs 25-49% of task   Min (A): Minimal Assist- patient performs 75% or more of task   Mod (A): Moderate Assist- patient performs 50-74% of task   NA: Not applicable   NT: Not tested   OOB: Out of Bed   PTA: Prior to admit   QC: Quad Cane   RW: Rolling Walker   (S): Supervision- patient requires cues, coaxing, prompting   SBA: Stand By Assist   SC: Straight Cane   SW: Standard Walker   TBA: To be assessed   Total (A): Total Assist- patient performs less than 25% of task   WC: Wheelchair   WFL: Within Functional Limits   WNL: Within Normal Limits

## 2017-05-06 NOTE — PROGRESS NOTES
Occupational Therapy   FM Group    Antonina Butler  MRN: [id number]  Room/Bed: E261/E261 A       05/06/17 1115   OT Time Calculation   OT Start Time 1115   OT Stop Time 1200   OT Total Time (min) 45 min   General   OT Date of Treatment 05/06/17   Family/Caregiver Present No   Precautions   General Precautions fall   OT Therapeutic Groups   OT Therapeutic (Pt. Participated in 45 minute FM coordination Group.  The activity was performed to increase R UE strength, coordination, FM manipulation, eye-hand coordination.  Pt. Performed FM coordination activity with R hand with Grooved pegboard, lacing cards,small pegs/pegboard, tracing/prehandwriting skills, and manipulating beans in 1 hand  to inc. Functional use of R hand and coordination. Pt. Was able to complete tasks with Supervision Assist.  Pt. Demonstrated increased [place] with FM tasks.  These activities were performed in a group setting to encourage increased participation with peers and social interaction skills.    )   Treatment/Billable Minutes   Therapeutic Group 45   Total Time 45       BRI Villarreal  5/6/2017

## 2017-05-06 NOTE — PROGRESS NOTES
"Speech Therapy   Treatment    Antonina Butler   MRN: [id number]            17 1445   Speech Time Calculation   Speech Start Time 1445   Speech Stop Time 1530   Speech Total (min) 45 min   General Information   SLP Treatment Date 17   General Observations Pt seen while sitting upright in w/c in ST office.   General Precautions fall   Visual/Auditory Vision impaired   Subjective   Patient states "I'm terrible at math."   Pain/Comfort   Pain Rating no pain   Cognitive Perceptual Neuro   Level of Consciousness (AVPU) alert       SLP Cognitive Treatment   Treatment Detail (SLP Cognitive Treatment) Patient oriented x4 independently. In a cause/effect task, patient identified cause/effect to hypothetical situations with 100% accy independently. Given verbal prompt, patient recalled recent information, such as activities performed in previous ST session, visitors from previous day.  In a money managament task, pt calculated finances with 80% accy independently given extended response time. In a functional medication management task, patient organized medicine and answered medication safety questions with 100% accuracy independently. Patient stated, "I have a good relationship with my pharmacist and she lets me call her if I have any questions."  Patient demonstrated increased problem solving skills in all completed tasks as compared to most recent session.   Assessment   SLP Diagnosis milg cognitive deficits   Prognosis Good   Problem List decreased math/money skills   Session Assessment progressing toward goals   Level of Motivation/Participation good   Plan   Plan Continue with current plan   Therapy Frequency Monday-Friday   SLP Follow-up   SLP Follow-up? Yes   Treatment/Billable Minutes   Speech Therapy Individual 45   Total Time 45     FIM Scores  Comprehension:6  Expression: 6  Social Interaction:6  Problem Solvin  Memory: 5      Comprehension: Complex= humor, finances, rationale for medical " treatment(hip precautions, pressure relief)  Basic= pain, hunger, thirst, bathroom needs, cold, nutrition, sleep      Understands complex/abstract conversation/directions with extra time, assistance device(glasses, if visual mode or both; hearing aide if auditory mode or both) (6)      Expression:  Expresses complex / abstract ideas with extra time, assistive devic (augmentative communication device (6)      Social Interaction:  Interacts appropriately with others with medication or extra time(anti-anxiety, antidepressant) (6)      Problem Solving:  Solves complex problems with cues (5)      Memory:  Recognizes, recalls, or executes 3 steps of 3 step request 90% of time (cueing, reminders<10%, loses track of time) (5)           Yin Archer CF-SLP  5/6/2017

## 2017-05-06 NOTE — PROGRESS NOTES
"Occupational Therapy   Treatment    Antonina Butler   MRN: [id number]            05/06/17 1345   OT Time Calculation   OT Start Time 1345   OT Stop Time 1430   OT Total Time (min) 45 min   General   OT Date of Treatment 05/06/17   Missed Treatment Reason Not applicable   Family/Caregiver Present No   Patient Found (position) Supine in bed   Precautions   General Precautions fall   Visual/Auditory Vision impaired   Subjective   Patient states "I was going to go to the gym for you but I cannot transfer by myself"   Pain/Comfort   Pain Rating no pain   Bed Mobility   Supine to Sit Stand by Assistance   Transfers   Transfer yes   Sit to Stand   Sit <> Stand Assistance Minimum Assistance   Sit <> Stand Assistive Device No Assistive Device   Trials/Comments for safety to stabilize R knee   Stand to Sit   Assistance Contact Guard Assistance   Assistive Device No Assistive Device   Trials/Comments decreased control on descent   Bed to Chair   Bed <> Chair Technique Stand Pivot   Bed <> Chair Transfer Assistance Minimum Assistance   Bed <> Chair Assistive Device No Assistive Device   Trials/Comments for safety    Toilet Transfer   Toilet Transfer Technique Stand Pivot   Toilet Transfer Assistance Minimum Assistance   Toilet Transfer Assistive Device No Assistive Device   Trials/Comments vcs for safety; Pt attempting to manage clothing when standing in front of w/c. VCs to transfer to  front of toilet before managing clothing   Grooming   Hand Washing Level of Assistance Supervision   Grooming Where Assessed Sitting sinkside   LE Dressing   Shoe Level of Assistance Supervision   LE Dressing Where Assessed Edge of bed   LE Dressing Comments Pt donned shoes to attend therapy   Toileting   Toileting Level of Assistance Contact guard   Toileting Where Assessed Toilet   Toileting Comments for safety during clothing mgmt   Exercise Tools   Bilateral [place] 2 lbs 4 sets of 20; 0 wt 1 set of 20   Other Exercise Tool 1 2 lb " dowel--1 set of 10 various planes   Additional Activities   Additional Activities Comments Pt reaching and grasping cones in various planes with RUE.    Activity Tolerance   Activity Tolerance Patient tolerated treatment well   After Treatment   Patient Position After Treatment Seated in wheelchair   Patient after treatment left (with Speech therapist)   Assessment   Prognosis Good   Problem List Decreased Self Care skills;Decreased upper extremity range of motion;Decreased upper extremity strength;Decreased cognition;Decreased endurance;Decreased safe judgment during ADL;Decreased fine motor control;Decreased functional mobility;Decreased IADLs;Decreased Function of right upper extremity   Assessment Pt aware of RLE buckling prior to attempts to stand and transfer. Pt required short rest breaks while working BUEs. Cont OT plan of care   Level of Motivation/Participation good   Discharge Recommendations   Equipment Needed After Discharge 3-in-1 commode;walker, rolling;shower chair   Discharge Facility/Level Of Care Needs outpatient OT   Plan   Plan Continue with current plan   Therapy Frequency 2 times/day;Monday-Friday;Saturday or Sunday   Occupational Therapy Follow-up   OT Follow-up? Yes   Treatment/Billable Minutes   Self Care/Home Management 15   Therapeutic Exercise 30   Total Time 45   ALFREDO Solorio/CAN  5/6/2017

## 2017-05-06 NOTE — PROGRESS NOTES
Admit Date: 5/2/2017   LOS: 4 days     HPI, PFSH reviewed -- No change from admission.    Interval History/ROS: No acute events over night. Tolerating therapy, denied pain or discomfort. Slept well over night.   - No shortness of breath, chest pain, nausea, or vomiting      Functional: Min/Mod for bed mobility, CGA for sit to stand (improved), CGA for transfers (improved), Mod I w eating, Supervision w grooming, setup for UBD (improved), CGA toileting (improved)  Cog: Sup for memory, prob solving, Mod I with comp/Exp     Physical Exam:  Vital Signs Range (Last 24H):  Temp:  [97.9 °F (36.6 °C)]   Pulse:  [62]   Resp:  [17]   BP: (137-157)/(76-78)   SpO2:  [95 %]   General Appearance:  In no acute distress and comfortable, sitting EOM working w PT    - Vital Signs reviewed  Neuro/Cognition: Alert, Awake, Oriented to person, place, situation    - Followed all commands   Psych/Affect: Appropriate   Cardiac: Normal rate   Pulmonary: No labored breathing, on RA   Abdomen: soft, non-tender, and not distended     : Voiding spontaneously, No momin   Extremities: No calf tenderness, no local swelling   Skin: Intact, Warm  Musculoskeletal: LUE/LLE WFL  RUE: SA 5/5, EF, EE,  5/5  RLE HF 3/5, KE 3/5, DF/PF 5/5    Scheduled Medications:   amlodipine  5 mg Oral Daily    aspirin  81 mg Oral Daily    atorvastatin  40 mg Oral Daily    docusate sodium  100 mg Oral BID    enoxaparin  40 mg Subcutaneous Daily       PRN Medications:   acetaminophen, bisacodyl, ondansetron    Review of patient's allergies indicates:  No Known Allergies    Recent Laboratory Results:  Lab Results   Component Value Date    CREATININE 0.9 05/03/2017    BUN 17 05/03/2017     05/03/2017    K 4.0 05/03/2017     05/03/2017    CO2 31 (H) 05/03/2017     Lab Results   Component Value Date    WBC 4.74 05/03/2017    HGB 13.1 05/03/2017    HCT 40.7 05/03/2017    MCV 94 05/03/2017     05/03/2017     No results found for:  PTT      Evaluation:   Antonina Butler is a 71 y.o. female admitted to inpatient rehabilitation on 5/2/2017 for stroke with impaired mobility and ADLs. Patient remains appropriate for PT, OT, and as required Speech therapy. Patient continues to require 24 hour nursing care as well as daily Physician assessment.     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, mild cog deficits, dysphagia -- improving  - cont w ASA/statin   - F/U with Neuro as OP   - cont w PT, OT, Speech      HTN -- borderline control  - cont w Amlodipine daily  - monitor BP/HR closely as there can be fluctuation with initiation of therapy   5/5 VSS, cont w current mgmt      Pain: Will monitor closely and adjust as necessary   - Tylenol 650mg Q6 hrs PRN     Monitoring:    Nutrition/Swallow Status: Dental Soft Liquid Diet Level: Thin Feed   - Prealbumin -   22  - Nutritionist on board   Bladder Management: continent  Bowel Management: continent  - Colace BID and Suppository PRN   - Will monitor for regularity   Mood: stable   Sleep: monitor; Will consider sleep aid as clinically indicated   Skin: Monitor   DVT ppx: Lovenox 40mg daily     Discharge Planning:  Tentative Discharge Date: 5/16  Tentative DC location: Home with Family     No future appointments.      Krzysztof Rowell MD  [facility] [place]

## 2017-05-06 NOTE — PROGRESS NOTES
Occupational Therapy   FIM SCORES    Antonina Butler   MRN: [id number]          05/06/17 1500   Transfers   Toilet 4   Self Care   Toileting 5   Shanta Harp OTR/CAN  5/6/2017

## 2017-05-07 PROCEDURE — 63600175 PHARM REV CODE 636 W HCPCS: Performed by: HOSPITALIST

## 2017-05-07 PROCEDURE — 25000003 PHARM REV CODE 250: Performed by: PHYSICAL MEDICINE & REHABILITATION

## 2017-05-07 PROCEDURE — 25000003 PHARM REV CODE 250: Performed by: HOSPITALIST

## 2017-05-07 PROCEDURE — 99232 SBSQ HOSP IP/OBS MODERATE 35: CPT | Mod: ,,, | Performed by: PHYSICAL MEDICINE & REHABILITATION

## 2017-05-07 PROCEDURE — 12800000 HC REHAB SEMI-PRIVATE ROOM

## 2017-05-07 RX ADMIN — ATORVASTATIN CALCIUM 40 MG: 20 TABLET, FILM COATED ORAL at 09:05

## 2017-05-07 RX ADMIN — ENOXAPARIN SODIUM 40 MG: 100 INJECTION SUBCUTANEOUS at 04:05

## 2017-05-07 RX ADMIN — ASPIRIN 81 MG: 81 TABLET, COATED ORAL at 09:05

## 2017-05-07 RX ADMIN — AMLODIPINE BESYLATE 5 MG: 5 TABLET ORAL at 09:05

## 2017-05-07 RX ADMIN — DOCUSATE SODIUM 100 MG: 100 CAPSULE, LIQUID FILLED ORAL at 09:05

## 2017-05-07 NOTE — PROGRESS NOTES
"Admit Date: 5/2/2017   LOS: 5 days     HPI, PFSH reviewed -- No change from admission.    Interval History/ROS: No acute events over night. Tolerating therapy, denied pain or discomfort. Slept well over night.   - No shortness of breath, chest pain, nausea, or vomiting      Functional: Min/Mod for bed mobility, CGA for sit to stand (improved), CGA for transfers (improved), gait 120' MNA RW 2/2 right knee hyperextension, Mod I w eating, Supervision w grooming, setup for UBD (improved), CGA toileting (improved)  Cog: Sup for memory, prob solving, Mod I with comp/Exp -- improving.  In the most recent ST session:  In a cause/effect task, patient identified cause/effect to hypothetical situations with 100% accy independently. In a money managament task, pt calculated finances with 80% accy independently given extended response time.  Patient demonstrated increased problem solving skills in all completed tasks as compared to most recent session."    Physical Exam:  Vital Signs Range (Last 24H):  Temp:  [98 °F (36.7 °C)]   Pulse:  [61]   Resp:  [16]   BP: (119)/(68)   SpO2:  [95 %]   General Appearance:  In no acute distress and comfortable, sitting EOM working w PT    - Vital Signs reviewed  Neuro/Cognition: Alert, Awake, Oriented to person, place, situation    - Followed all commands   Psych/Affect: Appropriate   Cardiac: Normal rate   Pulmonary: No labored breathing, on RA   Abdomen: soft, non-tender, and not distended     : Voiding spontaneously, No momin   Extremities: No calf tenderness, no local swelling   Skin: Intact, Warm  Musculoskeletal: LUE/LLE WFL  RUE: SA 5/5, EF, EE,  5/5  RLE HF 3/5, KE 3/5, DF/PF 5/5    Scheduled Medications:   amlodipine  5 mg Oral Daily    aspirin  81 mg Oral Daily    atorvastatin  40 mg Oral Daily    docusate sodium  100 mg Oral BID    enoxaparin  40 mg Subcutaneous Daily       PRN Medications:   acetaminophen, bisacodyl, ondansetron    Review of patient's allergies " indicates:  No Known Allergies    Recent Laboratory Results:  Lab Results   Component Value Date    CREATININE 0.9 05/03/2017    BUN 17 05/03/2017     05/03/2017    K 4.0 05/03/2017     05/03/2017    CO2 31 (H) 05/03/2017     Lab Results   Component Value Date    WBC 4.74 05/03/2017    HGB 13.1 05/03/2017    HCT 40.7 05/03/2017    MCV 94 05/03/2017     05/03/2017     No results found for: PTT      Evaluation:   Antonina Butler is a 71 y.o. female admitted to inpatient rehabilitation on 5/2/2017 for stroke with impaired mobility and ADLs. Patient remains appropriate for PT, OT, and as required Speech therapy. Patient continues to require 24 hour nursing care as well as daily Physician assessment.     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, mild cog deficits, dysphagia -- improving  - cont w ASA/statin   - F/U with Neuro as OP   - cont w PT, OT, Speech      HTN -- improved control past 24h  - cont w Amlodipine daily  - monitor BP/HR closely as there can be fluctuation with initiation of therapy   5/5 VSS, cont w current mgmt      Pain: Will monitor closely and adjust as necessary   - Tylenol 650mg Q6 hrs PRN     Monitoring:    Nutrition/Swallow Status: Dental Soft Liquid Diet Level: Thin Feed   - Prealbumin -   22  - Nutritionist on board   Bladder Management: continent  Bowel Management: continent  - Colace BID and Suppository PRN   - Will monitor for regularity   Mood: stable   Sleep: monitor; Will consider sleep aid as clinically indicated   Skin: Monitor   DVT ppx: Lovenox 40mg daily     Discharge Planning:  Tentative Discharge Date: 5/16  Tentative DC location: Home with Family     No future appointments.      Krzysztof Rowell MD  [facility] [place]

## 2017-05-08 PROCEDURE — 12800000 HC REHAB SEMI-PRIVATE ROOM

## 2017-05-08 PROCEDURE — 25000003 PHARM REV CODE 250: Performed by: PHYSICAL MEDICINE & REHABILITATION

## 2017-05-08 PROCEDURE — 97116 GAIT TRAINING THERAPY: CPT

## 2017-05-08 PROCEDURE — 92507 TX SP LANG VOICE COMM INDIV: CPT

## 2017-05-08 PROCEDURE — 97530 THERAPEUTIC ACTIVITIES: CPT

## 2017-05-08 PROCEDURE — 97112 NEUROMUSCULAR REEDUCATION: CPT

## 2017-05-08 PROCEDURE — 99232 SBSQ HOSP IP/OBS MODERATE 35: CPT | Mod: ,,, | Performed by: PHYSICAL MEDICINE & REHABILITATION

## 2017-05-08 PROCEDURE — 97150 GROUP THERAPEUTIC PROCEDURES: CPT

## 2017-05-08 PROCEDURE — 97535 SELF CARE MNGMENT TRAINING: CPT

## 2017-05-08 PROCEDURE — 25000003 PHARM REV CODE 250: Performed by: HOSPITALIST

## 2017-05-08 PROCEDURE — 63600175 PHARM REV CODE 636 W HCPCS: Performed by: HOSPITALIST

## 2017-05-08 RX ADMIN — AMLODIPINE BESYLATE 5 MG: 5 TABLET ORAL at 07:05

## 2017-05-08 RX ADMIN — ATORVASTATIN CALCIUM 40 MG: 20 TABLET, FILM COATED ORAL at 07:05

## 2017-05-08 RX ADMIN — DOCUSATE SODIUM 100 MG: 100 CAPSULE, LIQUID FILLED ORAL at 08:05

## 2017-05-08 RX ADMIN — ASPIRIN 81 MG: 81 TABLET, COATED ORAL at 07:05

## 2017-05-08 RX ADMIN — DOCUSATE SODIUM 100 MG: 100 CAPSULE, LIQUID FILLED ORAL at 07:05

## 2017-05-08 RX ADMIN — ENOXAPARIN SODIUM 40 MG: 100 INJECTION SUBCUTANEOUS at 04:05

## 2017-05-08 NOTE — PROGRESS NOTES
"Admit Date: 5/2/2017   LOS: 6 days     HPI, PFSH reviewed -- No change from admission.    Interval History/ROS: No acute events over night. Tolerating therapy, denied pain or discomfort. Slept well over night.   - No shortness of breath, chest pain, nausea, or vomiting      Functional: Supervision for bed mobility, CGA for sit to stand (improved), CGA for transfers (improved), gait 120' MNA RW 2/2 right knee hyperextension, Mod I w eating, Supervision w grooming, setup for UBD (improved), CGA for LBD, CGA toileting (improved)  Cog: Sup for memory, prob solving, Mod I with comp/Exp -- improving.    In the most recent ST session:  "Oriented x4 independently. Pt recalled recent events (i.e. admit, d/c, family) with no difficulty. Pt ed re written compensatory strategies for memory (i.e. use of planner, notebook) to improve recall - pt voiced understanding. Pt completed functional STM task with ads with 65% accy independently, 80% accy with binary choice - binary choice needed to assist with recall. Pt answered math/money/time managment questions to improve mental maipulation skills - pt completed with 75% accy independently, 85% with repetitions/self corrections - repetitions neded to assist with recall. ."    Physical Exam:  Vital Signs Range (Last 24H):  Temp:  [97.5 °F (36.4 °C)-98.3 °F (36.8 °C)]   Pulse:  [53-66]   Resp:  [18]   BP: (132-136)/(72-85)   SpO2:  [95 %]   General Appearance:  In no acute distress and comfortable, working w PT on ambulation   - Vital Signs reviewed  Neuro/Cognition: Alert, Awake, Oriented to person, place, situation    - Followed all commands   Psych/Affect: Appropriate   Cardiac: Normal rate   Pulmonary: No labored breathing, on RA   Abdomen: soft, non-tender, and not distended     : Voiding spontaneously, No momin   Extremities: No calf tenderness, no local swelling   Skin: Intact, Warm  Musculoskeletal: LUE/LLE WFL  RUE: SA 5/5, EF, EE,  5/5  RLE HF 3/5, KE 3/5, DF/PF " 5/5    Scheduled Medications:   amlodipine  5 mg Oral Daily    aspirin  81 mg Oral Daily    atorvastatin  40 mg Oral Daily    docusate sodium  100 mg Oral BID    enoxaparin  40 mg Subcutaneous Daily       PRN Medications:   acetaminophen, bisacodyl, ondansetron    Review of patient's allergies indicates:  No Known Allergies    Recent Laboratory Results:  Lab Results   Component Value Date    CREATININE 0.9 05/03/2017    BUN 17 05/03/2017     05/03/2017    K 4.0 05/03/2017     05/03/2017    CO2 31 (H) 05/03/2017     Lab Results   Component Value Date    WBC 4.74 05/03/2017    HGB 13.1 05/03/2017    HCT 40.7 05/03/2017    MCV 94 05/03/2017     05/03/2017     No results found for: PTT      Evaluation:   Antonina Butler is a 71 y.o. female admitted to inpatient rehabilitation on 5/2/2017 for stroke with impaired mobility and ADLs. Patient remains appropriate for PT, OT, and as required Speech therapy. Patient continues to require 24 hour nursing care as well as daily Physician assessment.     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, mild cog deficits, dysphagia  - cont w ASA/statin   - F/U with Neuro as OP   - cont w PT, OT, Speech  -- improving     HTN -- improved control past 24h  - cont w Amlodipine daily  - monitor BP/HR closely as there can be fluctuation with initiation of therapy   5/8 VSS, cont w current mgmt      Pain: Will monitor closely and adjust as necessary   - Tylenol 650mg Q6 hrs PRN     Monitoring:    Nutrition/Swallow Status: Dental Soft Liquid Diet Level: Thin Feed   - Prealbumin -   22  - Nutritionist on board   Bladder Management: continent  Bowel Management: continent  - Colace BID and Suppository PRN   - Will monitor for regularity   Mood: stable   Sleep: monitor; Will consider sleep aid as clinically indicated   Skin: Monitor   DVT ppx: Lovenox 40mg daily     Discharge Planning:  Tentative Discharge Date: 5/16  Tentative  DC location: Home with Family     No future appointments.      Arlene Ramirez MD  [facility] [place]

## 2017-05-08 NOTE — PROGRESS NOTES
Physical Therapy  FIM scores    Antonina Butler   MRN: [id number]                05/08/17 0945   Transfers   Bed/Chair/WC 4   Locomotion   Distance Walked 3   Distance Wheelchair 3   Walk 4   Wheelchair 5   Mode C   Stairs 4           Kelvin Sung, PT 5/8/2017

## 2017-05-08 NOTE — PROGRESS NOTES
"Occupational Therapy   AM Treatment    Antonina Butler   MRN: [id number]      05/08/17 0730   OT Time Calculation   OT Start Time 0730   OT Stop Time 0815   OT Total Time (min) 45 min   General   OT Date of Treatment 05/08/17   Family/Caregiver Present No   Patient Found (position) Seated in wheelchair   Precautions   General Precautions fall   Visual/Auditory Vision impaired  (glasses)   Subjective   Patient states "I think I can do more than I'm allowed"   Pain/Comfort   Pain Rating 4/10   Location - Orientation lower   Location back   Bed Mobility   Rolling/Turning to Left Supervision   Rolling/Turning Right Supervision   Transfers   Transfer yes   Sit to Stand   Sit <> Stand Assistance Stand By Assistance;Contact Guard Assistance   Sit <> Stand Assistive Device No Assistive Device   Trials/Comments at edge of elevated mat, using mat for balance as needed   Stand to Sit   Assistance Contact Guard Assistance   Assistive Device No Assistive Device   Feeding   Feeding Level of Assistance Modified independent   Feeding Where Assessed Wheelchair   Feeding Comments dentures   LE Dressing   LE Dressing  Level of Assistance Contact guard   LE Dressing Level of Assistance Contact guard   Shoe Level of Assistance Modified independent  (slip on shoes)   LE Dressing Where Assessed Wheelchair   LE Dressing Comments cues to lock L w/c brake prior to standing, steadying assist only   Additional Activities   Additional Activities Other (Comment)   Additional Activities Comments  - Pt stood at tabletop, folded laundry and then placed into basket using BUEs while standing at mat. OT providing stability at R knee and cues for upright posture during stand.    Activity Tolerance   Activity Tolerance Patient tolerated treatment well   Medical Staff Made Aware NSG   After Treatment   Patient Position After Treatment Seated in wheelchair   Patient after treatment left call button in reach   Assessment   Prognosis Good   Problem List " Decreased Self Care skills;Decreased safe judgment during ADL;Decreased cognition;Decreased endurance;Decreased functional mobility;Decreased IADLs;Decreased trunk control for functional activities   Assessment Pt progressing, with noted increased (I) with LB dressing today and less episodes of knee buckling and improved trunk control in standing. Pt still requires CGA for balance due to weakness in RLE however overall requiring less steadying than previous sessions.   Level of Motivation/Participation good   Discharge Recommendations   Equipment Needed After Discharge 3-in-1 commode;walker, rolling;shower chair   Discharge Facility/Level Of Care Needs outpatient OT   Plan   Plan Continue with current plan   Therapy Frequency 2 times/day;Monday-Friday;Saturday or Sunday   Occupational Therapy Follow-up   OT Follow-up? Yes   Treatment/Billable Minutes   Self Care/Home Management 15   Therapeutic Activity 30   Total Time 45     BRI Gomez   5/8/2017

## 2017-05-08 NOTE — PROGRESS NOTES
"Physical Therapy   Treatment    Antonina Butler   MRN: [id number]                    05/08/17 0855   PT Time Calculation   PT Start Time 0855   PT Stop Time 0942   PT Total Time (min) 47 min   Treatment   Treatment Type Treatment   PT/PTA PT   General   PT Received On 05/08/17   Family/Caregiver Present No   Patient Found (position) Seated in wheelchair;Other (comment)  (in room with LEs propped up on bed)   Precautions   General Precautions fall   Visual/Auditory Vision impaired;Other (see comments);Hearing impaired  (glasses)   Subjective   Patient states " My R leg was tingling on Saturday."   Pain/Comfort   Pain Rating no pain   Pain Rating Post-Intervention no pain   Bed Mobility   Bed Mobility no   Transfers   Transfer yes   Sit to Stand   Sit <> Stand Assistance Contact Guard Assistance   Sit <> Stand Assistive Device No Assistive Device;Rolling Walker   Trials/Comments multiple trials~ cues for technique   Stand to Sit   Assistance Contact Guard Assistance   Assistive Device No Assistive Device;Rolling Walker   Trials/Comments multiple trials~ cues for technique   Chair to Mat   Chair<> Mat Technique Stand Pivot   Chair<>Mat Assistance Contact Guard Assistance   Chair <> Mat Assistive Device No Assistive Device   Trials/Comments x 3 trials~ cues for hand placement and technique   Mat to Chair   Technique Stand Pivot   Assistance Contact Guard Assistance   Assistive Device No Assistive Device   Trials/Comments x 3 trials~ cues for hand placement and technique   Wheelchair Activities   Propulsion Yes   Propulsion Type 1 Manual   Level 1 Level tile   Method 1 Right upper extremity;Left upper extremity   Level of Assistance 1 Supervision   Description/ Details 1 200 feet   Gait   Gait Yes   Weight Bearing Status full   Gait 1   Surface 1 Level tile   Gait Assistive Device Rolling walker   Assistance 1 Minimum assistance   Gait Distance 169 feet   Gait Pattern reciprocal   Gait Deviation(s) decreased " duran;increased time in double stance;decreased velocity of limb motion;decreased step length;decreased stride length;decreased swing-to-stance ratio;decreased toe-to-floor clearance;decreased weight-shifting ability;excessive knee flexion;knee hyperextension;forward lean  (L knee flexed in stance and R knee tends to hyperextend)   Impairments Contributing to Gait Deviations impaired balance;impaired motor control;abnormal muscle tone;impaired postural control;decreased strength;other (see comments)  (decreased cognition and attention to task)   Gait 2   Surface 2 Level tile   Device 2 Rolling walker   Other Apparatus 2 Other (Comment)  (R knee cage)   Assistance 2 Contact guard   Quality of Gait 2 improved R knee control noted with use of cage   Comments/Distance (ft) 2 169 feet   Gait Pattern Used other (see comments)  (reciprocal)   Gait Deviations Noted other (see comments)  (as above)   Impairments Contributing to Gait Deviations other (see comments)  (as above)   Stairs   Stairs Yes   Stairs/Curb Step   Rails 1 Bilateral   Device 1 No device   Assistance 1 Minimum assistance   Comment/# Steps 1 pt ascends and descends 12 steps with R LE leading when ascending and L LE leading when descending( to strengthen R knee)   Balance   Balance Yes   Dynamic Standing Balance   Dynamic Standing-Balance Support No upper extremity supported   Dynamic Standing-Balance (side stepping along edge of mat)   Dynamic Standing-Comments pt performs several trials of side stepping along edge of mat with PT providing min A from R side  (mirror placed for visual feedback)   Other Activities   Other Activities Other (Comment)  (pt performs co-contraction of R knee musculature at steps)   Comments PT has pt place R foot on bottom step and pt lifts enough to clear L foot from floor~ pt balances on R LE a few seconds , then repeats this task approx. 10 trials   Other Comments   Comments pt performs seated ball toss from w/c x 3 minutes    After Treatment   Patient Position After Treatment Seated in wheelchair;Other (comment)  (posey belt in place)   Patient after treatment left (pt waits in gym for ST session to begin)   Assessment   Prognosis Good   Problem List Decreased strength;Decreased endurance;Impaired balance;Decreased mobility;Decreased cognition;Impaired vision;Impaired hearing;Impaired tone   Session Assessment progressing toward goals   Assessment Pt is making progress with all functional mobility skills but remains limited by mild R hemiparesis, cognitive deficits and inability to fully attend to some tasks being performed.  Knee cage helped pt control R knee better during ambulation, though PT does not plan to use this with all future gait trials.  Pt also performed side-stepping much easier today and with less apprehension.  Pt remains appropriate for inpatient rehab setting.   Level of Motivation/Participation good   Barriers to Discharge None   Barriers to Discharge Comments  can assist at time of discharge   Discharge Recommendations   Equipment Needed After Discharge 3-in-1 commode;walker, rolling;shower chair;other (see comments)  (w/c?????)   Discharge Facility/Level Of Care Needs outpatient PT   Plan   Planned Therapy Intervention Continue with current plan;Bed mobility training;Transfer training;Gait training;Balance Training;Strengthening;Neuromuscular Re-education;Motor Coordination Training;Postural Re-education;ROM;Wheelchair Management/Propulsion   Therapy Frequency 2 times/day;daily;Monday-Friday;Saturday or Sunday   Physical Therapy Follow-up   PT Follow-up? Yes   PT - Next Visit Date 05/09/17   Treatment/Billable Minutes   Gait training 12   Therapeutic Activity 15   Neuromuscular Re-education 20   Total Time 47           Kelvin Sung, PT 5/8/2017

## 2017-05-08 NOTE — PROGRESS NOTES
Occupational Therapy   FIM Scores    Antoninamariel Butler   MRN: [id number]      05/08/17 0730   Transfers   Bed/Chair/WC 4   Self Care   Eating 6   Dressing-Lower 4     BRI Gomez   5/8/2017

## 2017-05-08 NOTE — PROGRESS NOTES
"Physical Therapy   Treatment/Transfer Group    Antonina Butler   MRN: [id number]                  05/08/17 1515   PT Time Calculation   PT Start Time 1513   PT Stop Time 1600   PT Total Time (min) 47 min   Treatment   Treatment Type Treatment;Other (see comments)  (Transfer group)   PT/PTA PT   General   PT Received On 05/08/17   Family/Caregiver Present No   Patient Found (position) Supine in bed;Other (comment)  (sleeping)   Precautions   General Precautions fall   Visual/Auditory Hearing impaired;Vision impaired;Other (see comments)  (glasses)   Subjective   Patient states " I'm sore."   Pain/Comfort   Pain Rating no pain   Pain Rating Post-Intervention no pain   Bed Mobility   Bed Mobility yes   Supine to Sit Modified Independent   Supine to Sit Comments x 1 on bed   Transfers   Transfer yes   Bed to Chair   Bed <> Chair Technique Stand Pivot   Bed <> Chair Assistance Contact Guard Assistance   Bed <> Chair Assistive Device No Assistive Device   Trials/Comments 1 trial   Activity Tolerance   Activity Tolerance Patient tolerated treatment well   After Treatment   Patient Position After Treatment Seated in wheelchair;Other (comment)  (pt propels to her room)   Patient after treatment left (posey belt in place)   Assessment   Prognosis Good   Plan   Planned Therapy Intervention Continue with current plan   Therapy Frequency 2 times/day;daily;Monday-Friday;Saturday or Sunday   Physical Therapy Follow-up   PT Follow-up? Yes   PT - Next Visit Date 05/09/17   Treatment/Billable Minutes   Therapeutic Activity Group 47   Total Time 47         Patient participated in 45 minute functional transfers group. The group activity challenged bilateral upper extremity and lower extremity strengthening. In addition, cardiovascular and functional endurance were challenged during this group. Pt performed various UE and LE ex and also tossed ball. Stand pivot transfers w/c<> mat x 2 trials with CGA. Patient completed bed mobility from " supine to sitting edge of mat with _mod I . Patient completed toilet transfer x 2 trials with _CGA and use of 3 in 1 commode.  Patient completed (12) sit<> stand transfer(s) from the w/c with CGA/SBA .Patient demonstrated appropriate safety awareness with functional transfers. Educated patient on compensatory and adaptive techniques for functional home transfers. Patient demonstrated increased independence with all transfers (or list specific transfers). These activities were performed in a group setting to encourage increased participation with peers and social interaction skills.          Kelvin Sung, PT 5/8/2017

## 2017-05-08 NOTE — PROGRESS NOTES
Speech Therapy   Treatment    Antonina Butler   MRN: [id number]      17 0945   Speech Time Calculation   Speech Start Time 0945   Speech Stop Time 1030   Speech Total (min) 45 min   General Information   SLP Treatment Date 17   General Observations Pt seen in ST office sitting up in w/c - pt alert and motivated t/o session.    General Precautions fall   Visual/Auditory Vision impaired;Hearing impaired  (glasses)   Pain/Comfort   Pain Rating no pain       SLP Cognitive Treatment   Treatment Detail (SLP Cognitive Treatment) Oriented x4 independently. Pt recalled recent events (i.e. admit, d/c, family) with no difficulty. Pt ed re written compensatory strategies for memory (i.e. use of planner, notebook) to improve recall - pt voiced understanding. Pt completed functional STM task with ads with 65% accy independently, 80% accy with binary choice - binary choice needed to assist with recall. Pt answered math/money/time managment questions to improve mental maipulation skills - pt completed with 75% accy independently, 85% with repetitions/self corrections - repetitions neded to assist with recall.     Assessment   SLP Diagnosis mild cog-ling deficits   Prognosis Good   Problem List reduced insight; decreased memory   Level of Motivation/Participation good   Discharge Recommendations   Discharge Facility/Level Of Care Needs outpatient speech therapy   Plan   Plan Continue with current plan   Therapy Frequency Monday-Friday   SLP Follow-up   SLP Follow-up? Yes   Treatment/Billable Minutes   Speech Therapy Individual 45   Total Time 45     FIM Scores  Comprehension:6  Expression: 6  Social Interaction:6  Problem Solvin  Memory: 5      Comprehension: Complex= humor, finances, rationale for medical treatment(hip precautions, pressure relief)  Basic= pain, hunger, thirst, bathroom needs, cold, nutrition, sleep      Understands complex/abstract conversation/directions with extra time, assistance device(glasses,  if visual mode or both; hearing aide if auditory mode or both) (6)      Expression:  Expresses complex / abstract ideas with extra time, assistive devic (augmentative communication device (6)      Social Interaction:  Interacts appropriately with others with medication or extra time(anti-anxiety, antidepressant) (6)      Problem Solving:  Solves complex problems with cues (5)      Memory:  Recognizes, recalls, or executes 3 steps of 3 step request 90% of time (cueing, reminders<10%, loses track of time) (5)      Drea Brumfield CF-SLP  5/8/2017

## 2017-05-09 PROCEDURE — 97110 THERAPEUTIC EXERCISES: CPT

## 2017-05-09 PROCEDURE — 97530 THERAPEUTIC ACTIVITIES: CPT

## 2017-05-09 PROCEDURE — 97112 NEUROMUSCULAR REEDUCATION: CPT

## 2017-05-09 PROCEDURE — 63600175 PHARM REV CODE 636 W HCPCS: Performed by: HOSPITALIST

## 2017-05-09 PROCEDURE — 97116 GAIT TRAINING THERAPY: CPT

## 2017-05-09 PROCEDURE — 99233 SBSQ HOSP IP/OBS HIGH 50: CPT | Mod: ,,, | Performed by: PHYSICAL MEDICINE & REHABILITATION

## 2017-05-09 PROCEDURE — 25000003 PHARM REV CODE 250: Performed by: PHYSICAL MEDICINE & REHABILITATION

## 2017-05-09 PROCEDURE — 92507 TX SP LANG VOICE COMM INDIV: CPT

## 2017-05-09 PROCEDURE — 12800000 HC REHAB SEMI-PRIVATE ROOM

## 2017-05-09 PROCEDURE — 25000003 PHARM REV CODE 250: Performed by: HOSPITALIST

## 2017-05-09 PROCEDURE — 97150 GROUP THERAPEUTIC PROCEDURES: CPT

## 2017-05-09 RX ADMIN — AMLODIPINE BESYLATE 5 MG: 5 TABLET ORAL at 08:05

## 2017-05-09 RX ADMIN — ASPIRIN 81 MG: 81 TABLET, COATED ORAL at 08:05

## 2017-05-09 RX ADMIN — DOCUSATE SODIUM 100 MG: 100 CAPSULE, LIQUID FILLED ORAL at 08:05

## 2017-05-09 RX ADMIN — ENOXAPARIN SODIUM 40 MG: 100 INJECTION SUBCUTANEOUS at 04:05

## 2017-05-09 RX ADMIN — ATORVASTATIN CALCIUM 40 MG: 20 TABLET, FILM COATED ORAL at 08:05

## 2017-05-09 NOTE — PROGRESS NOTES
Physical Therapy   Treatment    Antonina Butler   MRN: [id number]                  05/09/17 1031   PT Time Calculation   PT Start Time 1031   PT Stop Time 1116   PT Total Time (min) 45 min   Treatment   Treatment Type Treatment   PT/PTA PT   General   PT Received On 05/09/17   Family/Caregiver Present No   Patient Found (position) Seated in wheelchair;Other (comment)  (in room)   Pt found with (posey belt)   Precautions   General Precautions fall   Visual/Auditory Hearing impaired;Vision impaired;Other (see comments)  (glasses)   Subjective   Patient states she had some tingling in her R leg last night, but not as much as the previous night.   Pain/Comfort   Pain Rating no pain   Pain Rating Post-Intervention no pain   Bed Mobility   Bed Mobility no   Transfers   Transfer yes   Sit to Stand   Sit <> Stand Assistance Stand By Assistance   Sit <> Stand Assistive Device No Assistive Device;Rolling Walker   Trials/Comments multiple trials   Stand to Sit   Assistance Stand By Assistance;Contact Guard Assistance   Assistive Device Rolling Walker;No Assistive Device   Trials/Comments multiple trials   Chair to Mat   Chair<> Mat Technique Stand Pivot   Chair<>Mat Assistance Stand By Assistance   Chair <> Mat Assistive Device No Assistive Device   Trials/Comments 3 trials~ cues for technique   Mat to Chair   Technique Stand Pivot   Assistance Stand By Assistance   Assistive Device No Assistive Device   Trials/Comments 3 trials~ cues for technique   Wheelchair Activities   Propulsion Yes   Propulsion Type 1 Manual   Level 1 Level tile   Method 1 Right upper extremity;Left upper extremity   Level of Assistance 1 Modified Independent   Description/ Details 1 200 feet   Gait   Gait Yes   Weight Bearing Status full   Gait 1   Surface 1 Level tile   Gait Assistive Device Rolling walker   Assistance 1 Minimum assistance;Contact Guard Assistance   Gait Distance 231 feet   Gait Pattern reciprocal   Gait Deviation(s) decreased  duran;increased time in double stance;decreased velocity of limb motion;decreased step length;decreased stride length;decreased swing-to-stance ratio;decreased toe-to-floor clearance;decreased weight-shifting ability;other (see comments)  (decreased stance time R LE;  mild R knee instability)   Impairments Contributing to Gait Deviations impaired balance;impaired motor control;abnormal muscle tone;impaired postural control;decreased strength;other (see comments)  (decreased cognition)   Stairs   Stairs No   Balance   Balance Yes   Dynamic Standing Balance   Dynamic Standing-Balance Support No upper extremity supported   Dynamic Standing-Balance (unilateral stance activity with PT seated at pt's R side)   Dynamic Standing-Comments pt practices stepping onto 3 separate pieces of tape on her L side~ anterior, lateral and posterior~ PT assists with weight shift and stability( mod A)   Other Activities   Other Activities Other (Comment)  (pt practices timing of hip and knee ext against wall)   Comments PT assists pt in above task( while seated in front)~ pt flexes knees, leans away from wall with shoulders, extends hips( then reverses).  Pt performs this task approx. 12 times~ PT places strap around his leg and pt's R leg to prevent R knee hyperextension.   Activity Tolerance   Activity Tolerance Patient tolerated treatment well   After Treatment   Patient Position After Treatment Seated in wheelchair;Other (comment)  (posey belt in place)   Patient after treatment left (pt directly to OT group)   Assessment   Prognosis Good   Problem List Decreased strength;Decreased endurance;Impaired balance;Decreased mobility;Decreased cognition;Impaired vision;Impaired hearing;Impaired tone   Session Assessment progressing toward goals   Assessment Pt continues to do well with all challenges PT presents.  Pt is slowly gaining more confidence in unilateral weight bearing to R LE.  Pt is still challenged by mild R hemiparesis,  decreased balance and decreased cognition; she remains an excellent rehab candidate.   Level of Motivation/Participation good   Barriers to Discharge None   Barriers to Discharge Comments  will assist at time of discharge   Discharge Recommendations   Equipment Needed After Discharge 3-in-1 commode;walker, rolling;shower chair   Discharge Facility/Level Of Care Needs outpatient PT   Plan   Planned Therapy Intervention Continue with current plan;Bed mobility training;Transfer training;Gait training;Balance Training;ROM;Strengthening;Neuromuscular Re-education;Motor Coordination Training;Postural Re-education;Wheelchair Management/Propulsion   Therapy Frequency 2 times/day;daily;Monday-Friday;Saturday or Sunday   Physical Therapy Follow-up   PT Follow-up? Yes   PT - Next Visit Date 05/10/17   Treatment/Billable Minutes   Gait training 10   Therapeutic Activity 15   Neuromuscular Re-education 20   Total Time 45           Kelvin Sung, PT 5/9/2017

## 2017-05-09 NOTE — PROGRESS NOTES
"Admit Date: 5/2/2017   LOS: 7 days     HPI, PFSH reviewed -- No change from admission.    Interval History/ROS: No acute events over night. Tolerating therapy, denied pain or discomfort. Slept well over night.   - No shortness of breath, chest pain, nausea, or vomiting      Functional: mod I for bed mobility, CGA for sit to stand (improved), CGA for transfers (improved), gait 120' MNA RW 2/2 right knee hyperextension,   Mod I w eating, Supervision w grooming, setup for UBD (improved), CGA for LBD, CGA toileting (improved)  Cog: Sup for memory, Mod I with prob solving, Ind with comp/Exp  In the most recent ST session:  "Pt engaged in CVA education regarding safety, returning to driving, types of CVA, and therapy recovery process. Pt and pt's spouse with appropriate questions and responses to information. Pt completed simple written math task with 100% acc indly. Simple deductive reasoning task completed with 90% acc indly, given supervision pt with increase to 100% acc for increased attention to details and organization of task. Recall task of "To Do List" completed following 5 minute delay with 33% acc indly, given moderate verbal cues and F=3 for choices, pt with increase to 91% acc. Pt verbalized fear of having another CVA. Education provided further regarding controllable factors for prevention of CVA, pt and pt's spouse verbalized understanding. Pt noted with increased processing skills with increased response time-WFL at this time. "    Physical Exam:  Vital Signs Range (Last 24H):  Temp:  [97.6 °F (36.4 °C)-98.2 °F (36.8 °C)]   Pulse:  [55-62]   Resp:  [17-18]   BP: (145-156)/(73-89)   SpO2:  [93 %-95 %]   General Appearance:  In no acute distress and comfortable   - Vital Signs reviewed  Neuro/Cognition: Alert, Awake, Oriented to person, place, situation    - Followed all commands   Psych/Affect: Appropriate   Cardiac: Normal rate   Pulmonary: No labored breathing, on RA   Abdomen: soft, non-tender, and not " distended     : Voiding spontaneously, No momin   Extremities: No calf tenderness, no local swelling   Skin: Intact, Warm  Musculoskeletal: LUE/LLE WFL  RUE: SA 5/5, EF, EE,  5/5  RLE HF 3/5, KE 3/5, DF/PF 5/5    Scheduled Medications:   amlodipine  5 mg Oral Daily    aspirin  81 mg Oral Daily    atorvastatin  40 mg Oral Daily    docusate sodium  100 mg Oral BID    enoxaparin  40 mg Subcutaneous Daily       PRN Medications:   acetaminophen, bisacodyl, ondansetron    Review of patient's allergies indicates:  No Known Allergies    Recent Laboratory Results:  Lab Results   Component Value Date    CREATININE 0.9 05/03/2017    BUN 17 05/03/2017     05/03/2017    K 4.0 05/03/2017     05/03/2017    CO2 31 (H) 05/03/2017     Lab Results   Component Value Date    WBC 4.74 05/03/2017    HGB 13.1 05/03/2017    HCT 40.7 05/03/2017    MCV 94 05/03/2017     05/03/2017     No results found for: PTT      Evaluation:   Antonina Butler is a 71 y.o. female admitted to inpatient rehabilitation on 5/2/2017 for stroke with impaired mobility and ADLs. Patient remains appropriate for PT, OT, and as required Speech therapy. Patient continues to require 24 hour nursing care as well as daily Physician assessment.     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, mild cog deficits, dysphagia  - cont w ASA/statin   - F/U with Neuro as OP   - cont w PT, OT, Speech  -- improving     HTN -- improved control past 24h  - cont w Amlodipine daily  - monitor BP/HR closely as there can be fluctuation with initiation of therapy   5/9 VSS although BP trending higher today, cont to monitor closely, and w current mgmt      Pain: Will monitor closely and adjust as necessary   - Tylenol 650mg Q6 hrs PRN     Monitoring:    Nutrition/Swallow Status: Dental Soft Liquid Diet Level: Thin Feed   - Prealbumin -   22  - Nutritionist on board   Bladder Management: continent  Bowel Management:  continent  - Colace BID and Suppository PRN   - Will monitor for regularity   Mood: stable   Sleep: monitor; Will consider sleep aid as clinically indicated   Skin: Monitor   DVT ppx: Lovenox 40mg daily     Discharge Planning:  Tentative Discharge Date: 5/16  Tentative DC location: Home with Family     No future appointments.      Arlene Ramirez MD  [facility] [place]

## 2017-05-09 NOTE — PROGRESS NOTES
Occupational Therapy  [place] FIM  Antonina Butler   MRN: [id number]        05/09/17 1700   Transfers   Bed/Chair/WC 4       PAU Dumont 5/9/2017

## 2017-05-09 NOTE — PROGRESS NOTES
Physical Therapy   FIM scores    Antonina Butler   MRN: [id number]                  05/09/17 1737   Transfers   Bed/Chair/WC 5   Locomotion   Distance Walked 3   Distance Wheelchair 3   Walk 4   Wheelchair 6   Mode C   Stairs 0             Kelvin Sung, PT 5/9/2017

## 2017-05-09 NOTE — PROGRESS NOTES
"Speech Therapy   Treatment    Antonina Butler   MRN: [id number]        05/09/17 1300   Speech Time Calculation   Speech Start Time 1300   Speech Stop Time 1345   Speech Total (min) 45 min   General Information   SLP Treatment Date 05/09/17   General Observations Pt seen with spouse present in ST office.   General Precautions fall   Visual/Auditory Hearing impaired;Vision impaired  (glasses)   Subjective   Patient states Pt stated "I'm nervous about having another stoke and now disappointment I cant drive yet."   Pain/Comfort   Pain Rating no pain       SLP Cognitive Treatment   Treatment Detail (SLP Cognitive Treatment) Pt engaged in CVA education regarding safety, returning to driving, types of CVA, and therapy recovery process. Pt and pt's spouse with appropriate questions and responses to information. Pt completed simple written math task with 100% acc indly. Simple deductive reasoning task completed with 90% acc indly, given supervision pt with increase to 100% acc for increased attention to details and organization of task. Recall task of "To Do List" completed following 5 minute delay with 33% acc indly, given moderate verbal cues and F=3 for choices, pt with increase to 91% acc. Pt verbalized fear of having another CVA. Education provided further regarding controllable factors for prevention of CVA, pt and pt's spouse verbalized understanding. Pt noted with increased processing skills with increased response time-WFL at this time.    Assessment   SLP Diagnosis mild cognitive deficits   Prognosis Good   Problem List decreased safety awareness; mildly delayed responses   Session Assessment progressing toward goals   Level of Motivation/Participation good   Discharge Recommendations   Discharge Facility/Level Of Care Needs outpatient speech therapy   Plan   Plan Continue with current plan   Therapy Frequency Monday-Friday   SLP Follow-up   SLP Follow-up? Yes   Treatment/Billable Minutes   Speech Therapy " Individual 45   Total Time 45     FIM Scores  Comprehension:7  Expression: 7  Social Interaction:6  Problem Solvin  Memory: 5    Comprehension:  Complex= humor, finances, rationale for medical treatment(hip precautions, pressure relief)  Basic= pain, hunger, thirst, bathroom needs, cold, nutrition, sleep    Understands complex/abstract conversation/directions. (7)    Expression:  Expresses Complex / abstract ideas. (7)    Social Interaction:  Interacts appropriately with others with medication or extra time(anti-anxiety, antidepressant)  (6)    Problem Solving:  Solves complex problems with extra time (6)    Memory:  Recognizes, recalls, or executes 3 steps of 3 step request 90% of time (cueing, reminders<10%, loses track of time)  (5)    Stefany Fagan CCC-SLP  2017

## 2017-05-09 NOTE — PROGRESS NOTES
"Occupational Therapy  AM Treatment Session  Antonina Butler   MRN: [id number]            05/09/17 0916   OT Time Calculation   OT Start Time 0916   OT Stop Time 1001   OT Total Time (min) 45 min   General   OT Date of Treatment 05/09/17   Family/Caregiver Present No   Patient Found (position) Seated in wheelchair   Precautions   General Precautions fall   Visual/Auditory Hearing impaired  (glasses)   Subjective   Patient states "This one I cant push back too far" re: Pt reported while ambulating within training kitchen referring to RLE   Pain/Comfort   Pain Rating 4/10   Location - Orientation lower   Location back   Sit to Stand   Sit <> Stand Assistance Stand By Assistance   Sit <> Stand Assistive Device Rolling Walker   Trials/Comments multiple trials from  for functional ambulation activity   Stand to Sit   Assistance Contact Guard Assistance   Assistive Device Rolling Walker   Trials/Comments cue for hand placement   Chair to Mat   Chair <>Mat Technique Stand Pivot   Chair <> Mat Assistance Contact Guard Assistance   Chair<> Mat Assistive Device Rolling Walker   Trials/Comments during pivot   Exercise Tools   Bilateral [place] 5# on incline 5/20x reps for UE strengthening   Other Exercise Tool 2 unigym: seated rows 4# 5/20x reps   Additional Activities   Additional Activities Other (Comment)   Additional Activities Comments -functional mobility activity ambulating x4 trials ~35' from counter <>  to unload/load, retreive items from refridgerator, and reach for household items from cabinet while using RW. No LOB noted. Pt instructed for RW positioning. (Pt required 1 short rest break)   Activity Tolerance   Activity Tolerance Patient tolerated treatment well   After Treatment   Patient Position After Treatment Seated in wheelchair   Patient after treatment left call button in reach;nursing notified   Assessment   Prognosis Good   Problem List Decreased Self Care skills;Decreased safe judgment during " ADL;Decreased cognition;Decreased endurance;Decreased functional mobility;Decreased IADLs;Decreased trunk control for functional activities   Assessment Pt used good effort during functional mobility to promote safety during ambulation and BLE strengthening. Pt required MIN cues for RW alignement during pivots. Pt would continue to benefit from OT services to increase functional mobility.    Level of Motivation/Participation good   Discharge Recommendations   Equipment Needed After Discharge 3-in-1 commode;shower chair;walker, rolling   Discharge Facility/Level Of Care Needs outpatient OT   Plan   Plan Continue with current plan   Therapy Frequency 2 times/day;Monday-Friday;Saturday or Sunday   Treatment/Billable Minutes   Therapeutic Activity 25   Therapeutic Exercise 20   Total Time 45       The JANER and JACKSON have collaborated and discussed the patient's status, treatment plan and progress toward established goals.     PAU Dumont 5/9/2017

## 2017-05-09 NOTE — PROGRESS NOTES
Occupational Therapy  AM Group  Antonina Butler   MRN: [id number]     05/09/17 1116   OT Time Calculation   OT Start Time 1116   OT Stop Time 1201   OT Total Time (min) 45 min   General   OT Date of Treatment 05/09/17   Patient Found (position) Seated in wheelchair   Treatment/Billable Minutes   Therapeutic Group 45   Total Time 45   Pt. Participated in 45 minute FM coordination Group.  The activity was performed to increase (B) UE strength, coordination, FM manipulation, eye-hand coordination.  Pt. Performed FM coordination activity with (B) hand with  lacing cards, small pins to inc. Functional use of BUE and coordination. Pt. Was able to complete tasks with (S)Assist.  Pt. Demonstrated increased [place] with FM tasks.  These activities were performed in a group setting to encourage increased participation with peers and social interaction skills.        The BRI and MANUEL have collaborated and discussed the patient's status, treatment plan and progress toward established goals.     PAU Dumont 5/9/2017

## 2017-05-10 PROCEDURE — 25000003 PHARM REV CODE 250: Performed by: PHYSICAL MEDICINE & REHABILITATION

## 2017-05-10 PROCEDURE — 97150 GROUP THERAPEUTIC PROCEDURES: CPT

## 2017-05-10 PROCEDURE — 97530 THERAPEUTIC ACTIVITIES: CPT

## 2017-05-10 PROCEDURE — 12800000 HC REHAB SEMI-PRIVATE ROOM

## 2017-05-10 PROCEDURE — 99233 SBSQ HOSP IP/OBS HIGH 50: CPT | Mod: ,,, | Performed by: PHYSICAL MEDICINE & REHABILITATION

## 2017-05-10 PROCEDURE — 63600175 PHARM REV CODE 636 W HCPCS: Performed by: HOSPITALIST

## 2017-05-10 PROCEDURE — 25000003 PHARM REV CODE 250: Performed by: HOSPITALIST

## 2017-05-10 PROCEDURE — 97116 GAIT TRAINING THERAPY: CPT

## 2017-05-10 PROCEDURE — 92507 TX SP LANG VOICE COMM INDIV: CPT

## 2017-05-10 RX ADMIN — ATORVASTATIN CALCIUM 40 MG: 20 TABLET, FILM COATED ORAL at 08:05

## 2017-05-10 RX ADMIN — ASPIRIN 81 MG: 81 TABLET, COATED ORAL at 08:05

## 2017-05-10 RX ADMIN — DOCUSATE SODIUM 100 MG: 100 CAPSULE, LIQUID FILLED ORAL at 08:05

## 2017-05-10 RX ADMIN — ENOXAPARIN SODIUM 40 MG: 100 INJECTION SUBCUTANEOUS at 06:05

## 2017-05-10 RX ADMIN — AMLODIPINE BESYLATE 5 MG: 5 TABLET ORAL at 08:05

## 2017-05-10 NOTE — PROGRESS NOTES
"Speech Therapy  Progress Note/ Treatment    Antonina Butler   MRN: [id number]     Short Term Goals   Goal 1 Pt will complete problem solving/reasoning complex tasks with modified independence with 95% acc. MET   Goal 2 Pt will complete recall task with modified independence with 95% acc. CONTINUE   Goal 3 Pt will complete executive functioning financial/medication management tasks independently with 100% acc. CONTINUE   Long Term Goals   Goal 1 Pt will complete problem solving/reasoning complex tasks with independence with 100% acc.COTINUE   Goal 2 Pt will complete recall task with independence with 100% acc.   Goal 3 Pt will participate in CVA education. ONGOING        05/10/17 0930   Speech Time Calculation   Speech Start Time 0930   Speech Stop Time 1015   Speech Total (min) 45 min   General Information   SLP Treatment Date 05/10/17   General Observations Pt seen individually in ST office following restroom needs.   General Precautions fall;hearing impaired   Visual/Auditory Hearing impaired;Vision impaired  (glasses)   Subjective   Patient states Pt stated "I got up from the toilet to my chair alone because its just so close."   Pain/Comfort   Pain Rating no pain       SLP Cognitive Treatment   Treatment Detail (SLP Cognitive Treatment) Pt taken to restroom prior to session with SLP catching pt transferring herself from toilet back to w/c independently. Education provided at length regarding safety and recommendations at this time. Pt agreeable, but remains with decreased safety awareness within functional tasks. Pt completed written task of name, date, address, and place indly with 100% acc. 5 and 6 step written sequencing task completed with 100% acc indly. Deductive reasoning written puzzle completed given supervision and 95% acc. Discussed plan of care and reasoning of continued therapy intensity within IP rehab setting with later transition to outpatient services, pt agreeable to POC.    Assessment   SLP " Diagnosis mild cognitive deficits   Prognosis Good   Problem List decreased safety awareness   Session Assessment progressing toward goals   Level of Motivation/Participation good   Discharge Recommendations   Discharge Facility/Level Of Care Needs outpatient speech therapy   Plan   Plan Continue with current plan   Therapy Frequency Monday-Friday   SLP Follow-up   SLP Follow-up? Yes   Treatment/Billable Minutes   Speech Therapy Individual 45   Total Time 45     FIM Scores  Comprehension:7  Expression: 7  Social Interaction:6  Problem Solvin  Memory: 5     Comprehension: Complex= humor, finances, rationale for medical treatment(hip precautions, pressure relief)  Basic= pain, hunger, thirst, bathroom needs, cold, nutrition, sleep     Understands complex/abstract conversation/directions. (7)     Expression:  Expresses Complex / abstract ideas. (7)     Social Interaction:  Interacts appropriately with others with medication or extra time(anti-anxiety, antidepressant) (6)     Problem Solving:  Solves complex problems with extra time (6)     Memory:  Recognizes, recalls, or executes 3 steps of 3 step request 90% of time (cueing, reminders<10%, loses track of time) (5)    Stefany Fagan CCC-SLP  5/10/2017

## 2017-05-10 NOTE — PROGRESS NOTES
Physical Therapy   Treatment/Weekly Reassessment    Antonina Butler   MRN: [id number]                05/10/17 1300   PT Time Calculation   PT Start Time 1300   PT Stop Time 1345   PT Total Time (min) 45 min   Treatment   Treatment Type Treatment;Other (see comments)  (Weekly Reassessment)   PT/PTA PT   General   PT Received On 05/10/17   Family/Caregiver Present Yes  (multiple family)   Patient Found (position) Seated in wheelchair;Other (comment)  (in room)   Precautions   General Precautions fall;hearing impaired;vision impaired   Visual/Auditory Hearing impaired;Vision impaired;Other (see comments)  (glasses)   Subjective   Patient states no complaints.   Pain/Comfort   Pain Rating no pain   Pain Rating Post-Intervention no pain   Bed Mobility   Bed Mobility yes   Rolling/Turning to Left Modified independent;Other (see comments)  (mat)   Rolling/Turning Right Modified independent;Other (see comments)  (mat)   Supine to Sit Modified Independent   Supine to Sit Comments x 1 on mat   Sit to Supine Modified Independent;Other (see comments)  (mat)   Transfers   Transfer yes   Sit to Stand   Sit <> Stand Assistance Stand By Assistance   Sit <> Stand Assistive Device No Assistive Device;Rolling Walker   Trials/Comments multiple trials   Stand to Sit   Assistance Stand By Assistance   Assistive Device No Assistive Device;Rolling Walker   Trials/Comments multiple trials   Chair to Mat   Chair<> Mat Technique Stand Pivot   Chair<>Mat Assistance Stand By Assistance;Supervision   Chair <> Mat Assistive Device No Assistive Device   Trials/Comments 1 trial   Mat to Chair   Technique Stand Pivot   Assistance Stand By Assistance;Supervision   Assistive Device No Assistive Device   Trials/Comments 1 trial   Tub Bench Transfer   Technique Stand Pivot   Assistance Contact Guard Assistance   Assistive Device Other (see comments)  (grab bar)   Trials/Comments 1 trial~ pt steps into and out of tub with use of grab bar for stability    Wheelchair Activities   Propulsion Yes   Propulsion Type 1 Manual   Level 1 Level tile   Method 1 Right upper extremity;Left upper extremity   Level of Assistance 1 Modified Independent   Description/ Details 1 200 feet   Gait   Gait Yes   Weight Bearing Status full   Gait 1   Surface 1 Level tile   Gait Assistive Device Rolling walker   Assistance 1 Contact Guard Assistance   Gait Distance 220 feet   Gait Pattern reciprocal   Gait Deviation(s) decreased duran;increased time in double stance;decreased velocity of limb motion;decreased step length;decreased swing-to-stance ratio;decreased toe-to-floor clearance;decreased weight-shifting ability;other (see comments)  (B knee flex in stance; mild R knee instability)   Impairments Contributing to Gait Deviations impaired balance;impaired motor control;abnormal muscle tone;impaired postural control;decreased strength;other (see comments)  (decreased cognition and attention to task)   Stairs   Stairs Yes   Stairs/Curb Step   Rails 1 Bilateral   Device 1 No device   Assistance 1 Contact guard   Comment/# Steps 1 pt manages 12 steps with step-to pattern   Stairs/ Curb Step  2   Rails 2 None (Comment)   Device 2 Rolling walker   Assistance 2 Contact Guard Assistance   Comment/# Steps 2 pt ascends and descends 4 inch curb x 1 trial   Equipment Use   Comments pt performs leg press activity x 10 minutes with R LE only( blue/green theratubing used for resistance) to encourage R knee co-contraction of hams/quads   Other Activities   Other Activities Other (Comment)  (stand pivot transfers w/c<> 3 in 1 commode with SBA/s)   Activity Tolerance   Activity Tolerance Patient tolerated treatment well   Other Comments   Comments PT began discussion of DME needs with pt today~ possible items needed for discharge include RW, shower chair, 3 in 1 commode and perhaps w/c for long distances.   After Treatment   Patient Position After Treatment Seated in wheelchair;Other (comment)  (posey  belt in place)   Patient after treatment left (pt propels w/c back to her room)   Assessment   Prognosis Good   Problem List Decreased strength;Decreased endurance;Impaired balance;Decreased mobility;Decreased cognition;Impaired vision;Impaired hearing   Session Assessment progressing toward goals   Assessment Pt is doing very well in PT and continues to demonstrate functional improvements.  Pt's R knee control is also looking better and should continue to improve with additional PT intervention.  Pt is very much on target to reach all goals by next Tuesday( pt's current discharge date).   Level of Motivation/Participation good   Barriers to Discharge None   Short Term Goals   Supine to Sit-Met/Not Met Met   Sit to Supine - Met/Not Met Met   Transfer Sit to stand - Met/Not Met Met   Transfer Bed/Chair - Met/Not Met Met   Ambulate - Met/Not Met Met   Go Up/Down Stairs - Met/Not Met Met   Go Up/Down Curb- Met/Not Met Met   Stand - Met/Not Met Met   Tolerate Exercise - Met/Not Met Met   Propel Wheelchair - Met/Not Met Met   Other Goal Met   Other Goal 2 Met   Other Goal 3 Met   Discharge Recommendations   Equipment Needed After Discharge 3-in-1 commode;walker, rolling;shower chair;other (see comments)  (w/c????)   Discharge Facility/Level Of Care Needs outpatient PT   Plan   Planned Therapy Intervention Continue with current plan;Bed mobility training;Transfer training;Gait training;Balance Training;ROM;Strengthening;Neuromuscular Re-education;Motor Coordination Training;Postural Re-education;Wheelchair Management/Propulsion   Therapy Frequency 2 times/day;daily;Monday-Friday;Saturday or Sunday   Physical Therapy Follow-up   PT Follow-up? Yes   PT - Next Visit Date 05/11/17   Treatment/Billable Minutes   Therapeutic Activity 35   Therapeutic Exercise 10   Total Time 45           Kelvin Sung, PT 5/10/2017

## 2017-05-10 NOTE — PROGRESS NOTES
Physical Therapy  Transfer Group Treatment    Antonina Butler   MRN: [id number]      05/10/17 1500   PT Time Calculation   PT Start Time 1500   PT Stop Time 1545   PT Total Time (min) 45 min   Treatment   Treatment Type Treatment   PT/PTA PT   General   PT Received On 05/10/17   Missed Time Reason Not applicable   Family/Caregiver Present No   Patient Found (position) Seated in wheelchair   Precautions   General Precautions fall;hearing impaired   Visual/Auditory Hearing impaired   Subjective   Patient states Pt agreeable to group treatment   Pain/Comfort   Pain Rating no pain   Other Comments   Comments Patient participated in 45 minute functional transfers group. The group activity challenged bilateral upper extremity and lower extremity strengthening. In addition, cardiovascular and functional endurance were challenged during this group. Patient completed wheelchair <> mat transfer with STPT and CGA. Pt also completed bed mobility from supine to sitting edge of mat with SPV. Pt required CGA for w/c management parts. Patient completed toilet transfer with CGA and STPT without use of RW. Patient completed tub transfer with CGA and without use of RW. Patient demonstrated appropriate safety awareness with functional transfers. Educated patient on compensatory and adaptive techniques for functional home transfers. Patient demonstrated increased independence with all transfers. These activities were performed in a group setting to encourage increased participation with peers and social interaction skills.   After Treatment   Patient Position After Treatment Seated in wheelchair   Patient after treatment left (propelled herself to room)   Treatment/Billable Minutes   Therapeutic Activity Group 45   Total Time 45   Nay Torres DPT  5/10/2017

## 2017-05-10 NOTE — PROGRESS NOTES
Physical Therapy  FIM scores    Antonina Butler   MRN: [id number]                05/10/17 1340   Transfers   Bed/Chair/WC 5   Toilet 5   Tub 4   Locomotion   Distance Walked 3   Distance Wheelchair 3   Walk 4   Wheelchair 6   Mode C   Stairs 4         Kelvin Sung, PT 5/10/2017

## 2017-05-10 NOTE — PROGRESS NOTES
"Occupational Therapy   AM Treatment    Antonina Butler   MRN: [id number]      05/10/17 1015   OT Time Calculation   OT Start Time 1015   OT Stop Time 1100   OT Total Time (min) 45 min   General   OT Date of Treatment 05/10/17   Precautions   General Precautions fall;hearing impaired   Visual/Auditory Hearing impaired;Vision impaired  (glasses)   Subjective   Patient states "My knee is getting much better"   Pain/Comfort   Location - Side Right   Location hip   Bed Mobility   Supine to Sit Modified Independent   Sit to Supine Modified Independent   Transfers   Transfer yes   Sit to Stand   Sit <> Stand Assistance Stand By Assistance   Sit <> Stand Assistive Device Rolling Walker   Stand to Sit   Assistance Supervision   Assistive Device Rolling Walker   Chair to Mat   Chair <>Mat Technique Stand Pivot   Chair <> Mat Assistance Stand By Assistance   Chair<> Mat Assistive Device Rolling Walker   Mat to Chair   Technique Stand Pivot   Assistance Stand By Assistance   Assistive Device Rolling Walker   Toilet Transfer   Toilet Transfer Technique Stand Pivot   Toilet Transfer Assistance Stand By Assistance   Toilet Transfer Assistive Device Rolling Walker   LE Dressing   LE Dressing Level of Assistance Stand by assistance   Sock Level of Assistance Stand by assistance   Shoe Level of Assistance Modified independent   LE Dressing Where Assessed Wheelchair   Additional Activities   Additional Activities Other (Comment)   Additional Activities Comments  - Pt participated in functional standing activity, transferring grocery items from overhead cabinet onto countertop using R UE, no LOB noted with activity.    - Pt completed functional mobility in rehab gym using RW with SBA-CGA to navigate around obstacles, over small objects, to BSC, and around corners in prep for household mobility and IADLs. Pt demo mild knee buckling during pivots and as pt fatigued with ambulation.    - Bridges in supine to promote hip extension x 10 " reps, single leg bridges (RLE only) x 10 reps   Activity Tolerance   Activity Tolerance Patient tolerated treatment well   Medical Staff Made Aware NSG   After Treatment   Patient Position After Treatment Seated in wheelchair   Patient after treatment left call button in reach   Assessment   Prognosis Good   Problem List Decreased Self Care skills;Decreased upper extremity strength;Decreased safe judgment during ADL;Decreased endurance;Decreased functional mobility;Decreased IADLs;Decreased trunk control for functional activities;Decreased Function of right upper extremity;Decreased gross motor control   Assessment Pt showing great improvement in functional activities today, however still demo decreased safety awareness at times and ocassional knee buckling especially as leg fatigues.    Level of Motivation/Participation good   Discharge Recommendations   Equipment Needed After Discharge 3-in-1 commode;walker, rolling;shower chair   Discharge Facility/Level Of Care Needs outpatient OT   Plan   Plan Continue with current plan   Therapy Frequency 2 times/day;Monday-Friday;Saturday or Sunday   Occupational Therapy Follow-up   OT Follow-up? Yes   Treatment/Billable Minutes   Therapeutic Activity 45   Total Time 45     BRI Gomez   5/10/2017

## 2017-05-10 NOTE — PROGRESS NOTES
Admit Date: 5/2/2017   LOS: 8 days     HPI, PFSH reviewed -- No change from admission.    Interval History/ROS: No acute events over night. Tolerating therapy, denied pain or discomfort. PO intake good, slept well over night. Remains on dental soft, will F/U w ST to upgrade to Reg.   - No shortness of breath, chest pain, nausea, or vomiting      Functional: mod I for bed mobility, SBA for sit to stand (improved), Supervision for transfers (improved)   - Gait 231' MNA/CGA RW 2/2 right knee hyperextension   - Mod I w eating, Supervision w grooming, setup for UBD (improved), CGA for LBD, CGA toileting (improved)  Cog: Sup for memory, Mod I with prob solving, Ind with comp/Exp       Physical Exam:  Vital Signs Range (Last 24H):  Temp:  [97.9 °F (36.6 °C)-98.3 °F (36.8 °C)]   Pulse:  [56-65]   Resp:  [17-18]   BP: (119-132)/(70-78)   SpO2:  [94 %-95 %]   General Appearance:  In no acute distress and comfortable, sitting u pin bed    - Vital Signs reviewed  Neuro/Cognition: Alert, Awake, Oriented to person, place, situation    - Followed all commands   Psych/Affect: Appropriate   Cardiac: Normal rate   Pulmonary: No labored breathing, on RA   Abdomen: soft, non-tender, and not distended     : Voiding spontaneously, No momin   Extremities: No calf tenderness, no local swelling   Skin: Intact, Warm  Musculoskeletal: LUE/LLE WFL  RUE: SA 5/5, EF, EE,  5/5  RLE HF 3/5, KE 3/5, DF/PF 5/5    Scheduled Medications:   amlodipine  5 mg Oral Daily    aspirin  81 mg Oral Daily    atorvastatin  40 mg Oral Daily    docusate sodium  100 mg Oral BID    enoxaparin  40 mg Subcutaneous Daily       PRN Medications:   acetaminophen, bisacodyl, ondansetron    Review of patient's allergies indicates:  No Known Allergies    Recent Laboratory Results:  Lab Results   Component Value Date    CREATININE 0.9 05/03/2017    BUN 17 05/03/2017     05/03/2017    K 4.0 05/03/2017     05/03/2017    CO2 31 (H) 05/03/2017     Lab  Results   Component Value Date    WBC 4.74 05/03/2017    HGB 13.1 05/03/2017    HCT 40.7 05/03/2017    MCV 94 05/03/2017     05/03/2017     No results found for: PTT      Evaluation:   Antonina Butler is a 71 y.o. female admitted to inpatient rehabilitation on 5/2/2017 for stroke with impaired mobility and ADLs. Patient remains appropriate for PT, OT, and as required Speech therapy. Patient continues to require 24 hour nursing care as well as daily Physician assessment.     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, mild cog deficits, dysphagia  - cont w ASA/statin   - F/U with Neuro as OP   - cont w PT, OT, Speech  -- improving     HTN -- improved control past 24h  - cont w Amlodipine daily  - monitor BP/HR closely as there can be fluctuation with initiation of therapy   5/10 VSS, cont to monitor closely, and w current mgmt      Pain: Will monitor closely and adjust as necessary   - Tylenol 650mg Q6 hrs PRN     Monitoring:    Nutrition/Swallow Status: Dental Soft Liquid Diet Level: Thin Feed   - Prealbumin -   22  - Nutritionist on board   Bladder Management: continent  Bowel Management: continent  - Colace BID and Suppository PRN   - Will monitor for regularity   Mood: stable   Sleep: monitor; Will consider sleep aid as clinically indicated   Skin: Monitor   DVT ppx: Lovenox 40mg daily     Discharge Planning:  Tentative Discharge Date: 5/16  Tentative DC location: Home with Family     No future appointments.      Arlene Ramirez MD  [facility]

## 2017-05-10 NOTE — PROGRESS NOTES
Occupational Therapy   FIM Scores    Antonina Butler   MRN: [id number]      05/10/17 1015   Transfers   Bed/Chair/WC 5   Toilet 5   Self Care   Dressing-Lower 5     BRI Gomez   5/10/2017

## 2017-05-11 PROCEDURE — 99232 SBSQ HOSP IP/OBS MODERATE 35: CPT | Mod: ,,, | Performed by: PHYSICAL MEDICINE & REHABILITATION

## 2017-05-11 PROCEDURE — 97535 SELF CARE MNGMENT TRAINING: CPT

## 2017-05-11 PROCEDURE — 12800000 HC REHAB SEMI-PRIVATE ROOM

## 2017-05-11 PROCEDURE — 63600175 PHARM REV CODE 636 W HCPCS: Performed by: HOSPITALIST

## 2017-05-11 PROCEDURE — 25000003 PHARM REV CODE 250: Performed by: HOSPITALIST

## 2017-05-11 PROCEDURE — 97150 GROUP THERAPEUTIC PROCEDURES: CPT

## 2017-05-11 PROCEDURE — 97112 NEUROMUSCULAR REEDUCATION: CPT

## 2017-05-11 PROCEDURE — 97116 GAIT TRAINING THERAPY: CPT

## 2017-05-11 PROCEDURE — 92507 TX SP LANG VOICE COMM INDIV: CPT

## 2017-05-11 PROCEDURE — 97530 THERAPEUTIC ACTIVITIES: CPT

## 2017-05-11 PROCEDURE — 25000003 PHARM REV CODE 250: Performed by: PHYSICAL MEDICINE & REHABILITATION

## 2017-05-11 PROCEDURE — 92610 EVALUATE SWALLOWING FUNCTION: CPT

## 2017-05-11 RX ADMIN — ATORVASTATIN CALCIUM 40 MG: 20 TABLET, FILM COATED ORAL at 09:05

## 2017-05-11 RX ADMIN — DOCUSATE SODIUM 100 MG: 100 CAPSULE, LIQUID FILLED ORAL at 09:05

## 2017-05-11 RX ADMIN — AMLODIPINE BESYLATE 5 MG: 5 TABLET ORAL at 09:05

## 2017-05-11 RX ADMIN — ENOXAPARIN SODIUM 40 MG: 100 INJECTION SUBCUTANEOUS at 05:05

## 2017-05-11 RX ADMIN — ASPIRIN 81 MG: 81 TABLET, COATED ORAL at 09:05

## 2017-05-11 NOTE — PROGRESS NOTES
Occupational Therapy   FIM Scores    Antonina Butler   MRN: [id number]      05/11/17 0830   Transfers   Bed/Chair/WC 5   Shower 5   Self Care   Grooming 6   Bathing 5   Dressing-Upper 6   Dressing-Lower 5     BRI Gomez   5/11/2017

## 2017-05-11 NOTE — PROGRESS NOTES
Admit Date: 5/2/2017   LOS: 9 days     HPI, PFSH reviewed -- No change from admission.    Interval History/ROS: No acute events over night. Tolerating therapy, denied pain or discomfort.    - No shortness of breath, chest pain, nausea, or vomiting      Functional: mod I for bed mobility, SBA for sit to stand (improved), Supervision for transfers (improved), WC prop Mod I    - Gait 220' CGA RW , mild R knee instability   - Mod I w eating, Supervision w grooming, setup for UBD (improved), CGA for LBD, CGA toileting (improved)  Cog: Mod I for memory, prob solving, SI; Ind with comp/Exp       Physical Exam:  Vital Signs Range (Last 24H):  Temp:  [97.6 °F (36.4 °C)]   Pulse:  [56-61]   Resp:  [18-20]   BP: (129-138)/(74-81)   SpO2:  [94 %-97 %]   General Appearance:  In no acute distress and comfortable, sitting up in WC   - Vital Signs reviewed  Neuro/Cognition: Alert, Awake, Oriented to person, place, situation    - Followed all commands   Psych/Affect: Appropriate   Cardiac: Normal rate   Pulmonary: No labored breathing, on RA   Abdomen: soft, non-tender, and not distended     : Voiding spontaneously, No momin   Extremities: No calf tenderness, no local swelling   Skin: Intact, Warm  Musculoskeletal: LUE/LLE WFL  RUE: SA 5/5, EF, EE,  5/5  RLE HF 3/5, KE 3/5, DF/PF 5/5    Scheduled Medications:   amlodipine  5 mg Oral Daily    aspirin  81 mg Oral Daily    atorvastatin  40 mg Oral Daily    docusate sodium  100 mg Oral BID    enoxaparin  40 mg Subcutaneous Daily       PRN Medications:   acetaminophen, bisacodyl, ondansetron    Review of patient's allergies indicates:  No Known Allergies    Recent Laboratory Results:  Lab Results   Component Value Date    CREATININE 0.9 05/03/2017    BUN 17 05/03/2017     05/03/2017    K 4.0 05/03/2017     05/03/2017    CO2 31 (H) 05/03/2017     Lab Results   Component Value Date    WBC 4.74 05/03/2017    HGB 13.1 05/03/2017    HCT 40.7 05/03/2017    MCV 94  05/03/2017     05/03/2017     No results found for: PTT      Evaluation:   Antonina Butler is a 71 y.o. female admitted to inpatient rehabilitation on 5/2/2017 for stroke with impaired mobility and ADLs. Patient remains appropriate for PT, OT, and as required Speech therapy. Patient continues to require 24 hour nursing care as well as daily Physician assessment.     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, mild cog deficits, dysphagia  - cont w ASA/statin   - F/U with Neuro as OP   - cont w PT, OT, Speech  -- improving     HTN --   - cont w Amlodipine daily  - monitor BP/HR closely as there can be fluctuation with initiation of therapy   5/11 VSS, cont to monitor closely, and w current mgmt      Pain: Will monitor closely and adjust as necessary   - Tylenol 650mg Q6 hrs PRN     Monitoring:    Nutrition/Swallow Status: 5/11 upgraded to Reg/thin    - Prealbumin -   22  - Nutritionist on board   Bladder Management: continent  Bowel Management: continent  - Colace BID and Suppository PRN   - Will monitor for regularity   Mood: stable   Sleep: monitor; Will consider sleep aid as clinically indicated   Skin: Monitor   DVT ppx: Lovenox 40mg daily     Discharge Planning:  Tentative Discharge Date: 5/16  Tentative DC location: Home with Family     No future appointments.      Arlene Ramirez MD  [facility] [place]

## 2017-05-11 NOTE — PROGRESS NOTES
"Occupational Therapy   AM Treatment/Weekly Re-assessment    Antonina Butler   MRN: [id number]      05/11/17 0830   OT Time Calculation   OT Start Time 0830   OT Stop Time 0915   OT Total Time (min) 45 min   General   OT Date of Treatment 05/11/17   Family/Caregiver Present No   Patient Found (position) Seated in wheelchair   Precautions   General Precautions fall   Visual/Auditory Hearing impaired;Vision impaired   Subjective   Patient states "I wish y'all would let me go home early"   Pain/Comfort   Pain Rating no pain   Sit to Stand   Sit <> Stand Assistance Supervision;Stand By Assistance   Sit <> Stand Assistive Device Rolling Walker   Stand to Sit   Assistance Supervision   Assistive Device Rolling Walker   Shower Transfer   Shower Transfer Technique Stand Pivot   Shower Transfer Assistance Stand By Assistance   Shower Transfer Assistive Device Rolling Walker   Grooming   Hair Grooming Level of Assistance Modified independent   Grooming Where Assessed Wheelchair   Bathing   Bathing Level of Assistance Stand by assistance   Bathing Where Assessed tub bench;Shower   Bathing Comments cues for safety awareness during sit-stand to complete yanet hygiene   UE Dressing   UE Dressing Level of Assistance Modified independent   UE Dressing Where Assessed Wheelchair   LE Dressing   LE Dressing  Level of Assistance Stand by assistance   LE Dressing Level of Assistance Stand by assistance   Shoe Level of Assistance Modified independent   LE Dressing Where Assessed Wheelchair   Additional Activities   Additional Activities Other (Comment)   Additional Activities Comments    Activity Tolerance   Activity Tolerance Patient tolerated treatment well   Medical Staff Made Aware NSG   After Treatment   Patient Position After Treatment Seated in wheelchair   Patient after treatment left call button in reach   Assessment   Prognosis Good   Problem List Decreased Self Care skills;Decreased upper extremity strength;Decreased safe " judgment during ADL;Decreased cognition;Decreased endurance;Decreased functional mobility;Decreased gross motor control;Decreased IADLs;Decreased Function of right upper extremity;Decreased trunk control for functional activities;Decreased sensation   Assessment Pt progressing, noted increased (I) with bathing and dressing today. Pt still needs cues for safety awareness and RW management.    Level of Motivation/Participation good   Short Term Goals   Pt Will Perform Supine To Sit Supervision   Supine to Sit - Met/Not Met Met   Pt Will Perform Sit to Supine Supervision   Supine to Sit - Met/Not Met Met   Pt Will Transfer Bed/Chair With minimal assist   Transfer Bed/Chair - Met/Not Met Met   Pt Will Transfer To Bedside Commode With minimal assist   Transfer Bedside Commode -Met/Not Met Met   Pt Will Transfer To Shower With minimal assist   Transfer to Shower-Met/Not Met Met   Pt Will Perform Bathing With contact guard assistance   Bathing - Met/Not Met Met   Pt Will Perform UE Dressing With supervision   UE Dressing - Met/Not Met Met   Pt Will Perform LE Dressing With minimal assistance   LE Dressing - Met/Not Met Met   Discharge Recommendations   Equipment Needed After Discharge 3-in-1 commode;walker, rolling;shower chair   Discharge Facility/Level Of Care Needs outpatient OT   Plan   Plan Continue with current plan   Therapy Frequency 2 times/day;Monday-Friday;Saturday or Sunday   Occupational Therapy Follow-up   OT Follow-up? Yes   Treatment/Billable Minutes   Self Care/Home Management 45   Total Time 45     BRI Gomez   5/11/2017

## 2017-05-11 NOTE — PROGRESS NOTES
"Speech Therapy   Treatment/ Dysphagia Evaluation    Antonina Butler   MRN: [id number]        05/11/17 1030   Speech Time Calculation   Speech Start Time 1030   Speech Stop Time 1115   Speech Total (min) 45 min   General Information   SLP Treatment Date 05/11/17   General Observations Pt seen at individually in ST office.   General Precautions fall   Visual/Auditory Hearing impaired;Vision impaired  (glasses)   Subjective   Patient states pt stated "I only had a soft diet because of my dentures. It doesn't bother me."   Pain/Comfort   Pain Rating no pain       SLP Cognitive Treatment   Treatment Detail (SLP Cognitive Treatment) BSE completed. Pt without current oral or pharyngeal dysphagia. Pt tolerated solid consistency and thin liquids without overt s/s of aspiration and without oral deficits. Recommend diet upgrade to regular diet with continued thin liquids. No further dysphagia tx recommended.     Pt completed written 5 step sequencing task with 80% acc indly.given min cues for attention to specific details pt with increase to 100% acc. Simple math calculations task completed with 80% acc indly as well 2' deficits of attention skills. Pt with increase to 100% acc given cues for increased focus. Pt completed complex deductive reasoning task indly with 100% acc. Pt noted with increased attention within last task of session. Discussed with pt recommendations of continued skilled ST services via outpatient setting, pt agreeable.    Assessment   SLP Diagnosis mild cog deficits   Prognosis Good   Problem List decreased safety awareness skills; decreased attention   Session Assessment progressing toward goals   Level of Motivation/Participation good   Recommendations   Solid Diet Level Regular   Liquid Diet Level Thin   Discharge Recommendations   Discharge Facility/Level Of Care Needs outpatient speech therapy   Plan   Plan Continue with current plan   Therapy Frequency Monday-Friday   SLP Follow-up   SLP Follow-up? " Yes   Treatment/Billable Minutes   Speech Therapy Individual 35   Eval Swallow and Oral Function 10   Total Time 45     FIM Scores  Comprehension:7  Expression: 7  Social Interaction:6  Problem Solvin  Memory: 5      Comprehension: Complex= humor, finances, rationale for medical treatment(hip precautions, pressure relief)  Basic= pain, hunger, thirst, bathroom needs, cold, nutrition, sleep      Understands complex/abstract conversation/directions. (7)      Expression:  Expresses Complex / abstract ideas. (7)      Social Interaction:  Interacts appropriately with others with medication or extra time(anti-anxiety, antidepressant) (6)      Problem Solving:  Solves complex problems with extra time (6)      Memory:  Recognizes, recalls, or executes 3 steps of 3 step request 90% of time (cueing, reminders<10%, loses track of time) (5)    Stefany Fagan CCC-SLP  2017

## 2017-05-11 NOTE — PROGRESS NOTES
Physical Therapy  FIM scores    Antonina Butler   MRN: [id number]                05/11/17 1520   Transfers   Bed/Chair/WC 5   Locomotion   Distance Walked 3   Distance Wheelchair 3   Walk 5   Wheelchair 6   Mode C   Stairs 5             Kelvin Sung, PT 5/11/2017

## 2017-05-11 NOTE — PROGRESS NOTES
Physical Therapy   Treatment    Antonina Butler   MRN: [id number]              05/11/17 1431   PT Time Calculation   PT Start Time 1431   PT Stop Time 1516   PT Total Time (min) 45 min   Treatment   Treatment Type Treatment   PT/PTA PT   General   PT Received On 05/11/17   Family/Caregiver Present No   Patient Found (position) Seated in wheelchair;Other (comment)  (in room)   Pt found with (posey belt)   Precautions   General Precautions fall   Visual/Auditory Hearing impaired;Vision impaired;Other (see comments)  (glasses)   Subjective   Patient states no complaints.   Pain/Comfort   Pain Rating no pain   Pain Rating Post-Intervention no pain   Bed Mobility   Bed Mobility no   Transfers   Transfer yes   Sit to Stand   Sit <> Stand Assistance Supervision   Sit <> Stand Assistive Device Rolling Walker;No Assistive Device   Trials/Comments multiple trials   Stand to Sit   Assistance Supervision   Assistive Device No Assistive Device;Rolling Walker   Trials/Comments multiple trials   Chair to Mat   Chair<> Mat Technique Stand Pivot   Chair<>Mat Assistance Supervision   Chair <> Mat Assistive Device No Assistive Device   Trials/Comments 1 trial~ cues for technique   Mat to Chair   Technique Stand Pivot   Assistance Supervision   Assistive Device No Assistive Device   Trials/Comments 1 trial~ cues for technique   Wheelchair Activities   Propulsion Yes   Propulsion Type 1 Manual   Level 1 Level tile   Method 1 Right upper extremity;Left upper extremity   Level of Assistance 1 Modified Independent   Description/ Details 1 200 feet   Gait   Gait Yes   Weight Bearing Status full   Gait 1   Surface 1 Level tile   Gait Assistive Device Rolling walker   Assistance 1 Stand by Assistance   Gait Distance 223 feet x 2 trials~ seated rest break between trials   Gait Pattern reciprocal   Gait Deviation(s) decreased duran;increased time in double stance;decreased velocity of limb motion;decreased step length;decreased stride  "length;decreased toe-to-floor clearance;decreased weight-shifting ability;decreased swing-to-stance ratio;other (see comments)  (mild R knee instability)   Impairments Contributing to Gait Deviations impaired balance;impaired motor control;impaired postural control;decreased strength;other (see comments)  (decreased cognition and attention to task)   Stairs   Stairs Yes   Stairs/Curb Step   Rails 1 Bilateral   Device 1 No device   Assistance 1 Stand by Assistance   Comment/# Steps 1 pt manages 12 six inch steps with R LE leading to ascend and L LE leading to descend   Balance   Balance No   Other Activities   Other Activities Other (Comment)  (Neuro reeducation activites performed )   Comments pt performs R LE muscular reeducation by placing R LE on 6 " step and raising body up and down with some degree of knee flex( multiple trials performed with CGA from PT);  Pt also performs unilateral standing balance on R LE by placing L foot on dynadisc placed in front and to L~ mod A for balance and multiple cues due to pt's inability to consistently follow instructions and maintain balance   Activity Tolerance   Activity Tolerance Patient tolerated treatment well   After Treatment   Patient Position After Treatment Seated in wheelchair   Patient after treatment left (pt propels w/c back to her room)   Assessment   Prognosis Good   Problem List Decreased strength;Decreased endurance;Impaired balance;Decreased mobility;Decreased cognition;Impaired hearing;Impaired vision   Session Assessment progressing toward goals   Assessment Pt continues to make good progress in PT, particularly with R knee control during functional tasks.  Pt's cognitive deficits, while mild, do interfere at times when PT has pt perform any task which requires explanation and instruction.  Pt should continue to advance with additional PT intervention and is on target for discharge next Tuesday.   Level of Motivation/Participation good   Barriers to " Discharge None   Barriers to Discharge Comments  will assist at time of discharge   Discharge Recommendations   Equipment Needed After Discharge 3-in-1 commode;walker, rolling;shower chair   Discharge Facility/Level Of Care Needs outpatient PT   Plan   Planned Therapy Intervention Continue with current plan;Bed mobility training;Transfer training;Gait training;Balance Training;ROM;Strengthening;Neuromuscular Re-education;Motor Coordination Training;Postural Re-education;Wheelchair Management/Propulsion   Therapy Frequency 2 times/day;daily;Monday-Friday;Saturday or Sunday   Physical Therapy Follow-up   PT Follow-up? Yes   PT - Next Visit Date 05/12/17   Treatment/Billable Minutes   Gait training 10   Therapeutic Activity 15   Neuromuscular Re-education 20   Total Time 45           Kelvin Sung, PT 5/11/2017

## 2017-05-11 NOTE — PROGRESS NOTES
Physical Therapy   Amb Group    Antonina Butler   MRN: [id number]   PTA visit #: 1     05/11/17 0945   PT Time Calculation   PT Start Time 0945   PT Stop Time 1030   PT Total Time (min) 45 min   Treatment   Treatment Type Treatment   PT/PTA PTA   PTA Visit Number 1   General   PT Received On 05/11/17   Treatment/Billable Minutes   Therapeutic Activity Group 45   Total Time 45   Patient participated in a 45-minute ambulation and cardiovascular endurance group. Additionally, strength activities were performed and education regarding assistive device fit and management was provided. The patient performed one, six-minute bout of ambulation with_sba__ assist and ambulated __285___ft. The patient also performed 6 sets of 15 seated strength therapeutic exercises including long arc quads, seated marches, and wheelchair pushups.  The patient also performed obstacle negotiation with assistive device to simulate home going and community negotiations.  Patient performed 15 times sit to stand with ___sba__assist]. Patient also negotiated 12 steps with __cga____ assistance. All activities were performed in a group setting to encourage increased participation with peers and social interaction.          Bernie Akins, PTA  5/11/2017

## 2017-05-12 PROCEDURE — 97537 COMMUNITY/WORK REINTEGRATION: CPT

## 2017-05-12 PROCEDURE — 92507 TX SP LANG VOICE COMM INDIV: CPT

## 2017-05-12 PROCEDURE — 12800000 HC REHAB SEMI-PRIVATE ROOM

## 2017-05-12 PROCEDURE — 63600175 PHARM REV CODE 636 W HCPCS: Performed by: HOSPITALIST

## 2017-05-12 PROCEDURE — 97803 MED NUTRITION INDIV SUBSEQ: CPT

## 2017-05-12 PROCEDURE — 25000003 PHARM REV CODE 250: Performed by: PHYSICAL MEDICINE & REHABILITATION

## 2017-05-12 PROCEDURE — 25000003 PHARM REV CODE 250: Performed by: HOSPITALIST

## 2017-05-12 PROCEDURE — 97530 THERAPEUTIC ACTIVITIES: CPT

## 2017-05-12 PROCEDURE — 97112 NEUROMUSCULAR REEDUCATION: CPT

## 2017-05-12 PROCEDURE — 97116 GAIT TRAINING THERAPY: CPT

## 2017-05-12 PROCEDURE — 99233 SBSQ HOSP IP/OBS HIGH 50: CPT | Mod: ,,, | Performed by: PHYSICAL MEDICINE & REHABILITATION

## 2017-05-12 PROCEDURE — 97150 GROUP THERAPEUTIC PROCEDURES: CPT

## 2017-05-12 RX ADMIN — DOCUSATE SODIUM 100 MG: 100 CAPSULE, LIQUID FILLED ORAL at 08:05

## 2017-05-12 RX ADMIN — AMLODIPINE BESYLATE 5 MG: 5 TABLET ORAL at 08:05

## 2017-05-12 RX ADMIN — ASPIRIN 81 MG: 81 TABLET, COATED ORAL at 08:05

## 2017-05-12 RX ADMIN — ENOXAPARIN SODIUM 40 MG: 100 INJECTION SUBCUTANEOUS at 05:05

## 2017-05-12 RX ADMIN — ATORVASTATIN CALCIUM 40 MG: 20 TABLET, FILM COATED ORAL at 08:05

## 2017-05-12 NOTE — PROGRESS NOTES
[facility]  Adult Nutrition  Progress Note    SUMMARY     Recommendations    Recommendation/Intervention: 1. Continue current diet 2. RD to monitor  Goals: PO intake >50% meals  Nutrition Goal Status: new      Reason for Assessment    Reason for Assessment: RD follow-up  Diagnosis: stroke/CVA  Relevent Medical History: HTN, CVA, dysphagia   Interdisciplinary Rounds: did not attend               Nutrition Prescription Ordered        Current diet order: Regular        Oral Nutrition Supplement: none     Evaluation of Received Nutrients/Fluid Intake                   Energy Calories Required: meeting needs        Protein Required: meeting needs          Fluid Required: meeting needs     Tolerance: tolerating     Nutrition Risk Screen     Nutrition Risk Screen: no indicators present    Nutrition/Diet History    Patient Reported Diet/Restrictions/Preferences: general     Food Preferences: no cultural or Muslim food preferences identified       Labs/Tests/Procedures/Meds       Pertinent Labs Reviewed: reviewed, pertinent  Pertinent Labs Comments: PAB 22  Pertinent Medications Reviewed: reviewed, pertinent  Pertinent Medications Comments: asa, atorvastatin, docusate sodium    Physical Findings    Overall Physical Appearance:  (WNL)        Skin: intact    Anthropometrics       Height (inches): 61.97 in  Weight Method: Bed Scale  Weight (kg): 50.3 kg     Ideal Body Weight (IBW), Female: 109.85 lb     % Ideal Body Weight, Female (lb): 100.95 lb  BMI (kg/m2): 20.3  BMI Grade: 18.5-24.9 - normal     Estimated/Assessed Needs    Weight Used For Calorie Calculations: 50.3 kg (110 lb 14.3 oz)   Height (cm): 157.4 cm     Energy Need Method: Kcal/kg, Vilas-St Jeor (1352-1509kcal/day)        RMR (Vilas-St. Jeor Equation): 966.09        Weight Used For Protein Calculations: 50.3 kg (110 lb 14.3 oz)  Protein Requirements: 40-50g/day    Fluid Need Method: RDA Method (1 ml per kcal or per MD)       Assessment  and Plan    No new Assessment & Plan notes have been filed under this hospital service since the last note was generated.  Service: Nutrition    Monitor and Evaluation    Food and Nutrient Intake: energy intake, food and beverage intake  Food and Nutrient Adminstration: diet order        Anthropometric Measurements: weight, weight change  Biochemical Data, Medical Tests and Procedures: electrolyte and renal panel  Nutrition-Focused Physical Findings: overall appearance  % Intake of Estimated Energy Needs: 75 - 100 %  % Meal Intake: 100%    Nutrition Risk    Level of Risk: other (see comments) (once weekly follow up)    Nutrition Follow-Up    RD Follow-up?: Yes

## 2017-05-12 NOTE — PROGRESS NOTES
"Physical Therapy   Treatment    Antonina Butler   MRN: [id number]              05/12/17 1300   PT Time Calculation   PT Start Time 1300   PT Stop Time 1346   PT Total Time (min) 46 min   Treatment   Treatment Type Treatment   PT/PTA PT   General   PT Received On 05/12/17   Family/Caregiver Present Yes  ()   Patient Found (position) Seated in wheelchair;Other (comment)  (in room)   Pt found with (posey belt)   Precautions   General Precautions fall;hearing impaired;vision impaired   Visual/Auditory Hearing impaired;Vision impaired;Other (see comments)  (glasses)   Subjective   Patient states " That [employer] game was fun."   Pain/Comfort   Pain Rating no pain   Pain Rating Post-Intervention no pain   Bed Mobility   Bed Mobility yes   Supine to Sit Modified Independent   Supine to Sit Comments x 1 on mat   Sit to Supine Modified Independent;Other (see comments)  (x 1 on mat)   Transfers   Transfer yes   Sit to Stand   Sit <> Stand Assistance Supervision   Sit <> Stand Assistive Device No Assistive Device;Rolling Walker   Trials/Comments 2 trials to steps and 2 trials to RW   Stand to Sit   Assistance Supervision   Assistive Device No Assistive Device   Trials/Comments 2 trials from steps and 2 trials from RW   Chair to Mat   Chair<> Mat Technique Stand Pivot   Chair<>Mat Assistance Supervision   Chair <> Mat Assistive Device No Assistive Device   Trials/Comments 1 trial   Mat to Chair   Technique Stand Pivot   Assistance Supervision   Assistive Device No Assistive Device   Trials/Comments 1 trial   Wheelchair Activities   Propulsion Yes   Propulsion Type 1 Manual   Level 1 Level tile   Method 1 Right upper extremity;Left upper extremity   Level of Assistance 1 Modified Independent   Description/ Details 1 200 feet   Gait   Gait Yes   Weight Bearing Status full   Gait 1   Surface 1 Level tile   Gait Assistive Device Rolling walker   Assistance 1 Stand by Assistance   Gait Distance 285 feet x 2 trials~ seated " rest break between trials   Gait Pattern reciprocal   Gait Deviation(s) decreased duran;increased time in double stance;decreased velocity of limb motion;decreased step length;decreased stride length;decreased toe-to-floor clearance;decreased weight-shifting ability;other (see comments)  (mild R knee instability in stance)   Impairments Contributing to Gait Deviations impaired balance;impaired motor control;impaired postural control;decreased strength;other (see comments)  (decreased cognition/attention)   Stairs   Stairs Yes   Stairs/Curb Step   Rails 1 Bilateral   Device 1 No device   Assistance 1 Stand by Assistance   Comment/# Steps 1 pt ascends and descends 12 steps with step-to pattern( pt ascends with R LE and descends with L LE)   Balance   Balance No   Supine   Supine-Exercises Lower extremity   Supine-Exercise Type Other (Comment);Bridging without bolster  (unilateral hip flex, abduction, ext x 5 reps each LE)   Supine-Exercise Comments pt performs 10 reps of bridging   Equipment Use   Comments pt performs leg press activity with R LE x 5 minutes( blue/green theratubing for resistance) to improve co-contraction to R knee musculature   Other Activities   Other Activities Other (Comment)  (pt performs R LE neuromuscular reeducation at stairs)   Comments pt places R foot on first step and practices rising up and down with R knee in mild flexion( CGA provided by PT) ~ pt also combines this movement with abducting L LE to opposite edge of stairway   Activity Tolerance   Activity Tolerance Patient tolerated treatment well   After Treatment   Patient Position After Treatment Seated in wheelchair;Other (comment)  (posey belt in place)   Patient after treatment left (pt waits in gym to perform driving eval with OT)   Assessment   Prognosis Good   Problem List Decreased strength;Decreased endurance;Impaired balance;Decreased mobility;Decreased cognition;Impaired hearing;Impaired vision   Session Assessment  progressing toward goals   Assessment Pt is doing very well with all PT sessions and is slowly building her confidence with unilateral stance on R LE.  Pt still has a few missteps with ambulation and allows her R knee to hyperextend, but this happens only rarely.  Pt is very much on target with next Tuesday discharge.   Level of Motivation/Participation good   Barriers to Discharge None   Barriers to Discharge Comments  will assist as needed at discharge   Discharge Recommendations   Equipment Needed After Discharge 3-in-1 commode;walker, rolling;shower chair   Discharge Facility/Level Of Care Needs outpatient PT   Plan   Planned Therapy Intervention Continue with current plan;Bed mobility training;Transfer training;Gait training;Balance Training;ROM;Strengthening;Neuromuscular Re-education;Motor Coordination Training;Postural Re-education;Wheelchair Management/Propulsion   Therapy Frequency 2 times/day;daily;Monday-Friday;Saturday or Sunday   Physical Therapy Follow-up   PT Follow-up? Yes   PT - Next Visit Date 05/15/17   Treatment/Billable Minutes   Gait training 12   Therapeutic Activity 22   Neuromuscular Re-education 12   Total Time 46         Kelvin Sung, PT 5/12/2017

## 2017-05-12 NOTE — PROGRESS NOTES
"Occupational Therapy   Pre-Driving Skills Assessment    Antonina Butler   MRN: [id number]     Referring Physician: MD Preston  Diagnosis: lacunar infarct in the posterior limb of L internal capsul  History: Pt is currently a licensed  but has been referred to Occupational Therapy by his MD for cognitive clinical testing to be used for driving evaluation.   Education: high school, business school  Occupation: retired  Pt is right hand dominant  @  Past Medical History:   Diagnosis Date    Arthritis     Hypertension     Lacunar stroke of left subthalamic region 04/28/2017    Stroke        SUBJECTIVE:  "I have always had a bad memory"  OBJECTIVE:     Physical Function Testing:      ROM:      (R) shoulder flexion        WNL                             (R) shoulder extension     WNL                             (R) elbow flexion WNL                             (R) elbow extension    WNL                             (R) wrist flexion   WNL                             (R) wrist extension       WNL                               (L) shoulder flexion WNL                             (L) shoulder extension       WNL                             (L) elbow flexion    WNL                             (L) elbow extension  WNL                             (L) wrist flexion  WNL                             (L) wrist extension  WNL          Strength: (R) shoulder flexion           4/5                                     (R)  intact                              (R) shoulder extension    4/5                             (R) elbow flexion   4/5                             (R) elbow extension     4/5                             (R) wrist flexion  4/5                             (R) wrist extension 4/5                               (L) shoulder flexion                                          (L)                              (L) shoulder extension                               (L) elbow flexion                             (L) " elbow extension                             (L) wrist flexion                             (L) wrist extension       Balance:  Static and Dynamic sitting- Normal, Static Standing -  Fair + as Pt ambulates with RW       Transfers and Mobility:SBA    Trunk & Head Rotation: WFL     Perceptual testing:    Letter cancellation:  34/34 passing score   Line bisection:  WNLs   MVPT which assesses figure ground, visual closure and visual memory, 36/36    Right/Left discrimination: 3/4   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    Vision testing:     Glasses worn during testing    Cognitive testing:   Short term memory:  3/4,    Immediate # recall :8, passing score    Cognitive sequencing of months of year in reverse:  12/12   Long term memory:  intact  Judgment questions regarding rules of the road: 6/6    Insight:  Impaired insight into deficits and safety recommendations    Communication:   Expressive aphasia:  Not found  Receptive aphasia:  Not found      [facility] Mental Status Exam ( SLUMS) which is used to  detect mild cognitive impairment and dementia: Pt scored 25/30 which  scores indicating mild neuro-cognitive disorder    Assessment: Pt demo mild cognitive deficits (impaired short term memory recall, processing, and insight) as well as R LE weakness after recent stroke. At this time OT recommending that pt not return to driving as this could create a potentially hazardous driving situation. Pt has great potential to return to driving with continued therapies (OT,PT,ST) and would benefit from on road driving evaluation as MD finds appropriate.     PLAN:   Pt to follow up with MD to complete on-road driving evaluation as MD finds appropriate.     BRI Gomez  Occupational Therapist  5/12/2017 05/12/17 1400   OT Time Calculation   OT Start Time 1400   OT Stop Time 1445   OT Total Time (min) 45 min   General   OT Date of Treatment 05/12/17   Occupational Therapy Follow-up   OT Follow-up? Yes    Treatment/Billable Minutes   Community Work Re-entry 45   Total Time 45

## 2017-05-12 NOTE — PROGRESS NOTES
"Speech Therapy   Treatment    Antonina Butler   MRN: [id number]        05/12/17 0901   Speech Time Calculation   Speech Start Time 0901   Speech Stop Time 0950   Speech Total (min) 49 min   General Information   SLP Treatment Date 05/12/17   General Observations Pt seen individually in ST office.   General Precautions hearing impaired;fall;vision impaired   Visual/Auditory Vision impaired  (glasses)   Subjective   Patient states Pt stated "My  is more than willing to arrange things in the house for how I need them with my walker. "   Pain/Comfort   Pain Rating no pain       SLP Cognitive Treatment   Treatment Detail (SLP Cognitive Treatment) Pt engaged in reading task of CVA information of handouts. Education provided regarding home modification of living space, restroom, and kitchen areas. Also, education provided regarding driving evaluations, post stroke depression, sexuality following CVA, and fear of a second stroke. Pt engaged in structured conversation of reasoning with each topic with appropriate conclusions and awareness skills. Pt verbalized plan for d/c home with spouse and continued therapy participation appropriately. Pt remains with inconsistent safety within functional tasks, but has shown improvement within relating situations to herself and current situation. Pt requires supervision with executive functioning task of medication and financial information at this time. Pt remains oriented x4 indly.    Assessment   SLP Diagnosis mild cognitive deficits   Prognosis Good   Problem List decreased attention skills; mild deficits of safety   Session Assessment progressing toward goals   Level of Motivation/Participation good   Discharge Recommendations   Discharge Facility/Level Of Care Needs outpatient speech therapy   Plan   Plan Continue with current plan   Therapy Frequency Monday-Friday   SLP Follow-up   SLP Follow-up? Yes   Treatment/Billable Minutes   Speech Therapy Individual 49   Total Time " 49     FIM Scores  Comprehension:7  Expression: 7  Social Interaction:6  Problem Solvin  Memory: 5      Comprehension: Complex= humor, finances, rationale for medical treatment(hip precautions, pressure relief)  Basic= pain, hunger, thirst, bathroom needs, cold, nutrition, sleep      Understands complex/abstract conversation/directions. (7)      Expression:  Expresses Complex / abstract ideas. (7)      Social Interaction:  Interacts appropriately with others with medication or extra time(anti-anxiety, antidepressant) (6)      Problem Solving:  Solves complex problems with extra time (6)      Memory:  Recognizes, recalls, or executes 3 steps of 3 step request 90% of time (cueing, reminders<10%, loses track of time) (5)    Stefany Fagan CCC-SLP  2017

## 2017-05-12 NOTE — PROGRESS NOTES
Physical Therapy  FIM scores    Antonina Butler   MRN: [id number]                05/12/17 1352   Transfers   Bed/Chair/WC 5   Locomotion   Distance Walked 3   Distance Wheelchair 3   Walk 5   Wheelchair 6   Mode C   Stairs 5           Kelvin Sung, PT 5/12/2017

## 2017-05-12 NOTE — PROGRESS NOTES
Admit Date: 5/2/2017   LOS: 10 days     HPI, PFSH reviewed -- No change from admission.    Interval History/ROS: No acute events over night. Sleeping through night, eating well. Tolerating therapy, denied pain or discomfort.    - No shortness of breath, chest pain, nausea, or vomiting      Functional: mod I for bed mobility, Supervision/SBA for sit to stand (improved), Supervision for transfers (improved), WC prop Mod I    - Gait 223ft with RW, SBA , mild R knee instability   - Mod I w eating, grooming, UBD, SBA for LBD, CGA toileting (improved)  Cog: Mod I for memory, prob solving, SI; Ind with comp/Exp       Physical Exam:  Vital Signs Range (Last 24H):  Temp:  [97.6 °F (36.4 °C)-98 °F (36.7 °C)]   Pulse:  [54-57]   Resp:  [18-20]   BP: (141-159)/(73-83)   SpO2:  [96 %-99 %]   General Appearance:  In no acute distress and comfortable, sitting up in WC   - Vital Signs reviewed  Neuro/Cognition: Alert, Awake, Oriented to person, place, situation    - Followed all commands   Psych/Affect: Appropriate   Cardiac: Normal rate   Pulmonary: No labored breathing, on RA   Abdomen: soft, non-tender, and not distended     : Voiding spontaneously, No momin   Extremities: No calf tenderness, no local swelling   Skin: Intact, Warm  Musculoskeletal: LUE/LLE WFL  RUE: SA 5/5, EF, EE,  5/5  RLE HF 3/5, KE 3/5, DF/PF 5/5    Scheduled Medications:   amlodipine  5 mg Oral Daily    aspirin  81 mg Oral Daily    atorvastatin  40 mg Oral Daily    docusate sodium  100 mg Oral BID    enoxaparin  40 mg Subcutaneous Daily       PRN Medications:   acetaminophen, bisacodyl, ondansetron    Review of patient's allergies indicates:  No Known Allergies    Recent Laboratory Results:  Lab Results   Component Value Date    CREATININE 0.9 05/03/2017    BUN 17 05/03/2017     05/03/2017    K 4.0 05/03/2017     05/03/2017    CO2 31 (H) 05/03/2017     Lab Results   Component Value Date    WBC 4.74 05/03/2017    HGB 13.1 05/03/2017     HCT 40.7 05/03/2017    MCV 94 05/03/2017     05/03/2017     No results found for: PTT      Evaluation:   Antonina Butler is a 71 y.o. female admitted to inpatient rehabilitation on 5/2/2017 for stroke with impaired mobility and ADLs. Patient remains appropriate for PT, OT, and as required Speech therapy. Patient continues to require 24 hour nursing care as well as daily Physician assessment.     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 Cont to monitor closely, and w current mgmt, monitoring trend.      Pain: Will monitor closely and adjust as necessary   - Tylenol 650mg Q6 hrs PRN     Monitoring:    Nutrition/Swallow Status: 5/11 upgraded to Reg/thin    - Prealbumin -   22  - Nutritionist on board   Bladder Management: continent  Bowel Management: continent  - Colace BID and Suppository PRN   - Will monitor for regularity   Mood: stable   Sleep: monitor; Will consider sleep aid as clinically indicated   Skin: Monitor   DVT ppx: Lovenox 40mg daily     Discharge Planning:  Tentative Discharge Date: 5/16  Tentative DC location: Home with Family     No future appointments.      Arlene Ramirez MD  [facility]

## 2017-05-12 NOTE — PROGRESS NOTES
Occupational Therapy   Volleyball Group    Antonina Butler   MRN: [id number]     Patient participated in 45-60 minute volleyball group activity.  The group activity challenged dynamic standing/sitting skills, bilateral upper extremity strengthening, cardiovascular and respiratory capacity, hand -eye coordination, visual scanning and social affect/mood.  The patient performed this activity at w/c level with supervision assist  Patient performed activity using bilateral upper extremities to volley the ball, lateral arm movement noted throughout the activity.  Endurance good, no pain complaints voiced are observed , social affect good as patient was observed throughout this activity ie., appropriately engaged in social exchange with peers and staff.       05/12/17 1115   OT Time Calculation   OT Start Time 1115   OT Stop Time 1200   OT Total Time (min) 45 min   General   OT Date of Treatment 05/12/17   Plan   Plan Continue with current plan   Occupational Therapy Follow-up   OT Follow-up? Yes   Treatment/Billable Minutes   Therapeutic Group 45   Total Time 45       BRI Gomez   5/12/2017

## 2017-05-12 NOTE — PROGRESS NOTES
Pt discussed during treatment team staffing.  Expected discharge date is 5/16/17.  Pt informed and agrees with discharge date.  Discharge plan is home with spouse.  Pt has Medicare with benefits for DME and home health.  SW following to assist with discharge needs.

## 2017-05-13 PROCEDURE — 63600175 PHARM REV CODE 636 W HCPCS: Performed by: HOSPITALIST

## 2017-05-13 PROCEDURE — 99232 SBSQ HOSP IP/OBS MODERATE 35: CPT | Mod: ,,, | Performed by: PHYSICAL MEDICINE & REHABILITATION

## 2017-05-13 PROCEDURE — 25000003 PHARM REV CODE 250: Performed by: HOSPITALIST

## 2017-05-13 PROCEDURE — 12800000 HC REHAB SEMI-PRIVATE ROOM

## 2017-05-13 PROCEDURE — 25000003 PHARM REV CODE 250: Performed by: PHYSICAL MEDICINE & REHABILITATION

## 2017-05-13 RX ADMIN — ATORVASTATIN CALCIUM 40 MG: 20 TABLET, FILM COATED ORAL at 07:05

## 2017-05-13 RX ADMIN — DOCUSATE SODIUM 100 MG: 100 CAPSULE, LIQUID FILLED ORAL at 07:05

## 2017-05-13 RX ADMIN — AMLODIPINE BESYLATE 5 MG: 5 TABLET ORAL at 07:05

## 2017-05-13 RX ADMIN — ENOXAPARIN SODIUM 40 MG: 100 INJECTION SUBCUTANEOUS at 05:05

## 2017-05-13 RX ADMIN — ASPIRIN 81 MG: 81 TABLET, COATED ORAL at 07:05

## 2017-05-13 NOTE — NURSING
Pt ambulated and walked in hallway with walker and nursing at the side. Stand by assistance given.

## 2017-05-13 NOTE — PROGRESS NOTES
Admit Date: 5/2/2017   LOS: 11 days     HPI, PFSH reviewed -- No change from admission.    Interval History/ROS: No acute events over night. Sleeping through night, eating well. Tolerating therapy, denied pain or discomfort.    - No shortness of breath, chest pain, nausea, or vomiting      Functional: mod I for bed mobility, Supervision/SBA for sit to stand (improved), Supervision for transfers (improved), WC prop Mod I    - Gait 223ft with RW, SBA , mild R knee instability   - Mod I w eating, grooming, UBD, SBA for LBD, CGA toileting (improved)  Cog: Mod I for memory, prob solving, SI; Ind with comp/Exp       Physical Exam:  Vital Signs Range (Last 24H):  Temp:  [97.6 °F (36.4 °C)-98.3 °F (36.8 °C)]   Pulse:  [57-62]   Resp:  [17-18]   BP: (131-132)/(75-86)   SpO2:  [91 %-96 %]   General Appearance:  In no acute distress and comfortable, sitting up in WC   - Vital Signs reviewed  Neuro/Cognition: Alert, Awake, Oriented to person, place, situation    - Followed all commands   Psych/Affect: Appropriate   Cardiac: Normal rate   Pulmonary: No labored breathing, on RA   Abdomen: soft, non-tender, and not distended     : Voiding spontaneously, No momin   Extremities: No calf tenderness, no local swelling   Skin: Intact, Warm  Musculoskeletal: LUE/LLE WFL  RUE: SA 5/5, EF, EE,  5/5  RLE HF 3/5, KE 3/5, DF/PF 5/5    Scheduled Medications:   amlodipine  5 mg Oral Daily    aspirin  81 mg Oral Daily    atorvastatin  40 mg Oral Daily    docusate sodium  100 mg Oral BID    enoxaparin  40 mg Subcutaneous Daily       PRN Medications:   acetaminophen, bisacodyl, ondansetron    Review of patient's allergies indicates:  No Known Allergies    Recent Laboratory Results:  Lab Results   Component Value Date    CREATININE 0.9 05/03/2017    BUN 17 05/03/2017     05/03/2017    K 4.0 05/03/2017     05/03/2017    CO2 31 (H) 05/03/2017     Lab Results   Component Value Date    WBC 4.74 05/03/2017    HGB 13.1 05/03/2017     HCT 40.7 05/03/2017    MCV 94 05/03/2017     05/03/2017     No results found for: PTT      Evaluation:   Antonina Butler is a 71 y.o. female admitted to inpatient rehabilitation on 5/2/2017 for stroke with impaired mobility and ADLs. Patient remains appropriate for PT, OT, and as required Speech therapy. Patient continues to require 24 hour nursing care as well as daily Physician assessment.     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      Pain: Will monitor closely and adjust as necessary   - Tylenol 650mg Q6 hrs PRN     Monitoring:    Nutrition/Swallow Status: 5/11 upgraded to Reg/thin    - Prealbumin -   22  - Nutritionist on board   Bladder Management: continent  Bowel Management: continent  - Colace BID and Suppository PRN   - Will monitor for regularity   Mood: stable   Sleep: monitor; Will consider sleep aid as clinically indicated   Skin: Monitor   DVT ppx: Lovenox 40mg daily     Discharge Planning:  Tentative Discharge Date: 5/16  Tentative DC location: Home with Family     No future appointments.      Arlene Ramirez MD  [facility]

## 2017-05-14 PROCEDURE — 12800000 HC REHAB SEMI-PRIVATE ROOM

## 2017-05-14 PROCEDURE — 25000003 PHARM REV CODE 250: Performed by: HOSPITALIST

## 2017-05-14 PROCEDURE — 99233 SBSQ HOSP IP/OBS HIGH 50: CPT | Mod: ,,, | Performed by: PHYSICAL MEDICINE & REHABILITATION

## 2017-05-14 PROCEDURE — 25000003 PHARM REV CODE 250: Performed by: PHYSICAL MEDICINE & REHABILITATION

## 2017-05-14 PROCEDURE — 63600175 PHARM REV CODE 636 W HCPCS: Performed by: HOSPITALIST

## 2017-05-14 RX ORDER — AMLODIPINE BESYLATE 5 MG/1
5 TABLET ORAL DAILY
Status: DISCONTINUED | OUTPATIENT
Start: 2017-05-15 | End: 2017-05-16 | Stop reason: HOSPADM

## 2017-05-14 RX ADMIN — ATORVASTATIN CALCIUM 40 MG: 20 TABLET, FILM COATED ORAL at 08:05

## 2017-05-14 RX ADMIN — AMLODIPINE BESYLATE 5 MG: 5 TABLET ORAL at 08:05

## 2017-05-14 RX ADMIN — ENOXAPARIN SODIUM 40 MG: 100 INJECTION SUBCUTANEOUS at 05:05

## 2017-05-14 RX ADMIN — ASPIRIN 81 MG: 81 TABLET, COATED ORAL at 08:05

## 2017-05-14 RX ADMIN — DOCUSATE SODIUM 100 MG: 100 CAPSULE, LIQUID FILLED ORAL at 08:05

## 2017-05-14 NOTE — PROGRESS NOTES
Admit Date: 5/2/2017   LOS: 12 days     HPI, PFSH reviewed -- No change from admission.    Interval History/ROS: No acute events over night. Sleeping through night, eating well. Tolerating therapy, denied pain or discomfort.    - No shortness of breath, chest pain, nausea, or vomiting      Functional: mod I for bed mobility, Supervision/SBA for sit to stand (improved), Supervision for transfers (improved), WC prop Mod I    - Gait 223ft with RW, SBA , mild R knee instability   - Mod I w eating, grooming, UBD, SBA for LBD, CGA toileting (improved)  Cog: Mod I for memory, prob solving, SI; Ind with comp/Exp       Physical Exam:  Vital Signs Range (Last 24H):  Temp:  [97.5 °F (36.4 °C)-98.5 °F (36.9 °C)]   Pulse:  [53-64]   Resp:  [18]   BP: (127-161)/(63-89)   SpO2:  [95 %-97 %]   General Appearance:  In no acute distress and comfortable, sitting up in WC   - Vital Signs reviewed  Neuro/Cognition: Alert, Awake, Oriented to person, place, situation    - Followed all commands   Psych/Affect: Appropriate   Cardiac: Normal rate   Pulmonary: No labored breathing, on RA   Abdomen: soft, non-tender, and not distended     : Voiding spontaneously, No momin   Extremities: No calf tenderness, no local swelling   Skin: Intact, Warm  Musculoskeletal: LUE/LLE WFL  RUE: SA 5/5, EF, EE,  5/5  RLE HF 3/5, KE 3/5, DF/PF 5/5    Scheduled Medications:   [START ON 5/15/2017] amlodipine  7.5 mg Oral Daily    aspirin  81 mg Oral Daily    atorvastatin  40 mg Oral Daily    docusate sodium  100 mg Oral BID    enoxaparin  40 mg Subcutaneous Daily       PRN Medications:   acetaminophen, bisacodyl, ondansetron    Review of patient's allergies indicates:  No Known Allergies    Recent Laboratory Results:  Lab Results   Component Value Date    CREATININE 0.9 05/03/2017    BUN 17 05/03/2017     05/03/2017    K 4.0 05/03/2017     05/03/2017    CO2 31 (H) 05/03/2017     Lab Results   Component Value Date    WBC 4.74 05/03/2017     HGB 13.1 05/03/2017    HCT 40.7 05/03/2017    MCV 94 05/03/2017     05/03/2017     No results found for: PTT      Evaluation:   Antonina Butler is a 71 y.o. female admitted to inpatient rehabilitation on 5/2/2017 for stroke with impaired mobility and ADLs. Patient remains appropriate for PT, OT, and as required Speech therapy. Patient continues to require 24 hour nursing care as well as daily Physician assessment.     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 Will cont to monitor closely     Pain: Will monitor closely and adjust as necessary   - Tylenol 650mg Q6 hrs PRN     Monitoring:    Nutrition/Swallow Status: 5/11 upgraded to Reg/thin    - Prealbumin -   22  - Nutritionist on board   Bladder Management: continent  Bowel Management: continent  - Colace BID and Suppository PRN   - Will monitor for regularity   Mood: stable   Sleep: monitor; Will consider sleep aid as clinically indicated   Skin: Monitor   DVT ppx: Lovenox 40mg daily     Discharge Planning:  Tentative Discharge Date: 5/16  Tentative DC location: Home with Family     No future appointments.      Arlene Ramirez MD  [facility]

## 2017-05-15 PROCEDURE — 99232 SBSQ HOSP IP/OBS MODERATE 35: CPT | Mod: ,,, | Performed by: PHYSICAL MEDICINE & REHABILITATION

## 2017-05-15 PROCEDURE — 97116 GAIT TRAINING THERAPY: CPT

## 2017-05-15 PROCEDURE — 63600175 PHARM REV CODE 636 W HCPCS: Performed by: HOSPITALIST

## 2017-05-15 PROCEDURE — 92507 TX SP LANG VOICE COMM INDIV: CPT

## 2017-05-15 PROCEDURE — 97535 SELF CARE MNGMENT TRAINING: CPT

## 2017-05-15 PROCEDURE — 25000003 PHARM REV CODE 250: Performed by: HOSPITALIST

## 2017-05-15 PROCEDURE — 12800000 HC REHAB SEMI-PRIVATE ROOM

## 2017-05-15 PROCEDURE — 97150 GROUP THERAPEUTIC PROCEDURES: CPT

## 2017-05-15 PROCEDURE — 25000003 PHARM REV CODE 250: Performed by: PHYSICAL MEDICINE & REHABILITATION

## 2017-05-15 PROCEDURE — 97530 THERAPEUTIC ACTIVITIES: CPT

## 2017-05-15 RX ORDER — AMLODIPINE BESYLATE 2.5 MG/1
5 TABLET ORAL DAILY
Qty: 60 TABLET | Refills: 3 | Status: SHIPPED | OUTPATIENT
Start: 2017-05-15 | End: 2017-05-16

## 2017-05-15 RX ADMIN — ASPIRIN 81 MG: 81 TABLET, COATED ORAL at 08:05

## 2017-05-15 RX ADMIN — AMLODIPINE BESYLATE 5 MG: 5 TABLET ORAL at 08:05

## 2017-05-15 RX ADMIN — ATORVASTATIN CALCIUM 40 MG: 20 TABLET, FILM COATED ORAL at 08:05

## 2017-05-15 RX ADMIN — DOCUSATE SODIUM 100 MG: 100 CAPSULE, LIQUID FILLED ORAL at 08:05

## 2017-05-15 RX ADMIN — ENOXAPARIN SODIUM 40 MG: 100 INJECTION SUBCUTANEOUS at 05:05

## 2017-05-15 NOTE — TREATMENT PLAN
"Rehab Services' DME recommendations  DME to be delivered home unless otherwise specified.         [] NO DME NEEDED ________________________________________________________________________    [x]Walker:(can only select one of these)  []Adult (5'4"-6'6") [x]Jose (4'4"-5'7")                           [] Wide/ Heavy Duty         []Cameron                  []  Rollator                                                 [] knee walker       Accessories/Other:____________________________________     Wheels:(must complete) [x] Yes  [] No     []Crutches:  []Axillary []Loft strand    []Cane:              []Straight []Narrow based quad   []Wide based quad         [] Other:____________________________________________    [] Transfer Devices:   []Humphrey Lift    []Slide Board ( [] with cut out  []26  []29)    ______________________________________________________________________    []Wheelchair: (please check)    Number of hours up in wheelchair per     day:_____________     Style:  [] Standard [] Pediatric  [] Light weight  []Reclining     []Amputee [] Cameron [] POV []Custom     Custom Vendor:________________________________________                                                      Seat Width: []18 (standard adult)    []16" (small adult)   []14(child)      [] 20  [] 22 [] 24         Seat Depth:   []16    []18  []20      Back Height:    []Standard      []Cameron          []Other     Leg Support: []Standard []Heel loops []Elevating leg rest    []Articulating              []Swing Away     Arm Height:  []Detachable []Full   [] Desk  []Swing Away [] Adjustable Height          Lap Belt: []Velcro []Buckle                               Accessories: [] Front brakes          [] Brake Extensions  []Anti-tippers                          []Safety Belt    Other:_______________________________________________     Cushion: []Basic []Foam []Gel  []Roho []Storm I      Justification for Cushion(Must " complete):_______________________________    ______________________________________________________________________        For wheelchair order: (please choose all that apply)  - Caregiver is capable and willing to operate wheelchair safely. []  - Patients upper body strength is sufficient for propulsion. []   - The patient has a cast, brace, or musculoskeletal condition which prevents 90 degree flexion of the knee. []  - The patient has significant edema of the lower extremities that requires elevating leg rests.  []  - A reclining back is ordered. []  - The patient requires the use of a wheel chair for ADLs within the home. []  - Patient mobility limitations cannot be sufficiently resolved by the use of other ambulatory therapies. []         __    []3 in 1 commode: []Standard     []Wide-Heavy Duty           []Drop arm                                      []Heavy Duty Drop Arm                              []Tub bench:  []Padded      [](Unpadded=standard)     []Commode Opening                                          [] Heavy Duty    [x]Shower Chair: [x]With back   []Without back      []Hospital Bed: []Semi Electric    []Manual    []Electric   []Heavy Duty  []Extra Heavy Duty(>600#)  Patient requires a bed height different than a fixed height hospital bed to permit transfers to chair, wheel chair or standing. []Yes         []N/A     []Accessories: [] Gel Overlay Mattress     []Low Air Mattress   []Alternating Pressure Pad                              []Trapeze    [] Hip Kit:  [] Short Horn     [] Long Horn         []Other:________________________________________________________________    ________________________________________________________________________    []Home Health:  []OT []PT []SLP   [] MSW   [] Aide    []SN        [x]Outpatient:  [x]OT [x]PT [x]SLP [] MSW   [] CM      [] Internal Referral      [] External Referral  ________________________________________________________________________    Kelvin C  Pineda, PT 5/15/2017

## 2017-05-15 NOTE — PROGRESS NOTES
Occupational Therapy   FIM Scores    Antonina Butler   MRN: [id number]      05/15/17 0830   Transfers   Toilet 5   Tub 5   Shower 5   Self Care   Eating 6   Grooming 6   Bathing 5   Dressing-Upper 6   Dressing-Lower 5   Toileting 5   Communication   Comprehension 6   Mode B   Expression 6   Mode B   Social Cognition   Social Interaction 7   Problem Solving 7   Memory 7   I certify that I was present in the room directing the student in service delivery and guiding them using my skilled judgment. As the co-signing therapist, I have reviewed the student's documentation and am responsible for the treatment, assessment and plan.    MERY Beasley  5/15/2017

## 2017-05-15 NOTE — PROGRESS NOTES
"Speech Therapy  Discharge Summary/ Treatment    Antonina Butler   MRN: [id number]     Short Term Goals   Goal 1 Pt will complete problem solving/reasoning complex tasks with modified independence with 95% acc. MET   Goal 2 Pt will complete recall task with modified independence with 95% acc. MET   Goal 3 Pt will complete executive functioning financial/medication management tasks independently with 100% acc. NOT MET   Long Term Goals   Goal 1 Pt will complete problem solving/reasoning complex tasks with independence with 100% acc.NOT MET   Goal 2 Pt will complete recall task with independence with 100% acc. NOT MET   Goal 3 Pt will participate in CVA education. COMPLETE        05/15/17 1031   Speech Time Calculation   Speech Start Time 1031   Speech Stop Time 1120   Speech Total (min) 49 min   General Information   SLP Treatment Date 05/15/17   General Observations Pt seen individually sitting upright in w/c and taken outside.    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 Pt also completed reasoning and safety awareness task of upcoming d/c home indly with 100% acc. Pt shows significant increase in cognitive skills approaching baseline status. SLUMS Examination administered with 29/30 score indicating "Normal cognitive" function. Results discussed with pt. Recall strategies reviewed (calendar/check list/reminders) pt agreeable to utilize within home environment.    Assessment   SLP Diagnosis mild cognitive deficits   Prognosis Good   Problem List poor recall; decreased self awareness/safety   Session Assessment progressing toward goals   Level of Motivation/Participation good   Discharge Recommendations "   Discharge Facility/Level Of Care Needs outpatient speech therapy   Plan   Plan Alter current plan  (pt planned for d/c home with spouse)   Treatment/Billable Minutes   Speech Therapy Individual 49   Total Time 49     Recommend continued skilled St services via outpatient setting targeting return to independent living and driving with reaction times.    FIM Scores  Comprehension:7  Expression: 7  Social Interaction:6  Problem Solvin  Memory: 6    Comprehension:  Complex= humor, finances, rationale for medical treatment(hip precautions, pressure relief)  Basic= pain, hunger, thirst, bathroom needs, cold, nutrition, sleep    Understands complex/abstract conversation/directions. (7)    Expression:  Expresses Complex / abstract ideas. (7)    Social Interaction:  Interacts appropriately with others with medication or extra time(anti-anxiety, antidepressant)  (6)    Problem Solving:  Solves complex problems with extra time (6)    Memory:   Recognizes, recalls, or executes 3 steps of 3 step request with extra time/devic (memory book, calendar to remember)  (6)    Stefany Fagan CCC-SLP  5/15/2017

## 2017-05-15 NOTE — PROGRESS NOTES
Physical Therapy   FIM scores( discharge PT)    Antonina Butler   MRN: [id number]                  05/15/17 1347   Transfers   Bed/Chair/WC 5   Toilet 5   Tub 5   Locomotion   Distance Walked 3   Distance Wheelchair 3   Walk 5   Wheelchair 6   Mode W   Stairs 5           Kelvin Sung, PT 5/15/2017

## 2017-05-15 NOTE — PROGRESS NOTES
Physical Therapy   Treatment/Transfer Group    Antonina Butler   MRN: [id number]                05/15/17 1545   PT Time Calculation   PT Start Time 1545   PT Stop Time 1630   PT Total Time (min) 45 min   Treatment   Treatment Type Treatment;Other (see comments)  (Transfer Group)   PT/PTA PT   General   PT Received On 05/15/17   Family/Caregiver Present No   Patient Found (position) Seated in wheelchair;Other (comment)  (in bathroom)   Pt found with (posey belt)   Precautions   General Precautions fall;hearing impaired;vision impaired   Visual/Auditory Hearing impaired;Vision impaired   Subjective   Patient states no complaints.   Pain/Comfort   Pain Rating no pain   Pain Rating Post-Intervention no pain   Activity Tolerance   Activity Tolerance Patient tolerated treatment well   After Treatment   Patient Position After Treatment Seated in wheelchair;Other (comment)  (posey belt in place)   Patient after treatment left (pt propels back to her room)   Assessment   Prognosis Good   Physical Therapy Follow-up   PT Follow-up? No   Treatment/Billable Minutes   Therapeutic Activity Group 45   Total Time 45             Patient participated in 45 minute functional transfers group. The group activity challenged bilateral upper extremity and lower extremity strengthening. In addition, cardiovascular and functional endurance were challenged during this group. Pt performs limited UE and LE ex from w/c, as well as beach ball tapping.  Stand pivot transfers w/c<> mat with supervision. Patient completed bed mobility from supine to sitting edge of mat with independence. Patient completed toilet transfer x 2 trials with supervision.  Pt completed tub transfers with SBA( pt steps into and out of tub with grab bars and SBA). Patient completed w/c <> car transfer with __supervision__ assistance. Patient completed (10) sit to stand transfer(s) from the  w/c with __supervision__ assistance . Patient demonstrated appropriate safety  awareness with functional transfers. Educated patient on compensatory and adaptive techniques for functional home transfers. Patient demonstrated increased independence with all transfers (or list specific transfers). These activities were performed in a group setting to encourage increased participation with peers and social interaction skills.          Kelvin Sung, PT 5/15/2017

## 2017-05-15 NOTE — TREATMENT PLAN
"Rehab Services' DME recommendations  DME to be delivered home unless otherwise specified.       [] NO DME NEEDED ________________________________________________________________________    [x]Walker:(can only select one of these) []Adult (5'4"-6'6") [x]Jose (4'4"-5'7")                  [] Wide/ Heavy Duty     []Cameron       []  Rollator      [] knee walker  Accessories/Other:_____  Wheels:(must complete) [x] Yes  [] No     []Crutches:  []Axillary []Loft strand    []Cane:              []Straight []Narrow based quad   []Wide based quad         [] Other:____________________________________________    [] Transfer Devices:   []Humphrey Lift    []Slide Board ( [] with cut out  []26  []29)    ______________________________________________________________________    []Wheelchair: (please check)   Number of hours up in wheelchair per day:___     Style:  [] Standard [] Pediatric  [] Light weight  []Reclining     []Amputee [] Cameron [] POV []Custom     Custom Vendor:________________________________________                                                      Seat Width: []18 (standard adult)    []16" (small adult)   []14(child)      [] 20  [] 22 [] 24         Seat Depth:   []16    []18  []20      Back Height:    []Standard      []Cameron          []Other     Leg Support: []Standard []Heel loops []Elevating leg rest    []Articulating              []Swing Away     Arm Height:  []Detachable []Full   [] Desk  []Swing Away [] Adjustable Height        Lap Belt: []Velcro []Buckle                               Accessories: [] Front brakes          [] Brake Extensions  []Anti-tippers                          []Safety Belt    Other:_______________________________________________     Cushion: []Basic []Foam []Gel  []Roho []Storm I       Justification for Cushion(Must complete):______________________    For wheelchair order: (please choose all that apply)  - Caregiver is capable and willing to operate wheelchair safely. []  - Patients " upper body strength is sufficient for propulsion. []   - The patient has a cast, brace, or musculoskeletal condition which prevents 90 degree flexion of the knee. []  - The patient has significant edema of the lower extremities that requires elevating leg rests.  []  - A reclining back is ordered. []  - The patient requires the use of a wheel chair for ADLs within the home. []  - Patient mobility limitations cannot be sufficiently resolved by the use of other ambulatory therapies.     []3 in 1 commode: []Standard     []Wide-Heavy Duty           []Drop arm                                      []Heavy Duty Drop Arm                              []Tub bench:  []Padded      [](Unpadded=standard)     []Commode Opening                                          [] Heavy Duty    [x]Shower Chair: [x]With back   []Without back      []Hospital Bed: []Semi Electric    []Manual    []Electric   []Heavy Duty  []Extra Heavy Duty(>600#)  Patient requires a bed height different than a fixed height hospital bed to permit transfers to chair, wheel chair or standing. []Yes         []N/A     []Accessories: [] Gel Overlay Mattress     []Low Air Mattress   []Alternating Pressure Pad                              []Trapeze    [] Hip Kit:  [] Short Horn     [] Long Horn      []Other:________________________________________________________________    []Home Health:  []OT []PT []SLP   [] MSW   [] Aide    []SN        [x]Outpatient:  [x]OT [x]PT [x]SLP [] MSW   [] CM      [x] Internal Referral      [] External Referral  ________________________________________________________________________    Harini Espitia 5/15/2017

## 2017-05-15 NOTE — PROGRESS NOTES
Admit Date: 5/2/2017   LOS: 13 days     HPI, PFSH reviewed -- No change from admission.    Interval History/ROS: No acute events over night.   Tolerating therapy, denied pain or discomfort.  Reviewed BP / HR monitoring/mgmt at home.   - No shortness of breath, chest pain, nausea, or vomiting      Functional: mod I for bed mobility, Supervision/SBA for sit to stand (improved), Supervision for transfers (improved), WC prop Mod I    - Gait 223ft with RW, SBA , mild R knee instability   - Mod I w eating, grooming, UBD, SBA for LBD, CGA toileting (improved)  Cog: Mod I for memory, prob solving, SI; Ind with comp/Exp     Physical Exam:  Vital Signs Range (Last 24H):  Temp:  [98 °F (36.7 °C)-98.2 °F (36.8 °C)]   Pulse:  [54-72]   Resp:  [17-18]   BP: (128-130)/(71-78)   SpO2:  [94 %-96 %]   General Appearance:  In no acute distress and comfortable, sitting up in WC   - Vital Signs reviewed  Neuro/Cognition: Alert, Awake, Oriented to person, place, situation    - Followed all commands   Psych/Affect: Appropriate   Cardiac: Normal rate   Pulmonary: No labored breathing, on RA   Abdomen: soft, non-tender, and not distended     : Voiding spontaneously, No momin   Extremities: No calf tenderness, no local swelling   Skin: Intact, Warm  Musculoskeletal: LUE/LLE WFL  RUE: SA 5/5, EF, EE,  5/5  RLE HF 3/5, KE 4/5, DF/PF 5/5    Scheduled Medications:   amlodipine  5 mg Oral Daily    aspirin  81 mg Oral Daily    atorvastatin  40 mg Oral Daily    docusate sodium  100 mg Oral BID    enoxaparin  40 mg Subcutaneous Daily       PRN Medications:   acetaminophen, bisacodyl, ondansetron    Review of patient's allergies indicates:  No Known Allergies    Recent Laboratory Results:  Lab Results   Component Value Date    CREATININE 0.9 05/03/2017    BUN 17 05/03/2017     05/03/2017    K 4.0 05/03/2017     05/03/2017    CO2 31 (H) 05/03/2017     Lab Results   Component Value Date    WBC 4.74 05/03/2017    HGB 13.1  05/03/2017    HCT 40.7 05/03/2017    MCV 94 05/03/2017     05/03/2017     No results found for: PTT      Evaluation:   Antonina Butler is a 71 y.o. female admitted to inpatient rehabilitation on 5/2/2017 for stroke with impaired mobility and ADLs. Patient remains appropriate for PT, OT, and as required Speech therapy. Patient continues to require 24 hour nursing care as well as daily Physician assessment.     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, mild cog deficits, dysphagia  - cont w ASA/statin   - F/U with Neuro as OP   - cont w PT, OT, Speech  -- improving     HTN --   - cont w Amlodipine daily  - monitor BP/HR closely as there can be fluctuation with initiation of therapy   5/15 VSS,  Will cont to monitor closely     Pain: Will monitor closely and adjust as necessary   - Tylenol 650mg Q6 hrs PRN     Monitoring:    Nutrition/Swallow Status: 5/11 upgraded to Reg/thin    - Prealbumin -   22  - Nutritionist on board   Bladder Management: continent  Bowel Management: continent  - Colace BID and Suppository PRN   - Will monitor for regularity   Mood: stable   Sleep: monitor; Will consider sleep aid as clinically indicated   Skin: Monitor   DVT ppx: Lovenox 40mg daily     Discharge Planning:  Tentative Discharge Date: 5/16  Tentative DC location: Home with Family     No future appointments.      Arlene Ramirez MD  [facility] [place]

## 2017-05-15 NOTE — PROGRESS NOTES
"Physical Therapy   Treatment/Discharge Summary    Antonina Butler   MRN: [id number]                05/15/17 1300   PT Time Calculation   PT Start Time 1300   PT Stop Time 1345   PT Total Time (min) 45 min   Treatment   Treatment Type Treatment;Other (see comments)  (Discharge Visit)   PT/PTA PT   General   PT Received On 05/15/17   Family/Caregiver Present Yes  ()   Patient Found (position) Seated in wheelchair;Other (comment)  (in room)   Pt found with (posey belt)   Precautions   General Precautions fall;hearing impaired;vision impaired   Visual/Auditory Hearing impaired;Vision impaired   Subjective   Patient states " My hip is sore."  (pt referring to R hip)   Pain/Comfort   Pain Rating no pain   Pain Rating Post-Intervention no pain   Bed Mobility   Bed Mobility yes   Rolling/Turning to Left Modified independent;Other (see comments)  (x 1 on mat)   Rolling/Turning Right Modified independent;Other (see comments)  (x 1 on mat)   Supine to Sit Modified Independent;Other (see comments);Independent   Supine to Sit Comments x 1 on mat   Sit to Supine Independent;Other (see comments)  (x 1 on mat)   Transfers   Transfer yes   Sit to Stand   Sit <> Stand Assistance Supervision   Sit <> Stand Assistive Device No Assistive Device;Rolling Walker   Trials/Comments multiple trials   Stand to Sit   Assistance Supervision   Assistive Device No Assistive Device;Rolling Walker   Trials/Comments multiple trials   Chair to Mat   Chair<> Mat Technique Stand Pivot   Chair<>Mat Assistance Supervision   Chair <> Mat Assistive Device No Assistive Device   Trials/Comments 1 trial   Mat to Chair   Technique Stand Pivot   Assistance Supervision   Assistive Device No Assistive Device   Trials/Comments 1 trial   Tub Bench Transfer   Technique Stand Pivot   Assistance Stand By Assistance   Assistive Device No Assistive Device   Trials/Comments pt steps into and out of tub with use of grab bars   Car Transfer   Car Transfer Technique " Stand Pivot   Car Transfer Assistance Supervision   Car Transfer Assistive Device Rolling Walker   Trials/Comments 1 trial   Wheelchair Activities   Propulsion Yes   Propulsion Type 1 Manual   Level 1 Level tile   Method 1 Right upper extremity;Left upper extremity   Level of Assistance 1 Modified Independent   Description/ Details 1 200 feet   Gait   Gait Yes   Weight Bearing Status full   Gait 1   Surface 1 Level tile   Gait Assistive Device Rolling walker   Assistance 1 Stand by Assistance   Gait Distance 285 feet   Gait Pattern swing-through gait   Gait Deviation(s) decreased duran;decreased step length;decreased stride length;increased time in double stance;decreased velocity of limb motion;decreased weight-shifting ability;decreased toe-to-floor clearance;other (see comments)  (mild R knee instability in stance)   Impairments Contributing to Gait Deviations impaired balance;impaired motor control;impaired postural control;decreased strength   Stairs   Stairs Yes   Stairs/Curb Step   Rails 1 Bilateral   Device 1 No device   Assistance 1 Stand by Assistance   Comment/# Steps 1 pt manages 12 steps with step-to pattern( pt ascends with R LE and descends with L LE)   Stairs/ Curb Step  2   Rails 2 None (Comment)   Device 2 Rolling walker   Assistance 2 Stand by Assistance   Comment/# Steps 2 pt manages 4 inch curb with RW   Balance   Balance No   Other Activities   Other Activities Other (Comment)  (stand pivot transfers w/c<> 3 in 1 commode with supervision)   Comments pt picks up object from floor with RW and SBA; pt ascends and descends 10 foot ramp with RW, SBA   Activity Tolerance   Activity Tolerance Patient tolerated treatment well   Other Comments   Comments PT educated pt in B LE HEP and issued written handout.  PT instructed pt to study program tonight and he will answer any questions pt may have in morning.   After Treatment   Patient Position After Treatment Seated in wheelchair;Other  (comment)  (posey belt in place)   Patient after treatment left call button in reach  (pt waits in room for next therapy session)   Assessment   Prognosis Good   Problem List Decreased strength;Decreased endurance;Impaired balance;Decreased mobility;Decreased cognition;Impaired hearing;Impaired vision   Session Assessment progressing toward goals   Assessment Pt finished her individual treatments in PT with good form.  Pt remains at a SBA/supervision level for safety, but has potential to achieve mod I in the near future.  Pt remains limited by decreased memory/cognition, but overall these deficits appear to be mild.  Expect pt to do well in OPPT.   Level of Motivation/Participation good   Barriers to Discharge None   Barriers to Discharge Comments  will assist   Long Term Goals   Supine to Sit-Met/Not Met Met   Sit to Supine - Met/Not Met Met   Logroll - Met/Not Met Not Met   Transfer Sit to stand - Met/Not Met Met   Transfer Bed/Chair - Met/Not Met Met   Ambulate - Met/Not Met Met   Go Up/Down Stairs - Met/Not Met Met   Go Up/Down Curb- Met/Not Met Met   Stand - Met/Not Met Met   Tolerate Exercise - Met/Not Met Met   Propel Wheelchair - Met/Not Met Met   Other Goal Met   Other Goal 2 Met   Other Goal 3 Not Met~  did not attend any training sessions, but should be fine supervising pt as needed   Discharge Recommendations   Equipment Needed After Discharge walker, rolling;shower chair   Discharge Facility/Level Of Care Needs outpatient PT   Physical Therapy Follow-up   PT Follow-up? Yes  (transfer group)   PT - Next Visit Date 05/15/17   Treatment/Billable Minutes   Gait training 8   Therapeutic Activity 37   Total Time 45           Kelvin Sung, PT 5/15/2017

## 2017-05-15 NOTE — PROGRESS NOTES
"Occupational Therapy   Treatment/ Discharge    Antonina Butler   MRN: [id number]    05/15/17 0830   OT Time Calculation   OT Start Time 0830   OT Stop Time 0915   OT Total Time (min) 45 min   General   OT Date of Treatment 05/15/17   Family/Caregiver Present No   Patient Found (position) Seated in wheelchair   Precautions   General Precautions fall;hearing impaired   Visual/Auditory Hearing impaired;Vision impaired  (Requires glasses)   Subjective   Patient states "Wait, let me do it the way they taught me in therapy."   Pain/Comfort   Pain Comment No indication of pain in session   Transfers   Transfer yes   Sit to Stand   Sit <> Stand Assistance Stand By Assistance   Sit <> Stand Assistive Device Rolling Walker   Trials/Comments Pt demonstrated increased safety awareness with sit to stand transfer on wet surface when showering by self-correcting initial impulsive stand.   Stand to Sit   Assistance Stand By Assistance   Assistive Device Rolling Walker   Trials/Comments Pt expresses interest in installing grab bars in bathroom at home in order to promote a safe environment when performing ADLs.   Tub Bench Transfer   Tub Transfer Technique Stand Pivot   Tub Bench Transfer Assistance Supervision   Tub Transfer Assistive Device Rolling Walker   Trials/Comments Pt stepped over tub ledge using RW and walls for balance.     Shower Transfer   Shower Transfer Technique Stand Pivot   Shower Transfer Assistance Supervision   Shower Transfer Assistive Device Rolling Walker   Trials/Comments Pt used RW to transfer to shower chair and required minimal verbal cues to use RW for balance with stand pivot transfer.    Toilet Transfer   Toilet Transfer Technique Stand Pivot   Toilet Transfer Assistance Supervision   Toilet Transfer Assistive Device Rolling Walker   Trials/Comments Pt required verbal cues to utilize RW to for stability when side stepping over ledge.   Feeding   Feeding Level of Assistance Modified independent "   Feeding Where Assessed Wheelchair   Grooming   Grooming Adaptive Equipment (Pt performed grooming seated in wheelchair)   Grooming Level of Assistance Modified independent   Hair Grooming Level of Assistance Modified independent   Grooming Where Assessed Wheelchair   Grooming Comments Pt brushed teeth prior to session and combed hair seated in wheelchair at bathroom sink,   Bathing   Bathing Adaptive Equipment Handheld shower head;Grab bar;Tub bench   Bathing Level of Assistance Supervision   Bathing Where Assessed Shower Chair/bench   Bathing Comments Pt (I) bathed all body parts during shower and required minimal assistance with collecting clothing/supplies, and required supervision for sit to stand from shower chair to maintain balance when bathing buttocks and yanet area.    UE Dressing   UE Dressing Adaptive Equipment (Seated on shower chair)   UE Dressing Level of Assistance Modified independent   UE Dressing Where Assessed (Shower Chair)   UE Dressing Comments Pt donned 2 pullover shirts seated on shower chair    LE Dressing   LE Dressing  Level of Assistance Stand by assistance   LE Dressing Level of Assistance Stand by assistance   Sock Level of Assistance Stand by assistance   LE Dressing Where Assessed (Seated on Shower Chair)   LE Dressing Comments Pt needed supervision for safety when maintaining standing balance to pull up underpants and pants over hips.   Toileting   Toileting Level of Assistance Supervision   Toileting Where Assessed Toilet   Toileting Comments RW used for safety and balance when adjusting clothing before and after toileting.   Additional Activities   Additional Activities Other (Comment)   Additional Activities Comments  - Discussed DME recommendations, pt requesting shower chair, RW, and OP therapy. Pt declined w/c and BSC.    - Pt ambulated from gym to room with RW SBA in preparation for ADLs.      Communication   Comprehension 6   Mode B   Expression 6   Mode B   Social  Cognition   Social Interaction 7   Problem Solving 7   Memory 7        Activity Tolerance   Activity Tolerance Patient tolerated treatment well   Medical Staff Made Aware PT   After Treatment   Patient Position After Treatment Seated in wheelchair   Patient after treatment left call button in reach  (With seatbelt on)   Assessment   Prognosis Good   Problem List Decreased Self Care skills;Decreased safe judgment during ADL;Decreased cognition;Decreased endurance;Decreased IADLs;Decreased functional mobility   Assessment Pt demonstrates increased safety and (I) with ADLs and improved awareness of impaired standing balance secondary to weakness in R LE.     Long Term Goals   Pt Will Perform Supine To Sit Independently;New goal   Supine to Sit - Met/Not Met Not Met   Pt Will Perform Sit to Supine Independently   Supine to Sit - Met/Not Met Not Met   Pt Will Transfer To Bedside Commode With stand by assist;New goal   Transfer Bedside Commode -Met/Not Met Met   Pt Will Transfer To Shower With stand by assist;New goal   Transfer to Shower-Met/Not Met Met   Pt Will Perform Eating With modified independence;New goal   Eating - Met/Not Met Met   Pt Will Perform Grooming Independently;New goal   Grooming - Met/Not Met Not Met   Pt Will Perform Bathing With supervision;On shower seat/tub bench   Bathing - Met/Not Met Met   Pt Will Perform UE Dressing With modified independence;New goal   UE Dressing - Met/Not Met Met   Pt Will Perform LE Dressing With supervision;New goal   LE Dressing - Met/Not Met Not Met   Pt Will Perform Toileting With supervision;New goal   Toileting-Met/Not Met Met   Discharge Recommendations   Equipment Needed After Discharge 3-in-1 commode;wheelchair;walker, rolling;shower chair   Discharge Facility/Level Of Care Needs outpatient OT   Plan   Plan Alter current plan   Plan Comments Pt discharged from OT   Occupational Therapy Follow-up   OT Follow-up? No   Treatment/Billable Minutes   Self Care/Home  Management 45   Total Time 45     I certify that I was present in the room directing the student in service delivery and guiding them using my skilled judgment. As the co-signing therapist, I have reviewed the student's documentation and am responsible for the treatment, assessment and plan.    MERY Beasley  5/15/2017

## 2017-05-16 VITALS
TEMPERATURE: 98 F | HEIGHT: 62 IN | DIASTOLIC BLOOD PRESSURE: 85 MMHG | SYSTOLIC BLOOD PRESSURE: 134 MMHG | RESPIRATION RATE: 18 BRPM | HEART RATE: 60 BPM | OXYGEN SATURATION: 96 % | WEIGHT: 110.88 LBS | BODY MASS INDEX: 20.4 KG/M2

## 2017-05-16 PROCEDURE — 25000003 PHARM REV CODE 250: Performed by: HOSPITALIST

## 2017-05-16 PROCEDURE — 99238 HOSP IP/OBS DSCHRG MGMT 30/<: CPT | Mod: ,,, | Performed by: PHYSICAL MEDICINE & REHABILITATION

## 2017-05-16 PROCEDURE — 25000003 PHARM REV CODE 250: Performed by: PHYSICAL MEDICINE & REHABILITATION

## 2017-05-16 RX ORDER — AMLODIPINE BESYLATE 5 MG/1
5 TABLET ORAL DAILY
Start: 2017-05-16 | End: 2018-06-28

## 2017-05-16 RX ORDER — ATORVASTATIN CALCIUM 40 MG/1
40 TABLET, FILM COATED ORAL DAILY
Qty: 30 TABLET | Refills: 3 | Status: SHIPPED | OUTPATIENT
Start: 2017-05-16 | End: 2018-06-28

## 2017-05-16 RX ADMIN — DOCUSATE SODIUM 100 MG: 100 CAPSULE, LIQUID FILLED ORAL at 07:05

## 2017-05-16 RX ADMIN — AMLODIPINE BESYLATE 5 MG: 5 TABLET ORAL at 07:05

## 2017-05-16 RX ADMIN — ATORVASTATIN CALCIUM 40 MG: 20 TABLET, FILM COATED ORAL at 07:05

## 2017-05-16 RX ADMIN — ASPIRIN 81 MG: 81 TABLET, COATED ORAL at 07:05

## 2017-05-16 NOTE — DISCHARGE INSTRUCTIONS
[facility] [phone].  You will be contacted to schedule your therapy.    Harini Espitia, Rehab , please call if I may be of further assistance.

## 2017-05-16 NOTE — PROGRESS NOTES
SW delivered gale rolling walker and shower chair to rehab room.  Pt has been contacted to schedule outpatient therapy at [facility].  Pt will be transported home by her spouse.

## 2017-05-16 NOTE — DISCHARGE SUMMARY
[facility]  Discharge Summary      Admit Date: 5/2/2017    Discharge Date and Time:  05/16/2017      Attending Physician: Arlene Ramirez MD     Reason for Admission: Impaired mobility and ADLs     Procedures Performed: none    Hospital Course (synopsis of major diagnoses, care, treatment, and services provided during the course of the hospital stay): 71 year-old white woman with a history of hypertension and bilateral mastectomy due to fibroids presented to [facility] ED 4/28/2017 with complaints of weakness to the right leg and less so to the right arm that started the day Before. She also had some difficulty with coordination with the right hand and dropped things. Transferred to [facility] under [facility] for TIA with a full stroke workup and neurology consult. Found to have a 6 mm lacunar infarction in the posterior limb of the left internal capsule. She did not have any stenosis on her MRA of the brain and neck. Her echo showed diastolic dysfunction, but normal LV function and normal LA size. Her blood pressure was controlled with amlodipine 5 mg daily. Her LDL was found to be 173 and she was started on atorvastatin 40 mg daily. She was started on ASA 81 mg daily as wel  - Solid Diet Level: Dental Soft Liquid Diet Level: Thin Feed   Admitted to [facility] on 5/2/2017.      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, mild cog deficits, dysphagia  - continued on ASA/statin   - F/U with Neuro as OP       HTN --   - VSS, remained on Amlodipine daily  - reviewed at length importance of compliance and monitor BP/HR regularly      Functionally improved to: Mod I for bed mobility, Supervision/SBA for sit to stand, Supervision for transfers, WC prop Mod I   - Gait > 200ft with RW, SBA; cont to have some mild R knee instability   - Mod I w eating, grooming, UBD, SBA for LBD, CGA toileting   Cog: Mod I  "for memory, prob solving, SI; Ind with comp/Exp    Consults: none    Significant Diagnostic Studies:  Recent Laboratory Results:        Lab Results   Component Value Date     CREATININE 0.9 05/03/2017     BUN 17 05/03/2017      05/03/2017     K 4.0 05/03/2017      05/03/2017     CO2 31 (H) 05/03/2017            Lab Results   Component Value Date     WBC 4.74 05/03/2017     HGB 13.1 05/03/2017     HCT 40.7 05/03/2017     MCV 94 05/03/2017      05/03/2017      No results found for: PTT      Final Diagnoses:    Principal Problem: <principal problem not specified>   Secondary Diagnoses:   Active Hospital Problems    Diagnosis  POA    Left-sided cerebrovascular accident (CVA) [I63.9]  Yes    Lacunar stroke of left subthalamic region [I63.50]  Yes    Essential hypertension, benign [I10]  Yes     Chronic    Leg weakness [R29.898]  Yes      Resolved Hospital Problems    Diagnosis Date Resolved POA   No resolved problems to display.       Discharged Condition: stable    Disposition: Home or Self Care    Follow Up/Patient Instructions:     Medications:  Reconciled Home Medications:   Current Discharge Medication List      CONTINUE these medications which have CHANGED    Details   amlodipine (NORVASC) 5 MG tablet Take 1 tablet (5 mg total) by mouth once daily.      atorvastatin (LIPITOR) 40 MG tablet Take 1 tablet (40 mg total) by mouth once daily.  Qty: 30 tablet, Refills: 3         CONTINUE these medications which have NOT CHANGED    Details   aspirin (ECOTRIN) 81 MG EC tablet Take 1 tablet (81 mg total) by mouth once daily.  Refills: 0         STOP taking these medications       ibuprofen (ADVIL,MOTRIN) 200 MG tablet Comments:   Reason for Stopping:               Discharge Procedure Orders  WALKER FOR HOME USE   Order Specific Question Answer Comments   Type of Walker: Jose (4'4"-5'7")    With wheels? Yes    Height: 5' 2" (1.575 m)    Weight: 50.3 kg (110 lb 14.3 oz)    Length of need (1-99 months): " "99    Does patient have medical equipment at home? caneyara pt was not using PTA / pt was not using PTA   Does patient have medical equipment at home? other (see comments)    Please check all that apply: Patient's condition impairs ambulation.    Please check all that apply: Patient is unable to safely ambulate without equipment.    Please check all that apply: Patient needs help to get in and out of chair.    Vendor: [facility] to pull from Rehab closet    Expected Date of Delivery: 5/16/2017      BATH/SHOWER CHAIR FOR HOME USE   Order Specific Question Answer Comments   Height: 5' 2" (1.575 m)    Weight: 50.3 kg (110 lb 14.3 oz)    Does patient have medical equipment at home? caneyara pt was not using PTA / pt was not using PTA   Does patient have medical equipment at home? other (see comments)    Length of need (1-99 months): 99    Type: With back    Vendor: [facility] to pull from Rehab closet    Expected Date of Delivery: 5/16/2017      Referral to OutPatient Speech Therapy   Referral Priority: Routine Referral Type: Speech Therapy   Referral Reason: Specialty Services Required    Requested Specialty: Speech Pathology    Number of Visits Requested: 1      Referral to Outpatient Occupational therapy   Referral Priority: Routine Referral Type: Occupational Therapy   Referral Reason: Specialty Services Required    Requested Specialty: Occupational Therapy    Number of Visits Requested: 1      Referral to OutPatient  Physical therapy   Referral Priority: Routine Referral Type: Physical Medicine   Referral Reason: Specialty Services Required    Requested Specialty: Physical Therapy    Number of Visits Requested: 1      Diet general     Diet general   Order Specific Question Answer Comments   Additional restrictions: Cardiac (Low Na/Chol)        Follow-up Information     Follow up with Goran Stern MD In 1 month.    Specialty:  Cardiology    Contact information:    [address] [place] [address]  [phone]          Follow up with Arlene Ramirez MD In 2 months.    Specialty:  Physical Medicine and Rehabilitation    Why:  As needed    Contact information:    [address]  [address]  [facility] [address] [phone]          Follow up with [facility].    Specialty:  Neurology    Why:  managed at [facility] for a stroke     Contact information:    [address]  [facility] [address] [phone]    Additional information:    7th Floor        Future Appointments  Date Time Provider Department Center   6/1/2017 9:00 AM Soledad Rodrigez PA-C [facility] NEURO Santana Ramirez MD

## 2017-05-16 NOTE — NURSING
Son here. Discharge instructions given, verbalize understanding. Discharged to home in stable condition with belongings.

## 2017-05-18 ENCOUNTER — PATIENT OUTREACH (OUTPATIENT)
Dept: ADMINISTRATIVE | Facility: CLINIC | Age: 72
End: 2017-05-18
Payer: MEDICARE

## 2017-05-18 NOTE — PATIENT INSTRUCTIONS
Weakness (Uncertain Cause)  Based on your exam today, the exact cause of your weakness is not certain. However, your weakness does not seem to be a sign of a serious illness at this time. Keep an eye on your symptoms and get medical advice as instructed below.  Home care  · Rest at home today. Do not over-exert yourself.  · Take any medicine as prescribed.  · For the next few days, drink extra fluids (unless your healthcare provider wants you to restrict fluids for other reasons). Do not skip meals.  Follow-up care  Follow up with your healthcare provider or as advised.  When to seek medical advice  Call your healthcare provider for any of the following  · Worsening of your symptoms  · Symptoms don't start getting better within 2 days  · Fever of 100.4º F (38º C) or higher, or as directed by your healthcare provider·    Call 911  Get emergency medical care for any of these:  · Chest, arm, neck, jaw or upper back pain  · Trouble breathing  · Numbness or weakness of the face, one arm or one leg  · Slurred speech, confusion, trouble speaking, walking or seeing  · Blood in vomit or stool (black or red color)  · Loss of consciousness  Date Last Reviewed: 6/10/2015  © 5151-7330 [employer]. [address] [place] [address], [place] [address]. All rights reserved. This information is not intended as a substitute for professional medical care. Always follow your healthcare professional's instructions.

## 2017-06-01 ENCOUNTER — OFFICE VISIT (OUTPATIENT)
Dept: NEUROLOGY | Facility: CLINIC | Age: 72
End: 2017-06-01
Payer: MEDICARE

## 2017-06-01 VITALS
SYSTOLIC BLOOD PRESSURE: 138 MMHG | HEART RATE: 63 BPM | BODY MASS INDEX: 21.34 KG/M2 | HEIGHT: 62 IN | DIASTOLIC BLOOD PRESSURE: 78 MMHG | WEIGHT: 115.94 LBS

## 2017-06-01 DIAGNOSIS — E78.5 HYPERLIPIDEMIA LDL GOAL <70: ICD-10-CM

## 2017-06-01 DIAGNOSIS — R29.898 WEAKNESS OF RIGHT LOWER EXTREMITY: ICD-10-CM

## 2017-06-01 DIAGNOSIS — I10 ESSENTIAL HYPERTENSION, BENIGN: Chronic | ICD-10-CM

## 2017-06-01 DIAGNOSIS — I63.81 LACUNAR STROKE OF LEFT SUBTHALAMIC REGION: Primary | ICD-10-CM

## 2017-06-01 DIAGNOSIS — Z87.891 FORMER SMOKER: ICD-10-CM

## 2017-06-01 PROCEDURE — 1126F AMNT PAIN NOTED NONE PRSNT: CPT | Mod: S$GLB,,, | Performed by: PHYSICIAN ASSISTANT

## 2017-06-01 PROCEDURE — 1159F MED LIST DOCD IN RCRD: CPT | Mod: S$GLB,,, | Performed by: PHYSICIAN ASSISTANT

## 2017-06-01 PROCEDURE — 99999 PR PBB SHADOW E&M-EST. PATIENT-LVL III: CPT | Mod: PBBFAC,,, | Performed by: PHYSICIAN ASSISTANT

## 2017-06-01 PROCEDURE — 99213 OFFICE O/P EST LOW 20 MIN: CPT | Mod: S$GLB,,, | Performed by: PHYSICIAN ASSISTANT

## 2017-06-01 NOTE — LETTER
June 1, 2017      Arlene Ramirez MD  [address]  [address]  [facility] [address]           [facility]  [address]  [place] [address]  Phone: [phone]  Fax: [phone]          Patient: Antonina Butler   MR Number: [id number]   YOB: 1945   Date of Visit: 6/1/2017       Dear Dr. Arlene Ramirez:    Thank you for referring Antonina Butler to me for evaluation. Attached you will find relevant portions of my assessment and plan of care.    If you have questions, please do not hesitate to call me. I look forward to following Antonina Butler along with you.    Sincerely,    Soledad Rodrigez PA-C    Enclosure  CC:  No Recipients    If you would like to receive this communication electronically, please contact [email or web address] or [phone] to request more information on [employer] Link access.    For providers and/or their staff who would like to refer a patient to [facility], please contact us through our one-stop-shop provider referral line, [facility], at [phone].    If you feel you have received this communication in error or would no longer like to receive these types of communications, please e-mail [email or web address]

## 2017-06-01 NOTE — PROGRESS NOTES
[facility], Department of Neurology   [facility] [place] [address]  Phone:[phone]  Fax: [phone]  Hospital Follow Up    Patient Name: Antonina Butler  : 1945  MRN:  [id number]    2017     chief complaint: Hospital Follow Up    PCP: Goran Stern MD.    Assessment:     ICD-10-CM ICD-9-CM   1. Lacunar stroke of left subthalamic region I63.50 434.91   2. Essential hypertension, benign I10 401.1   3. Weakness of right lower extremity R29.898 729.89   4. Former smoker Z87.891 V15.82   5. Hyperlipidemia LDL goal <70 E78.5 272.4   L posterior IC stroke, likely lacunar   Stroke risk factors: HTN, HLD, recently quit smoking       Plan:   -All test results as well as any necessary instructions were reviewed and discussed with patient.  -Continue ASA 81mg   -Continue lipitor 40mg   -Congratulated on quitting smoking   -RTO- as needed   Stroke education discussion: Continue to address with PCP these risk factor guidelines: SBP<130, FBS<100, LDL<70 mg/dL, antiplatelet. Continue PT/OT/SLT, avoid tobacco, abstain from ETOH (<3 bevs optimal a week), stay well hydrated, avoid PO intake of NaCl, regular sleep c good sleep hygiene discussed at length. Will have patient continue to address this with PCP. Discussed CVA symptoms with patient at length, etiology of CVA and need to remain compliant on all medications. Mentioned that medication is preventative however nothing is absolute. Robust recovery first 4-6 months up to a year for noticeable improvement. If symptomatic, will need to report to ED in <2h for prompt eval. Patient voiced understanding.  All questions answered. The patient indicates understanding of these issues and agrees to the plan.    HPI:   Antonina Butler is a 71 y.o.  female. She presents alone for hospital follow up from L IC stroke causing R hemiparesis. She was started on ASA 81mg and atorvastatin 40mg for secondary stroke prevention. She was  "discharged to IP rehab at [facility]. She is doing OP therapy right now. Currently ambulates with cane. No new stroke symptoms. She has continued R leg weakness. States she quit smoking since the stroke.       Last visit:   Antonina Butler is a 71 year-old white woman with a history of hypertension and bilateral mastectomy due to fibroids.   She presented to [facility] ED with complaints of weakness to the right leg and less so to the right arm that started the day before.  She previously had some episodes where she felt weak on the right, but never this severe.  She denied any pain associated with this.  No headache, neck pain, or back pain.  No numbness.  She also had some difficulty with coordination with the right hand and dropped things.  ED workup showed normal CBC,CMP,  elevated cholestoral, negative troponin, normal TSH, and a normal chest x-ray and head CT.  She was admitted to [facility] under [facility] for stroke workup and neurology consult.  Upon arrival, she was mildly confused.  She could not remember the year and when asked why she came to the hospital, she stated, "Because someone was chasing me."  She was oriented to person and place.  She had a slight left facial droop and mild left leg and left arm weakness on exam.   Indwelling Lines/Drains at time of discharge: None  Hospital Course:   Ms. Butler underwent stroke evaluation. She was found to have a 6 mm lacunar infarction in the posterior limb of the left internal capsule. She did not have any stenosis on her MRA of the brain and neck. Her echo showed diastolic dysfunction, but normal LV function and normal LA size. Her blood pressure was controlled with amlodipine 5 mg daily. Her LDL was found to be 173 and she was started on atorvastatin 40 mg daily. She was started on ASA 81 mg daily as well. She was evaluated by [facility] and those recommendations were followed. PT/OT/SLP evaluated her and recommended " inpatient rehab. She was accepted to [facility] on 5/01/17 but could not be transferred until 5/02/17. She was discharged by a hospitalist who took over her care on 5/02/17 and made no other changes.     Medication Reconciliation:   Current Outpatient Prescriptions   Medication Sig Dispense Refill    amlodipine (NORVASC) 5 MG tablet Take 1 tablet (5 mg total) by mouth once daily.      aspirin (ECOTRIN) 81 MG EC tablet Take 1 tablet (81 mg total) by mouth once daily.  0    atorvastatin (LIPITOR) 40 MG tablet Take 1 tablet (40 mg total) by mouth once daily. 30 tablet 3     No current facility-administered medications for this visit.      Review of patient's allergies indicates:  No Known Allergies    PMHx:  Past Medical History:   Diagnosis Date    Arthritis     Hypertension     Lacunar stroke of left subthalamic region 04/28/2017    Stroke      Past Surgical History:   Procedure Laterality Date    APPENDECTOMY      BREAST SURGERY      Bilateral mastectomy    COSMETIC SURGERY      EYE SURGERY      HYSTERECTOMY      MASTECTOMY         Fhx:  Family History   Problem Relation Age of Onset    Hypertension Mother     Heart disease Father     Diabetes Maternal Grandmother        Shx:   Social History     Social History    Marital status:      Spouse name: N/A    Number of children: N/A    Years of education: N/A     Occupational History    Not on file.     Social History Main Topics    Smoking status: Current Every Day Smoker     Packs/day: 0.50     Years: 15.00    Smokeless tobacco: Not on file    Alcohol use No    Drug use: No    Sexual activity: Not on file     Other Topics Concern    Not on file     Social History Narrative    No narrative on file         Labs:   Lab Results   Component Value Date    TSH 1.521 04/29/2017     Lab Results   Component Value Date    HGBA1C 5.6 04/29/2017     Lab Results   Component Value Date    CHOL 282 (H) 04/29/2017    CHOL 322 (H) 04/28/2017  "   CHOL 284 (H) 07/27/2016     Lab Results   Component Value Date    HDL 87 (H) 04/29/2017     (H) 04/28/2017    HDL 95 (H) 07/27/2016     Lab Results   Component Value Date    LDLCALC 173.2 (H) 04/29/2017    LDLCALC 193.4 (H) 04/28/2017    LDLCALC 170.0 (H) 07/27/2016     Lab Results   Component Value Date    TRIG 109 04/29/2017    TRIG 98 04/28/2017    TRIG 95 07/27/2016     Lab Results   Component Value Date    CHOLHDL 30.9 04/29/2017    CHOLHDL 33.9 04/28/2017    CHOLHDL 33.5 07/27/2016       Imaging:   MRI/A brain and neck 4/28/17:   Impression      Small acute infarct left posterior limb internal capsule. Normal vessels noted on MRA head and neck, no significant stenosis or aneurysm.      ECHO   CONCLUSIONS     1 - Normal left ventricular systolic function (EF 60-65%).     2 - Impaired LV relaxation, increased LVEDP.     3 - No wall motion abnormalities.     4 - Concentric remodeling.     5 - Normal right ventricular systolic function .     6 - The estimated PA systolic pressure is 28 mmHg    Note: I have independently reviewed any/all imaging/labs/tests and agree with the report (s) as documented.  Any discrepancies will be as noted/demarcated by free text.  DARLYN FRENCH 6/1/2017      ROS:   Review Of Systems (questions asked, positive or additions in BOLD)  Gen: Weight change, fatigue/malaise, pyrexia   HEENT: Tinnitus, headache,  blurred vision, eye pain, diplopia, photophobia   Card: Palpitations, CP   Pulm: SOB   Vas: Easy bruising, easy bleeding   GI: N/V/D/C, incontinence, hematemesis, hematochezia    : incontinence, hematuria   Endocrine: Temp intolerance, polyuria, polydipsia   M/S: Neck pain, myalgia, back pain, joint pain, falls    Neuro: PER HPI   PSY: Memory loss, confusion, depression, anxiety, trouble in sleep        EXAM:   /78   Pulse 63   Ht 5' 2" (1.575 m)   Wt 52.6 kg (115 lb 15.4 oz)   BMI 21.21 kg/m²      There were no vitals taken for this visit.   Well developed, well " nourished female  Extremities: no edema  NIH: 0  Mental status:   Awake, alert and appropriately oriented   Normal recent and remote memory   Normal attention and concentration   Normal speech and language   Normal fund of knowledge   No extinction  Cranial nerves:   PERRLA   EOMF without nystagmus   VFF   Normal facial sensation   Normal facial movements   Intact hearing bilaterally   Palate elevates symmetrically   Normal SCM and trapezius strength   Tongue midline  Motor:   No pronator drift   Normal FF movements bilaterally   Normal muscle tone, bulk and power   No abnormal movements  Sensory   Intact to LT  DTRs   2+ and symmetric  Coordination   Intact to FNF and HTS  Gait   Ambulates with cane, mild hemiparetic gait    Attending, Dr. Schultz, was available during today's encounter. Any change to plan along with cosign to appear in the EMR.         This document has been electronically signed by Soledad Rodrigez MPA, PA-C on 6/1/2017, 9:08 AM. I have personally typed this message using the EMR.       CC: Goran Stern MD

## 2018-01-22 NOTE — PT/OT/SLP EVAL
Speech Language Pathology  Evaluation    Antonina Butler   MRN: [id number]   Admitting Diagnosis: Leg weakness    Diet recommendations: Mechanical soft diet with thin liquids.  Feed only when awake/alert, No straws, HOB to 90 degrees, Small bites/sips, Alternating bites/sips, Remain upright 30 minutes post meal, Meds crushed in puree, Eliminate distractions, Avoid talking while eating and Assistance with meals    SLP Treatment Date: 04/29/17  Speech Start Time: 0930     Speech Stop Time: 0947     Speech Total (min): 17 min       TREATMENT BILLABLE MINUTES:  Eval Swallow and Oral Function 17 minutes    Diagnosis: Leg weakness    Antonina Butler is a 71-year-old female with a history of hypertension, and bilateral mastectomy due to fibroids who presented to [facility] ED with complaints of weakness to the right leg and less so to the right arm. This started yesterday. She states she previously had had some episodes where she felt weak on the right, but never this severe. Denies any pain associated with this. No headache, neck pain, or back pain. No numbness. She states she also has had some difficulty with coordination with the right hand and drops things. ED workup showed normal CBC,CMP, Elevated Cholestoral, Negative Troponin, Normal TSH, and a normal, Chest X-Ray and Head CT. Per medical record, head CT showed evidence of chronic infarct with no acute mass or hemorrhage.    Past Medical History:   Diagnosis Date    Arthritis     Hypertension      Past Surgical History:   Procedure Laterality Date    APPENDECTOMY      BREAST SURGERY      Bilateral mastectomy    HYSTERECTOMY         Has the patient been evaluated by SLP for swallowing? : Yes  Keep patient NPO?: No   General Precautions: Standard, aspiration, fall    Current Respiratory Status:  (room air)         Prior diet: Regular per pt.      Subjective:  Pt awake, alert, and cooperative.    Patient goals: Pt requesting eating/drinking and going  home.    Pain Ratin/10              Pain Rating Post-Intervention: 0/10    Objective:   Patient found with: blood pressure cuff, telemetry    Oral Musculature Evaluation  Oral Musculature: WNL  Dentition: upper dentures  Mucosal Quality: good  Mandibular Strength and Mobility: WNL  Oral Labial Strength and Mobility: WNL  Lingual Strength and Mobility: WNL  Velar Elevation: WNL  Voice Prior to PO Intake: Vocal quality is clear. Speech is precise and judged to be 100% intelligible during conversation.     COGNITION and LANGUAGE: Cues needed to sustain attention over time in a quiet environment. Easily distracted and mildly impulsive. Demonstrated orientation to name, name of hospital, reason for hospitalization, year, and month. Not date. Appropriately described deficits, but was unable to describe functional impact of deficits or answer questions related to safety and assist needed. Followed simple directions 100% of the time given cues for attention. Verbal expression is fluent with sentences of normal length and complexity. Expressing wants and needs 100% of the time.    Bedside Swallow Eval:  Consistencies Assessed: Thin liquids via cup and straw, Puree 3ml bites via spoon and Solids small bites of juan carlos cracker  Oral Phase: slow oral transit time. Lip seal, bolus formation, and A-P transit WNL. Slow mastication of solids. No oral residue seen after swallowing.  Pharyngeal Phase: Adequate hyolaryngeal elevation noted subjectively upon palpation. Overt s/s of airway penetration and/or aspiration characterized by brief coughing after straw sips of water. Eliminated with small cup sips. No additional coughing, choking, throat clearing, or change in vocal quality noted. S/s of pharyngeal residue characterized by multiple swallows per bolus.    Additional Treatment:      FIM:        Problem Solvin Supervision--The patient requires supervision (e.g., cueing or coaxing) to solve less routine problems only under  stressful or unfamiliar conditions, but no more than 10% of the time.                      Assessment:  Antonina Butler is a 71 y.o. female with a medical diagnosis of Leg weakness and presents with s/s of cognitive communication deficits and mild oral and pharyngeal dysphagia.    Do you have any cultural, spiritual, Baptist conflicts, given your current situation?: none (none)     Discharge recommendations: Discharge Facility/Level Of Care Needs:  (Continue SLP services at next level of care.)     Goals:   SLP Goals        Problem: SLP Goal    Goal Priority Disciplines Outcome   SLP Goal     SLP Ongoing (interventions implemented as appropriate)   Description:  Short Term Goals:  1. Pt will consume % of mechanical soft diet with no s/s of airway penetration and/or aspiration given min assist.  2. Pt will implement safe swallowing strategies 100% of the time during meals given no assist.   3. Pt will answer safety questions with 100% accuracy given min assist.                Plan:   Patient to be seen Therapy Frequency: 3 x/week   Plan of Care expires: 05/28/17  Plan of Care reviewed with: patient     SLP - Next Visit Date: 04/30/17           Evan Head CCC-SLP  04/29/2017             2

## 2018-06-12 ENCOUNTER — TELEPHONE (OUTPATIENT)
Dept: GASTROENTEROLOGY | Facility: CLINIC | Age: 73
End: 2018-06-12

## 2018-06-28 ENCOUNTER — INITIAL CONSULT (OUTPATIENT)
Dept: GASTROENTEROLOGY | Facility: CLINIC | Age: 73
End: 2018-06-28
Payer: MEDICARE

## 2018-06-28 VITALS
WEIGHT: 105.81 LBS | HEIGHT: 62 IN | BODY MASS INDEX: 19.47 KG/M2 | HEART RATE: 71 BPM | DIASTOLIC BLOOD PRESSURE: 84 MMHG | SYSTOLIC BLOOD PRESSURE: 112 MMHG | RESPIRATION RATE: 16 BRPM

## 2018-06-28 DIAGNOSIS — Z12.11 SCREEN FOR COLON CANCER: Primary | ICD-10-CM

## 2018-06-28 PROCEDURE — 99499 UNLISTED E&M SERVICE: CPT | Mod: S$GLB,,, | Performed by: INTERNAL MEDICINE

## 2018-06-28 RX ORDER — SODIUM, POTASSIUM,MAG SULFATES 17.5-3.13G
1 SOLUTION, RECONSTITUTED, ORAL ORAL DAILY
Qty: 1 KIT | Refills: 0 | Status: SHIPPED | OUTPATIENT
Start: 2018-06-28 | End: 2018-06-30

## 2018-06-28 NOTE — PROGRESS NOTES
Subjective:       Patient ID: Antonina Butler is a 72 y.o. female.    Chief Complaint: Rakesh wants her to have colonoscopy (Pt would rather mail in her specimen)    71 yo F here to discuss colonoscopy.  Phoebe is calling her to encourage her to have colonoscopy, but she wants to do stool specimen collection instead.  She had attempted colonoscopy many years ago due to abdominal pain, and was told at that time that the endoscopist could not go all the way around her colon due to anatomy.  She was then dx with acute diverticulitis, which resolved.  Because of that history and risk of perforation at that time, she is concerned about attempting another colonoscopy.  She denies FH of colon cancer, change in bowel habits, rectal bleeding, or weight loss.      Review of Systems   Constitutional: Negative for appetite change and unexpected weight change.   Eyes: Negative for photophobia and visual disturbance.   Respiratory: Negative for chest tightness, shortness of breath and wheezing.    Cardiovascular: Negative for chest pain, palpitations and leg swelling.   Genitourinary: Negative for dysuria, flank pain and hematuria.   Musculoskeletal: Negative for joint swelling and myalgias.   Skin: Negative for color change and rash.   Neurological: Negative for dizziness and speech difficulty.   Psychiatric/Behavioral: Negative for confusion and hallucinations.       Objective:      Physical Exam   Constitutional: She is oriented to person, place, and time. She appears well-developed and well-nourished.   Eyes: EOM are normal. Pupils are equal, round, and reactive to light.   Neck: Normal range of motion. Neck supple.   Cardiovascular: Normal rate and regular rhythm.    Pulmonary/Chest: Effort normal and breath sounds normal.   Abdominal: Soft. Bowel sounds are normal. She exhibits no distension and no mass. There is no tenderness. There is no rebound and no guarding.   Musculoskeletal: Normal range of motion. She exhibits  no edema.   Neurological: She is alert and oriented to person, place, and time.   Psychiatric: She has a normal mood and affect. Her behavior is normal. Judgment and thought content normal.       Assessment:       1. Screen for colon cancer        Plan:       Screen for colon cancer  -     sodium,potassium,mag sulfates (SUPREP BOWEL PREP KIT) 17.5-3.13-1.6 gram SolR; Take 177 mLs by mouth once daily. for 2 days  Dispense: 1 kit; Refill: 0  -     Case request GI: COLONOSCOPY  -     Case d/w pt extensively.  I am not in favor of Cologuard or other stool kits as they are not sensitive enough for polyp detection.  Her perforation risk was much higher previously, and the difficulty of the procedure was far greater at that time for what ultimately would be dx as acute diverticulitis.  Although there is still a risk of perforation or unsuccessful colonoscopy, both are far less than previous.  Pt is willing to attempt colonoscopy.

## 2018-06-28 NOTE — LETTER
June 28, 2018      Rosa M Stern MD  [address] [place] [address]           [facility]  [address] , [address]   [place] [address]  Phone: [phone]  Fax: [phone]          Patient: Antonina Butler   MR Number: [id number]   YOB: 1945   Date of Visit: 6/28/2018       Dear Dr. Rosa M Stern:    Thank you for referring Antonina Butler to me for evaluation. Attached you will find relevant portions of my assessment and plan of care.    If you have questions, please do not hesitate to call me. I look forward to following Antonina Butler along with you.    Sincerely,    Vernell Grimaldo MD    Enclosure  CC:  No Recipients    If you would like to receive this communication electronically, please contact [email or web address] or [phone] to request more information on [employer] Link access.    For providers and/or their staff who would like to refer a patient to [facility], please contact us through our one-stop-shop provider referral line, [facility], at [phone].    If you feel you have received this communication in error or would no longer like to receive these types of communications, please e-mail [email or web address]

## 2018-07-16 ENCOUNTER — TELEPHONE (OUTPATIENT)
Dept: GASTROENTEROLOGY | Facility: CLINIC | Age: 73
End: 2018-07-16

## 2018-07-18 ENCOUNTER — TELEPHONE (OUTPATIENT)
Dept: GASTROENTEROLOGY | Facility: CLINIC | Age: 73
End: 2018-07-18

## 2018-07-18 NOTE — TELEPHONE ENCOUNTER
A message was left on patient's voicemail to give the office a call back in regards to test results from Colonoscopy.

## 2018-07-18 NOTE — TELEPHONE ENCOUNTER
----- Message from [place]ia Kaminski sent at 7/17/2018  4:34 PM CDT -----  Contact: [phone] or [phone]/self  Patient would like to get test results from 7/6/18. Please call and advise.

## 2018-07-20 ENCOUNTER — TELEPHONE (OUTPATIENT)
Dept: GASTROENTEROLOGY | Facility: CLINIC | Age: 73
End: 2018-07-20

## 2018-07-20 NOTE — TELEPHONE ENCOUNTER
----- Message from Willy Hernandez sent at 7/20/2018  2:19 PM CDT -----  Contact: self/[phone]  Patient is calling to get results from recent pathology report.      Please call and advise.

## 2018-07-20 NOTE — TELEPHONE ENCOUNTER
A message was left on patient's voicemail that the office was returning her call in regards to Pathology results.

## 2018-07-25 ENCOUNTER — TELEPHONE (OUTPATIENT)
Dept: GASTROENTEROLOGY | Facility: CLINIC | Age: 73
End: 2018-07-25

## 2018-09-14 NOTE — PLAN OF CARE
Problem: Fall Risk (Adult)  Goal: Identify Related Risk Factors and Signs and Symptoms  Related risk factors and signs and symptoms are identified upon initiation of Human Response Clinical Practice Guideline (CPG)   Outcome: Ongoing (interventions implemented as appropriate)  Remains free from falls/ injury. Instructed to call for assistance as needed during night. Call light in reach. Compliant with safety instructions.     Problem: Mobility, Physical Impaired (Adult)  Goal: Identify Related Risk Factors and Signs and Symptoms  Related risk factors and signs and symptoms are identified upon initiation of Human Response Clinical Practice Guideline (CPG)   Outcome: Ongoing (interventions implemented as appropriate)  Transferred from wheelchair to toilet with standby assistance. Tolerated well.       
Problem: Fall Risk (Adult)  Goal: Identify Related Risk Factors and Signs and Symptoms  Related risk factors and signs and symptoms are identified upon initiation of Human Response Clinical Practice Guideline (CPG)   Outcome: Ongoing (interventions implemented as appropriate)  Remains free from falls/injury. Instructed to call for assistance as needed during night . Call light in reach. Verbalized understanding.    Problem: Mobility, Physical Impaired (Adult)  Goal: Identify Related Risk Factors and Signs and Symptoms  Related risk factors and signs and symptoms are identified upon initiation of Human Response Clinical Practice Guideline (CPG)   Outcome: Ongoing (interventions implemented as appropriate)  Transferred from wheelchair to toilet with standby assistance. Tolerated well.      
Problem: Patient Care Overview  Goal: Plan of Care Review  Outcome: Ongoing (interventions implemented as appropriate)  Fall Risk: Encouraged patient to continue to call for staff assistance with all transfers. Explained to patient due to her recent change in medical condition, she is at increased risk for falls. Verbalized understanding. Will maintain safety precautions and follow up as needed.      
Problem: Patient Care Overview  Goal: Plan of Care Review  Outcome: Ongoing (interventions implemented as appropriate)  POC reviewed with pt who verbalized understanding. Pt AAOX4.  Remains free of falls and injury. VSS and afebrile.   Encouraged pt repositioned to help decrease pressure. Pt sleeping throughout the night.  Infection control measures taken, such as hand washing and proper disposal of contaminated materials.Safety rounds maintained according to protocol, environmental consistency maintained. Antonina Butler agrees to call when assistance is needed per call light which is within reach. No acute events. No distress noted. WCTM.           
Problem: Patient Care Overview  Goal: Plan of Care Review  Outcome: Ongoing (interventions implemented as appropriate)  POC reviewed with pt who verbalized understanding. Pt AAOX4.  Remains free of falls and injury. VSS and afebrile.  CEncouraged pt repositioned to help decrease pressure. Pt sleeping throughout the night.  Infection control measures taken, such as hand washing and proper disposal of contaminated materials.Safety rounds maintained according to protocol, environmental consistency maintained. Antonina Butler agrees to call when assistance is needed per call light which is within reach. No acute events. No distress noted. WCTM.         Problem: Fall Risk (Adult)  Goal: Identify Related Risk Factors and Signs and Symptoms  Related risk factors and signs and symptoms are identified upon initiation of Human Response Clinical Practice Guideline (CPG)   Outcome: Ongoing (interventions implemented as appropriate)  Patient able to understand, repeat, and comply with safety interventions (such as nonskid socks, low bed, call light in reach, etc).  Patient taught signs and symptoms of orthostatic hypotension and fall preventive measures.  Reflective lighting from bathroom used to minimize disorientation during the night while awaking.  Personal items within reach on personal table.  IV tubing/pump without tangles and on the same side as patients IV access.           
Problem: Patient Care Overview  Goal: Plan of Care Review  Outcome: Ongoing (interventions implemented as appropriate)  POC reviewed with pt who verbalized understanding. Pt AAOX4.  Remains free of falls and injury. VSS and afebrile.  Encouraged pt repositioned to help decrease pressure. Pt sleeping throughout the night.  Infection control measures taken, such as hand washing and proper disposal of contaminated materials.Safety rounds maintained according to protocol, environmental consistency maintained. Antonina Butler agrees to call when assistance is needed per call light which is within reach. No acute events. No distress noted. WCTM.           
Problem: Patient Care Overview  Goal: Plan of Care Review  Outcome: Ongoing (interventions implemented as appropriate)  Patient has denied any pain this shift. Patient is up ambulatory to bedside with assist with no falls or injuries noted. Patient is stable with no distress noted. Plan of care explained to patient and verbal acknowledgement of understanding from patient. Patient is resting with call light in reach, bed in low,locked position with bed alarm set. Will continue to monitor.      
Problem: Patient Care Overview  Goal: Plan of Care Review  Outcome: Ongoing (interventions implemented as appropriate)  Patient has denied any pain this shift. Patient is up ambulatory to bedside with assist with no falls or injuries noted. Patient is stable with no distress noted. Plan of care explained to patient and verbal acknowledgement of understanding from patient. Patient is resting with call light in reach, bed in low,locked position with bed alarm set. Will continue to monitor.      
Problem: Patient Care Overview  Goal: Plan of Care Review  Outcome: Ongoing (interventions implemented as appropriate)  Plan of care reviewed. Patient remains free of fall or injury this shift, safety measures maintained. Frequent rounds to assess pain & safety. No new skin breakdown noted. Patient is independent with repositioning. Patient is mod assist with transfers. Patient is resting well this shift.       
Problem: Patient Care Overview  Goal: Plan of Care Review  Outcome: Ongoing (interventions implemented as appropriate)  Pt awake, alert and oriented x4. Patient is continent of bowel and bladder, assisted to bathroom. Call light within reach. Bed in low position, wheels locked. Frequent rounds to ensure patient safety. Pt denies any pain, no acute distress noted, No new skin breakdown noted. VSS. Will continue to monitor      
Problem: Patient Care Overview  Goal: Plan of Care Review  Outcome: Ongoing (interventions implemented as appropriate)  Pt awake, alert and oriented x4. Patient is continent of bowel and bladder, assisted to bathroom. Call light within reach. Bed in low position, wheels locked. Frequent rounds to ensure patient safety. Pt denies any pain, no acute distress noted. No new skin breakdown noted. VSS. Will continue to monitor      
Problem: Patient Care Overview  Goal: Plan of Care Review  Outcome: Ongoing (interventions implemented as appropriate)  Pt awake, alert and oriented x4. Pt participated in therapy treatments. Patient is continent of bowel and bladder, assisted to bathroom. Pt up in chair. Call light within reach. Frequent rounds to ensure patient safety. Pt denies any pain, no acute distress noted. Will continue to monitor      
Problem: Patient Care Overview  Goal: Plan of Care Review  Outcome: Ongoing (interventions implemented as appropriate)  Pt awake, alert and oriented x4. Pt participated in therapy treatments. Patient is incontinent of bowel and bladder. Pt up in chair. Call light within reach. Frequent rounds to ensure patient safety. Pt denies any pain, no acute distress noted. VSS. Will continue to monitor      
Problem: Patient Care Overview  Goal: Plan of Care Review  Outcome: Ongoing (interventions implemented as appropriate)  Recommendations  1. Continue current diet   2. RD to monitor  Goals: PO intake >50% meals  Nutrition Goal Status: new  Communication of RD Recs: other (comment) (sticky note)     Continuum of Care Plan  D/C planning: adequate PO intake             
100

## 2021-01-15 ENCOUNTER — IMMUNIZATION (OUTPATIENT)
Dept: FAMILY MEDICINE | Facility: CLINIC | Age: 76
End: 2021-01-15
Payer: MEDICARE

## 2021-01-15 DIAGNOSIS — Z23 NEED FOR VACCINATION: Primary | ICD-10-CM

## 2021-01-15 PROCEDURE — 91300 COVID-19, MRNA, LNP-S, PF, 30 MCG/0.3 ML DOSE VACCINE: CPT | Mod: PBBFAC | Performed by: FAMILY MEDICINE

## 2021-02-05 ENCOUNTER — IMMUNIZATION (OUTPATIENT)
Dept: FAMILY MEDICINE | Facility: CLINIC | Age: 76
End: 2021-02-05
Payer: MEDICARE

## 2021-02-05 DIAGNOSIS — Z23 NEED FOR VACCINATION: Primary | ICD-10-CM

## 2021-02-05 PROCEDURE — 0002A COVID-19, MRNA, LNP-S, PF, 30 MCG/0.3 ML DOSE VACCINE: CPT | Mod: PBBFAC | Performed by: FAMILY MEDICINE

## 2021-02-05 PROCEDURE — 91300 COVID-19, MRNA, LNP-S, PF, 30 MCG/0.3 ML DOSE VACCINE: CPT | Mod: PBBFAC | Performed by: FAMILY MEDICINE

## 2022-08-17 ENCOUNTER — OFFICE VISIT (OUTPATIENT)
Dept: PODIATRY | Facility: CLINIC | Age: 77
End: 2022-08-17
Payer: MEDICARE

## 2022-08-17 VITALS — WEIGHT: 105 LBS | BODY MASS INDEX: 19.32 KG/M2 | HEIGHT: 62 IN

## 2022-08-17 DIAGNOSIS — M79.671 RIGHT FOOT PAIN: ICD-10-CM

## 2022-08-17 DIAGNOSIS — M25.571 ACUTE RIGHT ANKLE PAIN: ICD-10-CM

## 2022-08-17 DIAGNOSIS — S92.354A NONDISPLACED FRACTURE OF FIFTH METATARSAL BONE, RIGHT FOOT, INITIAL ENCOUNTER FOR CLOSED FRACTURE: Primary | ICD-10-CM

## 2022-08-17 DIAGNOSIS — S93.401A SPRAIN OF RIGHT ANKLE, UNSPECIFIED LIGAMENT, INITIAL ENCOUNTER: ICD-10-CM

## 2022-08-17 DIAGNOSIS — M79.89 PAIN AND SWELLING OF TOE OF RIGHT FOOT: ICD-10-CM

## 2022-08-17 DIAGNOSIS — M79.674 PAIN AND SWELLING OF TOE OF RIGHT FOOT: ICD-10-CM

## 2022-08-17 PROCEDURE — 99204 PR OFFICE/OUTPT VISIT, NEW, LEVL IV, 45-59 MIN: ICD-10-PCS | Mod: S$GLB,,, | Performed by: PODIATRIST

## 2022-08-17 PROCEDURE — 3288F FALL RISK ASSESSMENT DOCD: CPT | Mod: CPTII,S$GLB,, | Performed by: PODIATRIST

## 2022-08-17 PROCEDURE — 3288F PR FALLS RISK ASSESSMENT DOCUMENTED: ICD-10-PCS | Mod: CPTII,S$GLB,, | Performed by: PODIATRIST

## 2022-08-17 PROCEDURE — 99999 PR PBB SHADOW E&M-EST. PATIENT-LVL III: ICD-10-PCS | Mod: PBBFAC,,, | Performed by: PODIATRIST

## 2022-08-17 PROCEDURE — 1101F PT FALLS ASSESS-DOCD LE1/YR: CPT | Mod: CPTII,S$GLB,, | Performed by: PODIATRIST

## 2022-08-17 PROCEDURE — 1125F AMNT PAIN NOTED PAIN PRSNT: CPT | Mod: CPTII,S$GLB,, | Performed by: PODIATRIST

## 2022-08-17 PROCEDURE — 1160F RVW MEDS BY RX/DR IN RCRD: CPT | Mod: CPTII,S$GLB,, | Performed by: PODIATRIST

## 2022-08-17 PROCEDURE — 1159F PR MEDICATION LIST DOCUMENTED IN MEDICAL RECORD: ICD-10-PCS | Mod: CPTII,S$GLB,, | Performed by: PODIATRIST

## 2022-08-17 PROCEDURE — 99999 PR PBB SHADOW E&M-EST. PATIENT-LVL III: CPT | Mod: PBBFAC,,, | Performed by: PODIATRIST

## 2022-08-17 PROCEDURE — 1159F MED LIST DOCD IN RCRD: CPT | Mod: CPTII,S$GLB,, | Performed by: PODIATRIST

## 2022-08-17 PROCEDURE — 1125F PR PAIN SEVERITY QUANTIFIED, PAIN PRESENT: ICD-10-PCS | Mod: CPTII,S$GLB,, | Performed by: PODIATRIST

## 2022-08-17 PROCEDURE — 1160F PR REVIEW ALL MEDS BY PRESCRIBER/CLIN PHARMACIST DOCUMENTED: ICD-10-PCS | Mod: CPTII,S$GLB,, | Performed by: PODIATRIST

## 2022-08-17 PROCEDURE — 1101F PR PT FALLS ASSESS DOC 0-1 FALLS W/OUT INJ PAST YR: ICD-10-PCS | Mod: CPTII,S$GLB,, | Performed by: PODIATRIST

## 2022-08-17 PROCEDURE — 99204 OFFICE O/P NEW MOD 45 MIN: CPT | Mod: S$GLB,,, | Performed by: PODIATRIST

## 2022-08-17 RX ORDER — AMLODIPINE BESYLATE 5 MG/1
TABLET ORAL
COMMUNITY

## 2022-08-17 RX ORDER — ATORVASTATIN CALCIUM 40 MG/1
TABLET, FILM COATED ORAL
COMMUNITY

## 2022-08-17 NOTE — PROGRESS NOTES
Subjective:      Patient ID: Antonina Butler is a 76 y.o. female.    Chief Complaint: Foot Injury (Patient presents today as a new patient complaining she sprain her right foot. Patient stated she sprain her right foot a few days ago. )      76 y.o. female presenting with right ankle and right foot pain.  Patient had injury on August 13th. Tripped and fell over step stool. Was seen in ED and referred to me for further management.  Right ankle x-ray was taken without signs of fracture.  Right foot x-ray was not taken in ED.  complains of right foot right ankle pain.  Describes pain as throbbing and aching.  Patient has not been putting pressure on the right lower extremity due to pain.      Review of Systems   Constitutional: Negative for chills, decreased appetite, fever and malaise/fatigue.   HENT: Negative for congestion, ear discharge and sore throat.    Eyes: Negative for discharge and pain.   Cardiovascular: Negative for chest pain, claudication and leg swelling.   Respiratory: Negative for cough and shortness of breath.    Skin: Negative for color change, nail changes and rash.   Musculoskeletal: Positive for arthritis, joint pain, joint swelling and stiffness. Negative for muscle weakness.        Right foot right ankle pain   Gastrointestinal: Negative for bloating, abdominal pain, diarrhea, nausea and vomiting.   Genitourinary: Negative for flank pain and hematuria.   Neurological: Negative for headaches, numbness and weakness.   Psychiatric/Behavioral: Negative for altered mental status.             Past Medical History:   Diagnosis Date    Arthritis     Hypertension     Lacunar stroke of left subthalamic region 04/28/2017    Stroke        Past Surgical History:   Procedure Laterality Date    APPENDECTOMY      BREAST SURGERY      Bilateral mastectomy    COLONOSCOPY N/A 7/6/2018    Procedure: COLONOSCOPY;  Surgeon: Vernell Grimaldo MD;  [facility];  Service: Endoscopy;  Laterality: N/A;  "   COSMETIC SURGERY      EYE SURGERY      HYSTERECTOMY      MASTECTOMY Bilateral 1988    FIBROCYSTIC CHANGES, NO HISTORY OF BREAST CANCER       Family History   Problem Relation Age of Onset    Hypertension Mother     Heart disease Father     Diabetes Maternal Grandmother        Social History     Socioeconomic History    Marital status:    Tobacco Use    Smoking status: Current Every Day Smoker     Packs/day: 0.50     Years: 15.00     Pack years: 7.50    Smokeless tobacco: Never Used   Substance and Sexual Activity    Alcohol use: No    Drug use: No       Current Outpatient Medications   Medication Sig Dispense Refill    amLODIPine (NORVASC) 5 MG tablet amlodipine 5 mg tablet      atorvastatin (LIPITOR) 40 MG tablet atorvastatin 40 mg tablet   TAKE 1 TABLET BY MOUTH EVERY DAY      aspirin (ECOTRIN) 81 MG EC tablet Take 1 tablet (81 mg total) by mouth once daily.  0     No current facility-administered medications for this visit.       Review of patient's allergies indicates:   Allergen Reactions    Codeine        Vitals:    08/17/22 1128   Weight: 47.6 kg (105 lb)   Height: 5' 2" (1.575 m)   PainSc:   8       Objective:      Physical Exam  Constitutional:       General: She is not in acute distress.     Appearance: She is well-developed.   HENT:      Nose: Nose normal.   Eyes:      Conjunctiva/sclera: Conjunctivae normal.   Pulmonary:      Effort: Pulmonary effort is normal.   Chest:      Chest wall: No tenderness.   Abdominal:      Tenderness: There is no abdominal tenderness.   Musculoskeletal:      Cervical back: Normal range of motion.   Neurological:      Mental Status: She is alert and oriented to person, place, and time.   Psychiatric:         Behavior: Behavior normal.         Vascular: Distal DP/PT pulses palpable 1/4. CRT < 3 sec to tips of toes. No vericosities noted to LEs. Hair growth present LE, warm to touch LE.  Right lower extremity:  Moderate edema noted to " foot/ankle.    Dermatologic: No open lesions, lacerations or wounds. Interdigital spaces clean, dry and intact. No erythema, rubor, calor noted LE  Right lower extremity:  Ecchymosis noted dorsal lateral aspect of the midfoot.     Musculoskeletal: No calf tenderness LE, Compartments soft/compressible.  Right lower extremity:  Pain on palpation diffusely/circumferentially of the ankle, dorsal aspect of the foot, lateral aspect of the 5th metatarsal.  Limited range of motion of the ankle and pedal joint due to pain and swelling.     Neurological: Light touch, proprioception, and sharp/dull sensation are all intact. Protective threshold with the [place]-Wienstein monofilament is intact. Vibratory sensation intact.         Assessment:       Encounter Diagnoses   Name Primary?    Nondisplaced fracture of fifth metatarsal bone, right foot, initial encounter for closed fracture Yes    Acute right ankle pain     Sprain of right ankle, unspecified ligament, initial encounter     Right foot pain     Pain and swelling of toe of right foot          Plan:       Antonina was seen today for foot injury.    Diagnoses and all orders for this visit:    Nondisplaced fracture of fifth metatarsal bone, right foot, initial encounter for closed fracture    Acute right ankle pain  -     Ambulatory referral/consult to Podiatry    Sprain of right ankle, unspecified ligament, initial encounter  -     Ambulatory referral/consult to Podiatry    Right foot pain  -     X-Ray Foot Complete Right; Future    Pain and swelling of toe of right foot      I counseled the patient on her conditions, their implications and medical management.    76 y.o. female with right foot 5th metatarsal fracture nondisplaced.     -right ankle and right foot x-ray reviewed with the patient.  Nondisplaced fracture noted base of the 5th metatarsal.   -recommend ice and elevation of RLE. WBAT in CAM. CAM walker dispensed to pt. Diffuse pain over right foot and ankle.  Hard to pin point exact tender spots at this time. Recommend passive ROM exercise. WBAT in CAM for now. I offered pain medication but refused.     -I reviewed imaging, clinical history, and diagnosis as above with the patient. I attempted to use layman's terms to educate the patient as well as utilize foot models and/or pictures. I personally went through imaging with the patient.    -The nature of the condition, options for management, as well as potential risks and complications were discussed in detail with patient. Patient was amenable to my recommendations and left my office fully informed and will follow up as instructed or sooner if necessary.    -f/u 4 wks     Note dictated with voice recognition software, please excuse any grammatical errors.

## 2022-09-14 ENCOUNTER — OFFICE VISIT (OUTPATIENT)
Dept: PODIATRY | Facility: CLINIC | Age: 77
End: 2022-09-14
Payer: MEDICARE

## 2022-09-14 VITALS — HEIGHT: 62 IN | BODY MASS INDEX: 19.19 KG/M2 | WEIGHT: 104.31 LBS

## 2022-09-14 DIAGNOSIS — S92.354A NONDISPLACED FRACTURE OF FIFTH METATARSAL BONE, RIGHT FOOT, INITIAL ENCOUNTER FOR CLOSED FRACTURE: Primary | ICD-10-CM

## 2022-09-14 PROCEDURE — 99999 PR PBB SHADOW E&M-EST. PATIENT-LVL III: CPT | Mod: PBBFAC,,, | Performed by: PODIATRIST

## 2022-09-14 PROCEDURE — 1160F PR REVIEW ALL MEDS BY PRESCRIBER/CLIN PHARMACIST DOCUMENTED: ICD-10-PCS | Mod: CPTII,S$GLB,, | Performed by: PODIATRIST

## 2022-09-14 PROCEDURE — 1126F PR PAIN SEVERITY QUANTIFIED, NO PAIN PRESENT: ICD-10-PCS | Mod: CPTII,S$GLB,, | Performed by: PODIATRIST

## 2022-09-14 PROCEDURE — 1159F MED LIST DOCD IN RCRD: CPT | Mod: CPTII,S$GLB,, | Performed by: PODIATRIST

## 2022-09-14 PROCEDURE — 3288F PR FALLS RISK ASSESSMENT DOCUMENTED: ICD-10-PCS | Mod: CPTII,S$GLB,, | Performed by: PODIATRIST

## 2022-09-14 PROCEDURE — 99213 PR OFFICE/OUTPT VISIT, EST, LEVL III, 20-29 MIN: ICD-10-PCS | Mod: S$GLB,,, | Performed by: PODIATRIST

## 2022-09-14 PROCEDURE — 99999 PR PBB SHADOW E&M-EST. PATIENT-LVL III: ICD-10-PCS | Mod: PBBFAC,,, | Performed by: PODIATRIST

## 2022-09-14 PROCEDURE — 99213 OFFICE O/P EST LOW 20 MIN: CPT | Mod: S$GLB,,, | Performed by: PODIATRIST

## 2022-09-14 PROCEDURE — 1126F AMNT PAIN NOTED NONE PRSNT: CPT | Mod: CPTII,S$GLB,, | Performed by: PODIATRIST

## 2022-09-14 PROCEDURE — 1159F PR MEDICATION LIST DOCUMENTED IN MEDICAL RECORD: ICD-10-PCS | Mod: CPTII,S$GLB,, | Performed by: PODIATRIST

## 2022-09-14 PROCEDURE — 3288F FALL RISK ASSESSMENT DOCD: CPT | Mod: CPTII,S$GLB,, | Performed by: PODIATRIST

## 2022-09-14 PROCEDURE — 1101F PR PT FALLS ASSESS DOC 0-1 FALLS W/OUT INJ PAST YR: ICD-10-PCS | Mod: CPTII,S$GLB,, | Performed by: PODIATRIST

## 2022-09-14 PROCEDURE — 1101F PT FALLS ASSESS-DOCD LE1/YR: CPT | Mod: CPTII,S$GLB,, | Performed by: PODIATRIST

## 2022-09-14 PROCEDURE — 1160F RVW MEDS BY RX/DR IN RCRD: CPT | Mod: CPTII,S$GLB,, | Performed by: PODIATRIST

## 2022-09-14 NOTE — PROGRESS NOTES
Subjective:      Patient ID: Antonina Butler is a 76 y.o. female.    Chief Complaint: Follow-up (Patient presents today as a follow up for her right foot fractured.)      76 y.o. female presenting with right ankle and right foot pain.  Patient had injury on August 13th. Tripped and fell over step stool. Was seen in ED and referred to me for further management.  Right ankle x-ray was taken without signs of fracture.  Right foot x-ray was not taken in ED.  complains of right foot right ankle pain.  Describes pain as throbbing and aching.  Patient has not been putting pressure on the right lower extremity due to pain.    9/14/22 f/u R foot injury. Non displaced 5th met fx. In open toe shoes today.  Patient tells me she was not able to walk in short CAM.  Pain and swelling on right foot improved significantly compared to last visit.  Patient tells me she is now able to put pressure and be able to walk.    Review of Systems   Constitutional: Negative for chills, decreased appetite, fever and malaise/fatigue.   HENT:  Negative for congestion, ear discharge and sore throat.    Eyes:  Negative for discharge and pain.   Cardiovascular:  Negative for chest pain, claudication and leg swelling.   Respiratory:  Negative for cough and shortness of breath.    Skin:  Negative for color change, nail changes and rash.   Musculoskeletal:  Positive for arthritis and stiffness. Negative for joint pain, joint swelling and muscle weakness.        Right foot right ankle pain   Gastrointestinal:  Negative for bloating, abdominal pain, diarrhea, nausea and vomiting.   Genitourinary:  Negative for flank pain and hematuria.   Neurological:  Negative for headaches, numbness and weakness.   Psychiatric/Behavioral:  Negative for altered mental status.            Past Medical History:   Diagnosis Date    Arthritis     Hypertension     Lacunar stroke of left subthalamic region 04/28/2017    Stroke        Past Surgical History:   Procedure  "Laterality Date    APPENDECTOMY      BREAST SURGERY      Bilateral mastectomy    COLONOSCOPY N/A 7/6/2018    Procedure: COLONOSCOPY;  Surgeon: Vernell Grimaldo MD;  Location: [facility];  Service: Endoscopy;  Laterality: N/A;    COSMETIC SURGERY      EYE SURGERY      HYSTERECTOMY      MASTECTOMY Bilateral 1988    FIBROCYSTIC CHANGES, NO HISTORY OF BREAST CANCER       Family History   Problem Relation Age of Onset    Hypertension Mother     Heart disease Father     Diabetes Maternal Grandmother        Social History     Socioeconomic History    Marital status:    Tobacco Use    Smoking status: Every Day     Packs/day: 0.50     Years: 15.00     Pack years: 7.50     Types: Cigarettes    Smokeless tobacco: Never   Substance and Sexual Activity    Alcohol use: No    Drug use: No       Current Outpatient Medications   Medication Sig Dispense Refill    amLODIPine (NORVASC) 5 MG tablet amlodipine 5 mg tablet      atorvastatin (LIPITOR) 40 MG tablet atorvastatin 40 mg tablet   TAKE 1 TABLET BY MOUTH EVERY DAY      aspirin (ECOTRIN) 81 MG EC tablet Take 1 tablet (81 mg total) by mouth once daily.  0     No current facility-administered medications for this visit.       Review of patient's allergies indicates:   Allergen Reactions    Codeine        Vitals:    09/14/22 1058   Weight: 47.3 kg (104 lb 4.8 oz)   Height: 5' 2" (1.575 m)   PainSc: 0-No pain       Objective:      Physical Exam  Constitutional:       General: She is not in acute distress.     Appearance: She is well-developed.   HENT:      Nose: Nose normal.   Eyes:      Conjunctiva/sclera: Conjunctivae normal.   Pulmonary:      Effort: Pulmonary effort is normal.   Chest:      Chest wall: No tenderness.   Abdominal:      Tenderness: There is no abdominal tenderness.   Musculoskeletal:      Cervical back: Normal range of motion.   Neurological:      Mental Status: She is alert and oriented to person, place, and time.   Psychiatric:         Behavior: Behavior " normal.       Vascular: Distal DP/PT pulses palpable 1/4. CRT < 3 sec to tips of toes. No vericosities noted to LEs. Hair growth present LE, warm to touch LE.    Dermatologic: No open lesions, lacerations or wounds. Interdigital spaces clean, dry and intact. No erythema, rubor, calor noted LE    Musculoskeletal: No calf tenderness LE, Compartments soft/compressible.  Right lower extremity:  Mild tenderness to touch base of 5th metatarsal.     Neurological: Light touch, proprioception, and sharp/dull sensation are all intact. Protective threshold with the [place]-Wienstein monofilament is intact. Vibratory sensation intact.         Assessment:       Encounter Diagnosis   Name Primary?    Nondisplaced fracture of fifth metatarsal bone, right foot, initial encounter for closed fracture Yes           Plan:       Antonina was seen today for follow-up.    Diagnoses and all orders for this visit:    Nondisplaced fracture of fifth metatarsal bone, right foot, initial encounter for closed fracture      I counseled the patient on her conditions, their implications and medical management.    76 y.o. female with right foot 5th metatarsal fracture nondisplaced.     -right foot pain improved compared to last visit.  Pain is at base of 5th metatarsal secondary to nondisplaced fracture.  I recommend weight-bearing as tolerated in surgical shoe for 3-4 weeks instead of Cam walker.  Surgical shoe was dispensed.     -I reviewed imaging, clinical history, and diagnosis as above with the patient. I attempted to use layman's terms to educate the patient as well as utilize foot models and/or pictures. I personally went through imaging with the patient.    -The nature of the condition, options for management, as well as potential risks and complications were discussed in detail with patient. Patient was amenable to my recommendations and left my office fully informed and will follow up as instructed or sooner if necessary.    -f/u  prn    Note dictated with voice recognition software, please excuse any grammatical errors.

## 2023-11-07 NOTE — PROGRESS NOTES
The Sw spoke to Mary and she just received the IP rehab auth from [facility] but she can't admit the pt until tomorrow. The Sw spoke to Dr. Carolina and informed him of this info. The Sw spoke to Dylon and informed him as well. The pt will discharge to [facility] tomorrow.     3:57pm The Sw met with the pot and her family and she's in agreement with the d/c plan for [facility] tomorrow. The pt signed the pt choice form and the Sw placed it in her chart. The pt will discharge in the morning.    Negative

## 2024-09-24 NOTE — CONSULTS
Stroke/TIA History and Physical    HPI: Antonina Butler is a(n) 71 y.o. right handed female with PMHx of HTN who presented to outside ED Friday with right sided weakness (leg greater than arm). Patient states this weakness began Thursday night. She went to sleep thinking it would improve. It did not the next day, and she presented to the ED.  She says that she does not think her speech is slurred but a family member thinks that it may be a little slurred.    On evaluation, patient endorses continued right sided weakness (leg greater than arm). She does report that her  in her right hand is not as strong and she has been dropping things. Patient states she is not able to ambulate without assistance. She has been using the wheelchair in the hospital and she has been able to ambulate to the restroom while holding on the the walls.    Patient denies any numbness, tingling, difficulty swallowing, headache, dizziness or blurry vision.     Patient not taking any antiplatelet therapy. She states her only medication is norvasc daily.     Neurological ROS: Per HPI    Past Medical History:   Diagnosis Date    Arthritis     Hypertension        Past Surgical History:   Procedure Laterality Date    APPENDECTOMY      BREAST SURGERY      Bilateral mastectomy    HYSTERECTOMY         Current Facility-Administered Medications on File Prior to Encounter   Medication Dose Route Frequency Provider Last Rate Last Dose    [COMPLETED] aspirin tablet 325 mg  325 mg Oral ED 1 Time Xander Hanna MD   325 mg at 04/28/17 1705     Current Outpatient Prescriptions on File Prior to Encounter   Medication Sig Dispense Refill    amlodipine (NORVASC) 2.5 MG tablet Take 5 mg by mouth once daily.       ibuprofen (ADVIL,MOTRIN) 200 MG tablet Take 200 mg by mouth every 6 (six) hours as needed for Pain.         Review of patient's allergies  indicates:  No Known Allergies    Family History   Problem Relation Age of Onset    Hypertension Mother     Heart disease Father     Diabetes Maternal Grandmother      Social History     Social History    Marital status:      Spouse name: N/A    Number of children: N/A    Years of education: N/A     Social History Main Topics    Smoking status: Current Every Day Smoker     Packs/day: 0.50     Years: 15.00    Smokeless tobacco: None    Alcohol use No    Drug use: No    Sexual activity: Not Asked     Other Topics Concern    None     Social History Narrative         Vitals:    17 0840   BP: (!) 144/71   Pulse: (!) 51   Resp: 18   Temp: 96.6 °F (35.9 °C)           NIH Stroke Scale      Interval: Baseline  Time: 1:05 pm   Person Administering Scale: Erinn Khoury    Administer stroke scale items in the order listed. Record performance in each category after each subscale exam. Do not go back and change scores. Follow directions provided for each exam technique. Scores should reflect what the patient does, not what the clinician thinks the patient can do. The clinician should record answers while administering the exam and work quickly. Except where indicated, the patient should not be coached (i.e., repeated requests to patient to make a special effort).      1a  Level of consciousness: 0=alert; keenly responsive   1b. LOC questions:  0=Answers both tasks correctly   1c. LOC commands: 0=Answers both tasks correctly   2.  Best Gaze: 0=normal   3.  Visual: 0=No visual loss   4. Facial Palsy: 0=Normal symmetric movement   5a.  Motor left arm: 0=No drift, limb holds 90 (or 45) degrees for full 10 seconds   5b.  Motor right arm: 0=No drift, limb holds 90 (or 45) degrees for full 10 seconds   6a. motor left le=No drift, limb holds 90 (or 45) degrees for full 10 seconds   6b  Motor right le=Drift, limb holds 90 (or 45) degrees but drifts down before full 10 seconds: does not hit bed   7.  Limb Ataxia: 1=Present in one limb; unable to perform heel to shin with right foot   8.  Sensory: 0=Normal; no sensory loss   9. Best Language:  0=No aphasia, normal   10. Dysarthria: 1=Mild to moderate, patient slurs at least some words and at worst, can be understood with some difficulty   11. Extinction and Inattention: 0=No abnormality    Total:   2                                                  Neurological Examination    Mental Status:  Alert and oriented to self, age, month, city and situation.     Language:  No aphasia, mild dysarthria noted    Cranial Nerves:  PERRL, EOM intact bilaterally, V1-V3 with good sensation to light touch, no facial asymmetry, hearing grossly intact, palate, and tongue midline. Good shoulder shrug.     Motor:  Strength:   RUE: extension 4/5, flexion 5/5  LUE: 5/5  RLE: right hip flexor 3/5, knee flexion, knee extension, plantarflexion and dorsiflexion 4/5  LLE 5/5      DTRs:  Symmetric and 2+ through out.     Sensation:  Intact to light touch through out.    Cerebellar:  Slight dysmetria noted on right finger to nose.; unable to assess heel to shin on right foot    Gait and Stand:  Deferred    Labs  Na 140  K 3.6  Cl: 109  CO2 21  Glucose 79  BUN/Creatinine 12/0.8  Calcium 9.1  Wbc 5.21  H/H 12.9/38.4  MCV 92  Platelets 189  Lipid Panel: Cholesterol 282, Triglycerides 109, HDL 87,   Hg A1C 5.6  TSH 1.52      Imaging  -CT head: No acute abnormality     Imaging Results         MRA Neck with contrast (Final result) Result time:  04/29/17 12:38:45    Procedure changed from MRA Neck        Final result by Lex Benson III, MD (04/29/17 12:38:45)    Impression:     Normal MRA neck vessels.      Electronically signed by: LEX BENSON  Date:     04/29/17  Time:    12:38     Narrative:    Findings: Patient was administered 10 cc of gadolinium.    There is a bovine arch. Arch and great vessels are otherwise normal. CCAs ICAs and vertebral arteries are patent. No stenosis, or  dissection seen.            MRI Brain W WO Contrast (Final result) Result time:  04/29/17 12:37:09    Final result by Lex Benson III, MD (04/29/17 12:37:09)    Impression:     Small acute infarct left posterior limb internal capsule.      Electronically signed by: LEX BENSON  Date:     04/29/17  Time:    12:37     Narrative:    Findings: Patient was administered 10 cc of gadolinium.    There is a 6 millimeter infarct involving the left posterior limb of the internal capsule. There is involutional change and microvascular small vessel ischemic change. Major flow voids are present for expected. There are cataract implants. Sinuses are clear. Skull and skull base are nothing unusual. No blood products are detected. Pituitary and craniocervical junction are nothing unusual. Postcontrast images reveal no abnormal enhancement.            MRA Brain (Final result) Result time:  04/29/17 12:34:24    Final result by Lex Benson III, MD (04/29/17 12:34:24)    Impression:     Normal MRA.      Electronically signed by: LEX BENSON  Date:     04/29/17  Time:    12:34     Narrative:    Findings: Skull base vessels are normal. ACAs MCAs and PCAs are patent. No aneurysm, stenosis, or vascular malformation seen.                ASSESSMENT:     71 y.o. year old female with PMHx of HTN presented to outside ED with right extremity weakness (leg greater than arm) that began 1 day prior to arrival. Patient symptoms and NIHSS = 2 concerning for stroke of ischemic etiology. MRI brain with acute infarct left posterior limb of internal capsule.       PLAN:    - IV- tPA candidate: No, as patient's symptoms began the day prior to arrival.  - No need for permissive HTN as symptoms began 2 days prior  - Diet per speech therapy as they have recommend mechanical soft with thin liquids  - Anti-platelet therapy: ASA 81 mg daily  - Atorvastatin 40 mg for secondary stroke prevention  - Labs: HgA1C 5.6, Lipid Panel with elevated  Cholesterol at 282, TSH wnl at 1.52  - Imaging:    CT head with no acute intracranial process   MRI brain with small acute infarct in left posterior limb of internal capsule   MRA head and neck normal.   Echo pending  - Rehab efforts with PT/OT/ST  - Discussed the importance of smoking cessation with patient for secondary stroke prevention    Educated patient on risk factors for stroke including diabetes, hypertension, hyperlipidemia, tobacco smoking    Educated patient on signs/symptoms of stroke and to call 911 immediately if such occurs outside this hospitalization  ---------------------------------------------------------------------------------------------------------------------  No further recommendations at this time.       Erinn Khoury MD  Neurology       24-Sep-2024 20:21